# Patient Record
Sex: FEMALE | Race: ASIAN | NOT HISPANIC OR LATINO | Employment: UNEMPLOYED | URBAN - METROPOLITAN AREA
[De-identification: names, ages, dates, MRNs, and addresses within clinical notes are randomized per-mention and may not be internally consistent; named-entity substitution may affect disease eponyms.]

---

## 2022-08-31 ENCOUNTER — OFFICE VISIT (OUTPATIENT)
Dept: FAMILY MEDICINE | Facility: CLINIC | Age: 22
End: 2022-08-31
Payer: COMMERCIAL

## 2022-08-31 VITALS
SYSTOLIC BLOOD PRESSURE: 108 MMHG | RESPIRATION RATE: 16 BRPM | HEART RATE: 80 BPM | BODY MASS INDEX: 20.56 KG/M2 | OXYGEN SATURATION: 100 % | DIASTOLIC BLOOD PRESSURE: 72 MMHG | HEIGHT: 59 IN | TEMPERATURE: 98.8 F | WEIGHT: 102 LBS

## 2022-08-31 DIAGNOSIS — Z32.01 PREGNANCY TEST POSITIVE: Primary | ICD-10-CM

## 2022-08-31 PROCEDURE — 99203 OFFICE O/P NEW LOW 30 MIN: CPT | Mod: GC | Performed by: STUDENT IN AN ORGANIZED HEALTH CARE EDUCATION/TRAINING PROGRAM

## 2022-08-31 RX ORDER — PNV NO.95/FERROUS FUM/FOLIC AC 28MG-0.8MG
1 TABLET ORAL DAILY
Qty: 90 TABLET | Refills: 3 | Status: SHIPPED | OUTPATIENT
Start: 2022-08-31 | End: 2023-09-21

## 2022-08-31 RX ORDER — PYRIDOXINE HCL (VITAMIN B6) 25 MG
25 TABLET ORAL DAILY
Qty: 90 TABLET | Refills: 0 | Status: SHIPPED | OUTPATIENT
Start: 2022-08-31 | End: 2023-02-08

## 2022-08-31 NOTE — PROGRESS NOTES
"  Assessment & Plan     1. Pregnancy test positive  Patient will return tomorrow for 9 am visit with Dr. Grfifin.  Because she misunderstood her to do a urine pregnancy test, I reviewed this with her today.  She will come earlier tomorrow morning to do the urine pregnancy test before her appointment with Dr. Griffin.  - Prenatal Vit-Fe Fumarate-FA (PRENATAL VITAMINS) 28-0.8 MG TABS; Take 1 tablet by mouth daily  Dispense: 90 tablet; Refill: 3  - pyridOXINE (VITAMIN B6) 25 MG tablet; Take 1 tablet (25 mg) by mouth daily  Dispense: 90 tablet; Refill: 0  - US OB <14 WKS SINGLE OR FIRST GESTATION        Staffed with Dr. Gillis.      Sol Jorge MD  PGY3  [facility] VANESSA Resendez is a 21 year old accompanied by her spouse and  (phone) presenting for the following health issues:  Pregnancy Test (Home preg test positive on 7/23/22), New Patient, and Medication Reconciliation (Reviewed)      HPI      Positive pregnancy test at home.  Unfortunately, she went to the bathroom but not in the urine cup. She had nausea, vomiting.  She also feels very tired. LMP 5/24.    Review of Systems   Constitutional, HEENT, cardiovascular, pulmonary, gi and gu systems are negative, except as otherwise noted.      Objective    /72 (BP Location: Left arm, Patient Position: Sitting, Cuff Size: Adult Regular)   Pulse 80   Temp 98.8  F (37.1  C) (Oral)   Resp 16   Ht 1.495 m (4' 10.86\")   Wt 46.3 kg (102 lb)   LMP 05/24/2022 (Within Days)   SpO2 100%   BMI 20.70 kg/m    Body mass index is 20.7 kg/m .  Physical Exam   GENERAL: healthy, alert and no distress  NECK: no adenopathy, no asymmetry, masses, or scars and thyroid normal to palpation  RESP: lungs clear to auscultation - no rales, rhonchi or wheezes  CV: regular rate and rhythm, normal S1 S2, no S3 or S4, no murmur, click or rub, no peripheral edema and peripheral pulses strong  ABDOMEN: soft, nontender, no hepatosplenomegaly, no " masses and bowel sounds normal  MS: no gross musculoskeletal defects noted, no edema    No results found for this or any previous visit (from the past 24 hour(s)).    ----- Service Performed and Documented by Resident or Fellow ------            .  ..

## 2022-08-31 NOTE — PATIENT INSTRUCTIONS
Thank you for coming to [facility] for your care.  It was a pleasure to take care of you!    Here is what we discussed:  Congratulations on your pregnancy!  Start taking prenatal vitamin every day  Take B6 when you feel nauseous  Return to clinic tomorrow at 9 am for your appointment with Dr. Griffin. You will need to do a pregnancy test before that.     Please call our clinic ([phone]) or send a message via [email or web address] with any questions or concerns!    Dr. Sol Jorge

## 2022-08-31 NOTE — NURSING NOTE
Due to patient being non-English speaking/uses sign language, an  was used for this visit. Only for face-to-face interpretation by an external agency, date and length of interpretation can be found on the scanned worksheet.     name: [id number]  Agency: [employer]  Language: Nahed   Telephone number: [phone]  Type of interpretation: Telephone, spoken

## 2022-08-31 NOTE — PROGRESS NOTES
Preceptor Attestation:  I discussed the patient with the resident and evaluated the patient in person. I have verified the content of the note, which accurately reflects my assessment of the patient and the plan of care.  Supervising Physician:  Bekah Gillis MD.

## 2022-08-31 NOTE — Clinical Note
GILES this patient was seen today but misunderstood instructions on doing a urine pregnancy test.  LMP end of May.  I reviewed with her that she will need to come early to do a UPT before seeing Dr. Griffin at 9.  I would love to see her after her first OB visit for ongoing prenatal care. Thank you, AD

## 2022-09-01 ENCOUNTER — OFFICE VISIT (OUTPATIENT)
Dept: FAMILY MEDICINE | Facility: CLINIC | Age: 22
End: 2022-09-01
Payer: COMMERCIAL

## 2022-09-01 DIAGNOSIS — N89.8 VAGINAL DISCHARGE: ICD-10-CM

## 2022-09-01 DIAGNOSIS — N92.6 MISSED MENSES: Primary | ICD-10-CM

## 2022-09-01 LAB
CLUE CELLS: ABNORMAL
HCG UR QL: POSITIVE
TRICHOMONAS, WET PREP: ABNORMAL
WBC'S/HIGH POWER FIELD, WET PREP: ABNORMAL
YEAST, WET PREP: ABNORMAL

## 2022-09-01 PROCEDURE — 99213 OFFICE O/P EST LOW 20 MIN: CPT | Performed by: STUDENT IN AN ORGANIZED HEALTH CARE EDUCATION/TRAINING PROGRAM

## 2022-09-01 PROCEDURE — 76815 OB US LIMITED FETUS(S): CPT | Performed by: STUDENT IN AN ORGANIZED HEALTH CARE EDUCATION/TRAINING PROGRAM

## 2022-09-01 PROCEDURE — 87210 SMEAR WET MOUNT SALINE/INK: CPT | Performed by: STUDENT IN AN ORGANIZED HEALTH CARE EDUCATION/TRAINING PROGRAM

## 2022-09-01 PROCEDURE — 81025 URINE PREGNANCY TEST: CPT | Performed by: STUDENT IN AN ORGANIZED HEALTH CARE EDUCATION/TRAINING PROGRAM

## 2022-09-01 SDOH — ECONOMIC STABILITY: TRANSPORTATION INSECURITY
IN THE PAST 12 MONTHS, HAS LACK OF TRANSPORTATION KEPT YOU FROM MEETINGS, WORK, OR FROM GETTING THINGS NEEDED FOR DAILY LIVING?: NO

## 2022-09-01 SDOH — ECONOMIC STABILITY: TRANSPORTATION INSECURITY
IN THE PAST 12 MONTHS, HAS THE LACK OF TRANSPORTATION KEPT YOU FROM MEDICAL APPOINTMENTS OR FROM GETTING MEDICATIONS?: NO

## 2022-09-01 SDOH — ECONOMIC STABILITY: FOOD INSECURITY: WITHIN THE PAST 12 MONTHS, THE FOOD YOU BOUGHT JUST DIDN'T LAST AND YOU DIDN'T HAVE MONEY TO GET MORE.: NEVER TRUE

## 2022-09-01 SDOH — ECONOMIC STABILITY: FOOD INSECURITY: WITHIN THE PAST 12 MONTHS, YOU WORRIED THAT YOUR FOOD WOULD RUN OUT BEFORE YOU GOT MONEY TO BUY MORE.: NEVER TRUE

## 2022-09-01 ASSESSMENT — SOCIAL DETERMINANTS OF HEALTH (SDOH): HOW HARD IS IT FOR YOU TO PAY FOR THE VERY BASICS LIKE FOOD, HOUSING, MEDICAL CARE, AND HEATING?: NOT HARD AT ALL

## 2022-09-01 NOTE — NURSING NOTE
Due to patient being non-English speaking/uses sign language, an  was used for this visit. Only for face-to-face interpretation by an external agency, date and length of interpretation can be found on the scanned worksheet.      name: Leena Karimi  Agency: [employer]  Language: Victoria  .  Type of interpretation: Face-to-face, spoken

## 2022-09-01 NOTE — PROGRESS NOTES
Pregnancy Ultrasound for Dating and Initial Medical Assessment    21 year old,, at 14w2d by LMP 22, planned pregnancy.  No vaginal bleeding; has malodorous creamy white discharge.  No pain  Minimal nausea/vomiting  Good PO tolerance    OB History    Para Term  AB Living   1 0 0 0 0 0   SAB IAB Ectopic Multiple Live Births   0 0 0 0 0      # Outcome Date GA Lbr Julio C/2nd Weight Sex Delivery Anes PTL Lv   1 Current                Past Medical History:   Diagnosis Date     Known health problems: none        Past Surgical History:   Procedure Laterality Date     NO HISTORY OF SURGERY         No Known Allergies    Current Outpatient Medications   Medication     Prenatal Vit-Fe Fumarate-FA (PRENATAL VITAMINS) 28-0.8 MG TABS     pyridOXINE (VITAMIN B6) 25 MG tablet     No current facility-administered medications for this visit.       LMP 2022 (Within Days)     Labs: not yet done    Ultrasound Findings: See imaging  Transabdominal images obtained    + Intrauterine gestational sac  + Yolk sac   + Fetal pole  + Cardiac motion  + Gross movement    CRL =6.46 cm = 12w6d  CRL =6.25 cm = 12w5d  CRL =5.94 cm = 12w3d    Average CRL = 6.21cm = 12w5d      Impression: Single living IUP, redated at 12w5d by today's early dating ultrasound, with ROQUE 3/11/2023.    C/W LMP? No  Redate? Yes    Recommendations for Prenatal Care: Routine prenatal care    Aspirin prophylaxis starting at 12-16 wk: No  Any one of the following high risk factors:  1. Pregestational DM1 or DM2  2. Chronic HTN  3. Autoimmune dz (Eg SLE, APLS)   4. H/o Preeclampsia in prior pregnancy  5. Multifetal gestation  6.   Renal Disease     Consider for two or more  of the following moderate risk factors:  1. Nulliparity or > 10 years since last birth  2. Obesity (BMI > 30)  3. H/o preeclampsia in mother or sister  4. Age ? 35 years  5. Socio-demographic characteristics (AA race)  6. Personal Hx of factors (prior SGA, prior adverse outcome:  stillborn)      Perinatology Consult: No    Dipti was seen today for prenatal care.    Diagnoses and all orders for this visit:    Missed menses  -     HCG qualitative urine; Future  -     HCG qualitative urine  -     US OB Limited >14 Weeks wo Fetal Measurement    Vaginal discharge  -     Wet preparation            Follow Up:  Future Appointments   Date Time Provider Department Center   9/1/2022  9:00 AM Niharika Griffin MD [facility]       Niharika Griffin MD

## 2022-09-01 NOTE — NURSING NOTE
Average Risk Category  No significant risk factors: Yes    At Risk Category (up to 3)  Teen pregnancy: No  Poor social situation: No  Domestic abuse: No  Financial difficulties: No  Smoker: No  H/O  deliver: No  H/O drug abuse: No  Non-English speaking: Yes  Advanced maternal age: No  GDM risks: No  Previous C/S: No  H/O PIH: No  H/O STIs: No  H/O mental health concerns: No  Onset care > 20 weeks: No  Other: First pregnancy    High Risk Category (4 or more At Risk or)  Diabetes/GDM: No  Multiple gestation: No  Chronic hypertension: No  Significant hx of asthma: No  Fetal demise > 20 weeks: No  Positive tox screen: No  Current mental health treatment: No      Risk: At Risk   Date determined: 22

## 2022-09-07 ENCOUNTER — ALLIED HEALTH/NURSE VISIT (OUTPATIENT)
Dept: FAMILY MEDICINE | Facility: CLINIC | Age: 22
End: 2022-09-07
Payer: COMMERCIAL

## 2022-09-07 ENCOUNTER — OFFICE VISIT (OUTPATIENT)
Dept: FAMILY MEDICINE | Facility: CLINIC | Age: 22
End: 2022-09-07

## 2022-09-07 VITALS
WEIGHT: 102 LBS | SYSTOLIC BLOOD PRESSURE: 110 MMHG | BODY MASS INDEX: 20.56 KG/M2 | OXYGEN SATURATION: 100 % | RESPIRATION RATE: 16 BRPM | HEART RATE: 69 BPM | TEMPERATURE: 99 F | DIASTOLIC BLOOD PRESSURE: 73 MMHG | HEIGHT: 59 IN

## 2022-09-07 DIAGNOSIS — Z34.01 ENCOUNTER FOR SUPERVISION OF NORMAL FIRST PREGNANCY IN FIRST TRIMESTER: Primary | ICD-10-CM

## 2022-09-07 DIAGNOSIS — L70.0 ACNE VULGARIS: ICD-10-CM

## 2022-09-07 DIAGNOSIS — Z01.84 LACK OF IMMUNITY TO HEPATITIS B VIRUS DEMONSTRATED BY SEROLOGIC TEST: ICD-10-CM

## 2022-09-07 DIAGNOSIS — Z34.00 PRIMIGRAVIDA, ANTEPARTUM: ICD-10-CM

## 2022-09-07 DIAGNOSIS — Z60.3 LANGUAGE BARRIER, CULTURAL DIFFERENCES: ICD-10-CM

## 2022-09-07 DIAGNOSIS — B00.1 RECURRENT COLD SORES: ICD-10-CM

## 2022-09-07 DIAGNOSIS — O09.90 SUPERVISION OF HIGH RISK PREGNANCY, ANTEPARTUM: ICD-10-CM

## 2022-09-07 LAB
ABO/RH(D): NORMAL
ANTIBODY SCREEN: NEGATIVE
ERYTHROCYTE [DISTWIDTH] IN BLOOD BY AUTOMATED COUNT: 13.3 % (ref 10–15)
HBA1C MFR BLD: 5.1 % (ref 0–5.6)
HBV SURFACE AB SERPL IA-ACNC: 0 M[IU]/ML
HBV SURFACE AB SERPL IA-ACNC: NONREACTIVE M[IU]/ML
HBV SURFACE AG SERPL QL IA: NONREACTIVE
HCT VFR BLD AUTO: 34.5 % (ref 35–47)
HGB BLD-MCNC: 12 G/DL (ref 11.7–15.7)
HIV 1+2 AB+HIV1 P24 AG SERPL QL IA: NONREACTIVE
MCH RBC QN AUTO: 30.3 PG (ref 26.5–33)
MCHC RBC AUTO-ENTMCNC: 34.8 G/DL (ref 31.5–36.5)
MCV RBC AUTO: 87 FL (ref 78–100)
PLATELET # BLD AUTO: 205 10E3/UL (ref 150–450)
RBC # BLD AUTO: 3.96 10E6/UL (ref 3.8–5.2)
RUBV IGG SERPL QL IA: 8.97 INDEX
RUBV IGG SERPL QL IA: POSITIVE
SPECIMEN EXPIRATION DATE: NORMAL
SPECIMEN EXPIRATION DATE: NORMAL
WBC # BLD AUTO: 10.2 10E3/UL (ref 4–11)

## 2022-09-07 PROCEDURE — 86762 RUBELLA ANTIBODY: CPT | Performed by: STUDENT IN AN ORGANIZED HEALTH CARE EDUCATION/TRAINING PROGRAM

## 2022-09-07 PROCEDURE — 86706 HEP B SURFACE ANTIBODY: CPT | Performed by: STUDENT IN AN ORGANIZED HEALTH CARE EDUCATION/TRAINING PROGRAM

## 2022-09-07 PROCEDURE — 86900 BLOOD TYPING SEROLOGIC ABO: CPT | Performed by: STUDENT IN AN ORGANIZED HEALTH CARE EDUCATION/TRAINING PROGRAM

## 2022-09-07 PROCEDURE — 87340 HEPATITIS B SURFACE AG IA: CPT | Performed by: STUDENT IN AN ORGANIZED HEALTH CARE EDUCATION/TRAINING PROGRAM

## 2022-09-07 PROCEDURE — 83036 HEMOGLOBIN GLYCOSYLATED A1C: CPT | Performed by: STUDENT IN AN ORGANIZED HEALTH CARE EDUCATION/TRAINING PROGRAM

## 2022-09-07 PROCEDURE — 99207 PR FIRST OB VISIT: CPT | Mod: GC | Performed by: STUDENT IN AN ORGANIZED HEALTH CARE EDUCATION/TRAINING PROGRAM

## 2022-09-07 PROCEDURE — 86780 TREPONEMA PALLIDUM: CPT | Performed by: STUDENT IN AN ORGANIZED HEALTH CARE EDUCATION/TRAINING PROGRAM

## 2022-09-07 PROCEDURE — 86901 BLOOD TYPING SEROLOGIC RH(D): CPT | Performed by: STUDENT IN AN ORGANIZED HEALTH CARE EDUCATION/TRAINING PROGRAM

## 2022-09-07 PROCEDURE — 87086 URINE CULTURE/COLONY COUNT: CPT | Performed by: STUDENT IN AN ORGANIZED HEALTH CARE EDUCATION/TRAINING PROGRAM

## 2022-09-07 PROCEDURE — 36415 COLL VENOUS BLD VENIPUNCTURE: CPT | Performed by: STUDENT IN AN ORGANIZED HEALTH CARE EDUCATION/TRAINING PROGRAM

## 2022-09-07 PROCEDURE — 85027 COMPLETE CBC AUTOMATED: CPT | Performed by: STUDENT IN AN ORGANIZED HEALTH CARE EDUCATION/TRAINING PROGRAM

## 2022-09-07 PROCEDURE — 87389 HIV-1 AG W/HIV-1&-2 AB AG IA: CPT | Performed by: STUDENT IN AN ORGANIZED HEALTH CARE EDUCATION/TRAINING PROGRAM

## 2022-09-07 PROCEDURE — 86787 VARICELLA-ZOSTER ANTIBODY: CPT | Performed by: STUDENT IN AN ORGANIZED HEALTH CARE EDUCATION/TRAINING PROGRAM

## 2022-09-07 PROCEDURE — 99207 PR NO BILLABLE SERVICE THIS VISIT: CPT

## 2022-09-07 PROCEDURE — 86850 RBC ANTIBODY SCREEN: CPT | Performed by: STUDENT IN AN ORGANIZED HEALTH CARE EDUCATION/TRAINING PROGRAM

## 2022-09-07 RX ORDER — BENZOYL PEROXIDE 10 G/100G
SUSPENSION TOPICAL AT BEDTIME
Qty: 227 G | Refills: 3 | Status: SHIPPED | OUTPATIENT
Start: 2022-09-07 | End: 2023-02-08

## 2022-09-07 RX ORDER — VALACYCLOVIR HYDROCHLORIDE 1 G/1
2000 TABLET, FILM COATED ORAL 2 TIMES DAILY
Qty: 4 TABLET | Refills: 0 | Status: ON HOLD | OUTPATIENT
Start: 2022-09-07 | End: 2023-03-10

## 2022-09-07 SDOH — SOCIAL STABILITY - SOCIAL INSECURITY: ACCULTURATION DIFFICULTY: Z60.3

## 2022-09-07 NOTE — NURSING NOTE
[phone]  Due to patient being non-English speaking/uses sign language, an  was used for this visit. Only for face-to-face interpretation by an external agency, date and length of interpretation can be found on the scanned worksheet.     name: Yumiko Lamar  Agency: [employer]  Language: Nahed   Telephone number: [phone]  Type of interpretation: Face-to-face, spoken

## 2022-09-07 NOTE — PROGRESS NOTES
Past Medical History     Do you have a history of any of the following medical conditions?    Condition No/Yes/Details   Hypertension No    Heart disease, mitral valve prolapse, rheumatic fever No    Asthma or another chronic lung disease No    An autoimmune disorder No    Kidney disease No    Frequent urinary tract infections No    Epilepsy, seizures, or spells No    Migraine headaches No    Stroke, loss of sensation/function, seizures, or other neuro problem No    Diabetes No    Thyroid problems or have you taken thyroid medication No    Hepatitis, liver disease, jaundice No    Blood clots, phlebitis, pulmonary embolism or varicose veins No    Excessive bleeding after surgery or dental work No    Do you have more bleeding than other women after a cut or a scratch? No    Anemia No    Blood transfusions No         Would you refuse a blood transfusion?       No   If yes, then ask next question. If no, skip next question.   Would you rather die than receive a blood transfusion? No    Breast problems No    Have you ever ? No plans to bottle feed   Abnormalities of the uterus No    Abnormal pap smear No    Have you ever been treated for depression? No    Are you having problems with crying spells or loss of self-esteem? No    Have you ever required psychiatric care? No    Have you been physically, sexually, or emotionally hurt by someone? No    Have you been in a major accident or suffered serious trauma? No    Have you ever had any gynecological surgical procedures such as cervical conization, LEEP, laser treatment, cryosurgery of the cervix, or a dilatation and curettage? No    Have you had any other surgical procedures? No         Have you ever had any complications from anesthesia? No    Have you ever been hospitalized for a nonsurgical reason? No             Substance use and exposure     Does anyone in your home smoke? No    Do you use tobacco products or betel nut? No    Do you drink beer, wine,  or hard liquor? No    Do you use any of the following: marijuana, speed, cocaine, heroin, hallucinogens, or other drugs? No               Symptoms since Last Menstrual Period     Do you currently have any of the following symptoms: No/Yes/Details        Abdominal pain No         Blood in the stools or urine No         Chest pain No         Shortness of breath No         coughing or vomiting up blood No         Your heart racing or skipping beats No         Nausea or vomiting  YES getting better        Pain on urination No         Vaginal discharge or bleeding  YES white vaginal discharge               Genetic Screening          Is the patient 35 years or older? No      Do you have a history of any of the following No/Yes/Details        A metabolic disorder (e.g. Insulin-dependent DM, PKU) No         Recurrent pregnancy loss or still birth No      Do you, the baby's father, or anyone in your families have          Thalassemia AND MCV <80 No         Hemophilia No         Neural tube defect No }        Congenital heart defect No         Sickle cell disease or trait No         Muscular dystrophy No         Cystic fibrosis No         Mental retardation or autism No         Down's syndrome No         Ric-Sach's disease No         Юлия's chorea No         Any other inherited genetic or chromosomal disorder No         A child with birth defects not listed above No                Infection History     Ever treated for tuberculosis or had a positive skin test No    Ever had genital herpes (or has your partner) No    Had a rash or viral illness since LMP No    Ever had a sexually transmitted infection No    Ever had chicken pox or the vaccine No    Have you had a sexual partner who is HIV positive No    Ever had any other serious infectious disease No                Risk Assessment     Average Risk Category  No significant risk factors: Yes    At Risk Category (up to 3)  Teen pregnancy: No  Poor social situation:  No  Domestic abuse: No  Financial difficulties: No  Smoker: No  H/O  deliver: No  H/O drug abuse: No  Non-English speaking: Yes  Advanced maternal age: No  GDM risks: No  Previous C/S: No  H/O PIH: No  H/O STIs: No  H/O mental health concerns: No  Onset care > 20 weeks: No      High Risk Category (4 or more At Risk or)  Diabetes/GDM: No  Multiple gestation: No  Chronic hypertension: No  Significant hx of asthma: No  Fetal demise > 20 weeks: No  Positive tox screen: No  Current mental health treatment: No      Risk: At Risk   Date determined: 22

## 2022-09-07 NOTE — PATIENT INSTRUCTIONS
Thank you for coming to [facility] for your care.  It was a pleasure to take care of you!    Here is what we discussed:  Congratulations on your pregnancy!  Use the face was in the evening  Use sunscreen  Take valcyclovir 1000 mg 2 times daily for 1 day      Please call our clinic ([phone]) or send a message via [employer] with any questions or concerns!    Dr. Sol Jorge

## 2022-09-07 NOTE — PROGRESS NOTES
"Preceptor Attestation:  Vitals:    09/07/22 0810   BP: 110/73   BP Location: Right arm   Patient Position: Sitting   Cuff Size: Adult Regular   Pulse: 69   Resp: 16   Temp: 99  F (37.2  C)   TempSrc: Oral   SpO2: 100%   Weight: 46.3 kg (102 lb)   Height: 1.495 m (4' 10.86\")          I discussed the patient with the resident and evaluated the patient in person. I have verified the content of the note, which accurately reflects my assessment of the patient and the plan of care.   Supervising Physician:  Niharika Griffin MD    "

## 2022-09-08 LAB — T PALLIDUM AB SER QL: NONREACTIVE

## 2022-09-08 NOTE — PROGRESS NOTES
First Obstetric Visit       AFSHIN Kirkpatrick is a 21 year old woman who presents for an initial prenatal visit at 14w2d pregnant with ROQUE of  Mar 11, 2023 by by Patient's last menstrual period was 2022 (within days)..  She has not had bleeding since her LMP. She has had mild nausea. Weight loss has not occurred.  This was a planned pregnancy.     OTHER CONCERNS: Intermittent rectal pain, acne     Labor Risk Assessment   Is the patient's age <18 or >40?    No  Patint's BMI is Body mass index is 20.7 kg/m . Does patient have a BMI < 18.5?    No  If previous pregnancy, was delivery within previous 6 months?    No  Have you ever delivered a baby prior to 37 weeks gestation?    No  Did conception for this pregnancy occur via In Vitro Fertilization?    No  Are you carrying twins?    No    Summary:  Average risk pregnancy  Past Medical History  Past Medical History:   Diagnosis Date     Known health problems: none      Do you have a history of any of the following medical conditions?    Condition Yes/No   Hypertension No   Heart disease, mitral valve prolapse, rheumatic fever No   Asthma or another chronic lung disease No   An autoimmune disorder No   Kidney disease No   Frequent urinary tract infections No   Migraine headaches No   Stroke, loss of sensation/function, seizures, or other neuro problem No   Diabetes No   Thyroid problems or have you taken thyroid medication No   Hepatitis, liver disease, jaundice No   Blood clots, phlebitis, pulmonary embolism or varicose veins No   Excessive bleeding after surgery or dental work No   Heavy menstrual periods requiring treatment No   Anemia No   Blood transfusions No        Would you refuse a blood transfusion? No   Breast problems No   Abnormalities of the uterus No   Abnormal pap smear No   Have you been treated for an emotional disturbance? No   Have you been physically, sexually, or emotionally hurt by someone? No   Have you been in a major accident or  suffered serious trauma? No   Have you had surgical procedures? No        Have you ever had any complications from anesthesia? No   Have you ever been hospitalized for a nonsurgical reason? No     Prenatal Genetic Screening -- See today's RN note.    Do you have a history of any of the following Yes/No        A metabolic disorder (e.g. Insulin-dependent DM, PKU) No        Recurrent pregnancy loss No     Do you, the baby's father, or anyone in your families have Yes/No        Thalassemia AND MCV <80 No        Hemophilia No        Neural tube defect No        Congenital heart defect No        Sickle cell disease or trait No        Muscular dystrophy No        Cystic fibrosis No        Mental retardation or autism No        Down's syndrome No        Ric-Sach's disease No        Юлия's chorea No        Any other inherited genetic or chromosomal disorder No        A child with birth defects not listed above No     Infection History    At high risk for coming in contact with HIV No   Ever treated for tuberculosis No   Ever received the BCG vaccine for tuberculosis No   Ever had genital herpes (or has your partner) No   Had a rash or viral illness since LMP No   Ever had a sexually transmitted infection No   Ever had chicken pox or the vaccine Yes   Ever had any other serious infectious disease No   Up to date with immunizations Yes       Habits    No lumps and no nipple discharge    Current medications are:  Current Outpatient Medications   Medication Sig Dispense Refill     benzoyl peroxide (PANOXYL) 10 % external liquid Apply topically At Bedtime 227 g 3     Prenatal Vit-Fe Fumarate-FA (PRENATAL VITAMINS) 28-0.8 MG TABS Take 1 tablet by mouth daily 90 tablet 3     valACYclovir (VALTREX) 1000 mg tablet Take 2 tablets (2,000 mg) by mouth 2 times daily for 1 day 4 tablet 0     pyridOXINE (VITAMIN B6) 25 MG tablet Take 1 tablet (25 mg) by mouth daily (Patient not taking: Reported on 9/7/2022) 90 tablet 0       REVIEW  "OF SYSTEMS  ENT: NEGATIVE for ear, mouth and throat problems  CV: NEGATIVE for chest pain, palpitations or peripheral edema  GI: NEGATIVE for nausea, abdominal pain, heartburn, or change in bowel habits  : NEGATIVE for unusual urinary or vaginal symptoms. Periods are regular.  C: NEGATIVE for fever, chills, change in weight  I: NEGATIVE for worrisome rashes, moles or lesions  E: NEGATIVE for vision changes or irritation  R: NEGATIVE for significant cough or SOB  B: NEGATIVE for masses, tenderness or discharge  M: NEGATIVE for significant arthralgias or myalgia  N: NEGATIVE for weakness, dizziness or paresthesias  P: NEGATIVE for changes in mood or affect  ====================================================    PHYSICAL EXAM:  /73 (BP Location: Right arm, Patient Position: Sitting, Cuff Size: Adult Regular)   Pulse 69   Temp 99  F (37.2  C) (Oral)   Resp 16   Ht 1.495 m (4' 10.86\")   Wt 46.3 kg (102 lb)   LMP 05/24/2022 (Within Days)   SpO2 100%   BMI 20.70 kg/m         GENERAL:  Pleasant pregnant female, alert, well groomed.  SKIN:  Warm and dry, without lesions or rashes  HEAD: Symmetrical features.  EYES:  PERRL, EOMI.  MOUTH:  Buccal mucosa pink, moist without lesions.    NECK:  Thyroid without enlargement and nodules.  Lymph nodes not palpable.  LUNGS:  Clear to auscultation.  BREAST:  Deferred - Next visit   HEART:  RRR without murmur.  ABDOMEN: Soft without masses, tenderness or organomegaly.  No CVA tenderness..   MUSCULOSKELETAL:  Full range of motion  SKIN:  Comedones on face and cheeks  EXTREMITIES:  No edema. No significant varicosities.   :  Deferred - next visit    =========================================    ASSESSMENT/PLAN  1. Encounter for supervision of normal first pregnancy in first trimester  - Varicella Zoster Virus Antibody IgG; Future  - Hepatitis B Surface Antibody; Future  - Hemoglobin A1c; Future  - ABO/Rh Type-HML; Future  - Antibody Screen; Future  - CBC with Plt; " Future  - Culture Urine; Future  - Hepatitis B Surface Ag; Future  - HIV Ag/Ab Screen Cascade; Future  - Rubella Antibody IgG; Future  - Syphilis Screen Cascade; Future  - Varicella Zoster Virus Antibody IgG  - Hepatitis B Surface Antibody  - Hemoglobin A1c  - ABO/Rh Type-HML  - Antibody Screen  - CBC with Plt  - Culture Urine  - Hepatitis B Surface Ag  - HIV Ag/Ab Screen Cascade  - Rubella Antibody IgG  - Syphilis Screen Cascade    2. Recurrent cold sores  - valACYclovir (VALTREX) 1000 mg tablet; Take 2 tablets (2,000 mg) by mouth 2 times daily for 1 day  Dispense: 4 tablet; Refill: 0    3. Acne vulgaris  - benzoyl peroxide (PANOXYL) 10 % external liquid; Apply topically At Bedtime  Dispense: 227 g; Refill: 3    Average risk pregnancy  Recommended weight gain: 25-35 lbs.  Instructed on best evidence for: weight gain for her BMI for pregnancy; healthy diet and foods to avoid; exercise and activity during pregnancy;  avoiding exposure to toxoplasmosis; and maintenance of a generally healthy lifestyle.   Discussed the harms, benefits, side effects and alternative therapies for current prescribed and OTC medications.    Options for treatment and follow-up care were reviewed with the patient and/or guardian. Dipti Kirkpatrick and/or guardian engaged in the decision making process and verbalized understanding of the options discussed and agreed with the final plan.    Staffed with Dr. Griffin.    Sol Jorge MD

## 2022-09-09 PROBLEM — Z60.3 LANGUAGE BARRIER, CULTURAL DIFFERENCES: Status: ACTIVE | Noted: 2022-09-07

## 2022-09-09 PROBLEM — Z01.84 LACK OF IMMUNITY TO HEPATITIS B VIRUS DEMONSTRATED BY SEROLOGIC TEST: Status: ACTIVE | Noted: 2022-09-07

## 2022-09-09 PROBLEM — Z34.00 FIRST PREGNANCY: Status: ACTIVE | Noted: 2022-09-07

## 2022-09-09 PROBLEM — O09.90 SUPERVISION OF HIGH RISK PREGNANCY, ANTEPARTUM: Status: ACTIVE | Noted: 2022-09-09

## 2022-09-09 LAB
BACTERIA UR CULT: NORMAL
VZV IGG SER QL IA: 680.8 INDEX
VZV IGG SER QL IA: POSITIVE

## 2022-09-09 NOTE — NURSING NOTE
Family Medicine OB Education    I provided the following OB education to Dipti Kirkpatrick.    Discussed that Dr Jorge should be the doctor that she sees for her prenatal visits and that provider will try hard to be the one to deliver her baby.  Discussed that if primary provider was unavailable that one of our other physicians would deliver the baby and that this could be a male or female provider.  Briefly discussed residency program and that multiple doctors would be present at time of delivery.  Sheet given and discussed fetal growth and development.  Sheet given and discussed warning signs with reasons to call clinic or L&D with questions or concerns (phone numbers given).  Sheet given and discussed Tdap to be given after 27 weeks gestation and the importance of family members get immunized before delivery.  Proof of pregnancy completed and given to pt.  Gave pt preregistration form for hospital to complete.  See questionnaire and pregnancy risk assessment for further information in provider encounter.     Legal Screener completed and there were no concerns for government benefits, housing, or immigration.      Name of provider who requested the OB education: Dr Jorge  Name of provider on site (faculty or community preceptor) at the time of performing the OB education: Dr Elias Singh, RN, BSN

## 2022-09-21 ENCOUNTER — OFFICE VISIT (OUTPATIENT)
Dept: FAMILY MEDICINE | Facility: CLINIC | Age: 22
End: 2022-09-21
Payer: COMMERCIAL

## 2022-09-21 VITALS
OXYGEN SATURATION: 100 % | RESPIRATION RATE: 16 BRPM | DIASTOLIC BLOOD PRESSURE: 78 MMHG | SYSTOLIC BLOOD PRESSURE: 110 MMHG | HEART RATE: 83 BPM | TEMPERATURE: 98.7 F

## 2022-09-21 DIAGNOSIS — L50.9 URTICARIA: Primary | ICD-10-CM

## 2022-09-21 PROCEDURE — 99213 OFFICE O/P EST LOW 20 MIN: CPT | Mod: GC

## 2022-09-21 RX ORDER — TETRAHYDROZOLINE HCL 0.05 %
1 DROPS OPHTHALMIC (EYE) 3 TIMES DAILY
Qty: 30 ML | Refills: 0 | Status: SHIPPED | OUTPATIENT
Start: 2022-09-21 | End: 2023-02-08

## 2022-09-21 RX ORDER — DIAPER,BRIEF,INFANT-TODD,DISP
EACH MISCELLANEOUS 2 TIMES DAILY
Qty: 30 G | Refills: 1 | Status: SHIPPED | OUTPATIENT
Start: 2022-09-21 | End: 2023-02-08

## 2022-09-21 NOTE — PROGRESS NOTES
"  Assessment & Plan     Urticaria  Patient had an episode of rash and itching of the face following eating a particular brand of noodles she previously had no reaction to.  Unclear the exact irritant in the noodles, but advised patient to avoid this particular brand in the meanwhile.  May be exacerbated by pregnancy.  Patient's main symptoms consist of itching, rash, and burning sensation of the eyes.  Patient has a history of \"chronic cold sores\" that may be impacting the rash around the mouth.  -Treat current urticaria, then follow-up about chronic cold sores  - tetrahydrozoline (VISINE) 0.05 % ophthalmic solution; Place 1 drop into both eyes 3 times daily  - hydrocortisone (CORTAID) 1 % external ointment; Apply topically 2 times daily Apply to face twice a day. Use moisturizer to avoid skin drying. Do not let get in eyes.  -She may obtain these over-the-counter if not covered by insurance  -Follow-up for discussion if border of the mouth rash does not continue to improve  Diagnosis or treatment significantly limited by social determinants of health - non-English speaking  Prescription drug management  35 minutes spent on the date of the encounter doing chart review, history and exam, documentation and further activities per the note         Return if symptoms worsen or fail to improve, for or for follow up of cold sores.    Niharika Arroyo MD PGY-2  [facility] VANESSA Resendez is a 21 year old accompanied by her , presenting for the following health issues:  Allergic Reaction (Pt thinks she had an allergic reaction to some noodles she ate.  She denies any internal reactions.  NO anaphylaxis.  She states the reaction started 3 days ago.  Pt states this is the second time she has had this particular brand of noodles but was unaware that they caused this reaction the first time.  She states the first time was in the same week. She had the noodles the first time on Saturday but " "the reaction started on Sunday.  Then she had them again on Monday and the rash started right away.)      HPI      Patient comes in for evaluation of possible allergic reaction secondary to noodle consumption.  The reaction was delayed from when she ate the noodles.  Noodles were consumed on Saturday, rash and pruritus of the face began on Sunday.  The rash began today down, and she had the same brand again on Monday, but the rash started right away this time.  He has had this brand of needles before on multiple occasions without incident.  She reports her main symptoms are the rash which is covering her whole face, and severe itching around the eyes and mouth.  She has been treating this at home with Benadryl, which has helped the itch a little bit, but does eventually wear off.  She reports feeling hot but had no temperature device to record herself at home.    She made an important note that the rash around her mouth is a little bit different than the rest of her body as she has \"chronic cold sores\" that she has been treating with Abreva.  These been bothering her for a long time and she is not sure if this is complicating the rash and its burning sensation around the sides of the mouth.    She denies any symptoms of difficulty breathing, throat swelling, tongue swelling, dysphagia, nausea, vomiting, abdominal cramping, lightheadedness, or dizziness.    He is currently 15 weeks 4 days pregnant.  She has no prior history of food or environmental allergies.        Review of Systems   Constitutional, HEENT, cardiovascular, pulmonary, gi and gu systems are negative, except as otherwise noted.    Reports eye burning sensation, facial rash, pruritis, and perioral burning sensation.     Patient denies dysphagia, difficulty breathing, oral swelling, tongue swelling, nausea, vomiting, abdominal cramping, or dizziness.      Objective    /78 (BP Location: Left arm, Patient Position: Sitting, Cuff Size: Adult Regular)  "  Pulse 83   Temp 98.7  F (37.1  C) (Oral)   Resp 16   LMP 05/24/2022 (Within Days)   SpO2 100%   There is no height or weight on file to calculate BMI.  Physical Exam   GENERAL: healthy, alert and no distress, maculopapular facial rash   EYES: Eyes grossly normal to inspection, PERRL and conjunctivae and sclerae normal  HENT: ear canals and pinna free of rash, nares clear with no internal swelling, oral mucosa moist and pink, no swelling or erythema of oropharynx   NECK: no rash noted on neck, posterior or anterior  RESP: lungs clear to auscultation - no rales, rhonchi or wheezes, good air movement throughout  CV: regular rate and rhythm, normal S1 S2, no S3 or S4, no murmur, click or rub, no peripheral edema and peripheral pulses strong  ABDOMEN: soft, nontender, no hepatosplenomegaly, enlarged uterus consistent with 15w pregnancy,  and bowel sounds normal  MS: no gross musculoskeletal defects noted, no edema  SKIN: Patient has pink maculopapular rash, rash is diffuse but slightly more congregated below the eyes, around the mouth with worsening crusting all along the border of the lips, rash spares the nasolabial folds    None ordered this visit    ----- Service Performed and Documented by Resident or Fellow ------

## 2022-09-21 NOTE — PATIENT INSTRUCTIONS
The two medications prescribed are hydrocortisone ointment and Visine eye drops. Both are available over the counter in weaker strength if insurance does not cover it.

## 2022-10-05 ENCOUNTER — OFFICE VISIT (OUTPATIENT)
Dept: FAMILY MEDICINE | Facility: CLINIC | Age: 22
End: 2022-10-05
Payer: COMMERCIAL

## 2022-10-05 VITALS
BODY MASS INDEX: 21.59 KG/M2 | TEMPERATURE: 98.7 F | WEIGHT: 106.4 LBS | OXYGEN SATURATION: 97 % | DIASTOLIC BLOOD PRESSURE: 67 MMHG | HEART RATE: 80 BPM | SYSTOLIC BLOOD PRESSURE: 97 MMHG | RESPIRATION RATE: 16 BRPM

## 2022-10-05 DIAGNOSIS — Z34.01 ENCOUNTER FOR SUPERVISION OF NORMAL FIRST PREGNANCY IN FIRST TRIMESTER: Primary | ICD-10-CM

## 2022-10-05 DIAGNOSIS — L70.0 ACNE VULGARIS: ICD-10-CM

## 2022-10-05 DIAGNOSIS — O09.90 SUPERVISION OF HIGH RISK PREGNANCY, ANTEPARTUM: Primary | ICD-10-CM

## 2022-10-05 LAB
CLUE CELLS: ABNORMAL
TRICHOMONAS, WET PREP: ABNORMAL
WBC'S/HIGH POWER FIELD, WET PREP: ABNORMAL
YEAST, WET PREP: ABNORMAL

## 2022-10-05 PROCEDURE — 90746 HEPB VACCINE 3 DOSE ADULT IM: CPT | Performed by: STUDENT IN AN ORGANIZED HEALTH CARE EDUCATION/TRAINING PROGRAM

## 2022-10-05 PROCEDURE — 90686 IIV4 VACC NO PRSV 0.5 ML IM: CPT | Performed by: STUDENT IN AN ORGANIZED HEALTH CARE EDUCATION/TRAINING PROGRAM

## 2022-10-05 PROCEDURE — 90472 IMMUNIZATION ADMIN EACH ADD: CPT | Performed by: STUDENT IN AN ORGANIZED HEALTH CARE EDUCATION/TRAINING PROGRAM

## 2022-10-05 PROCEDURE — 90471 IMMUNIZATION ADMIN: CPT | Performed by: STUDENT IN AN ORGANIZED HEALTH CARE EDUCATION/TRAINING PROGRAM

## 2022-10-05 PROCEDURE — 99207 PR PRENATAL VISIT: CPT | Mod: 25 | Performed by: STUDENT IN AN ORGANIZED HEALTH CARE EDUCATION/TRAINING PROGRAM

## 2022-10-05 PROCEDURE — 87210 SMEAR WET MOUNT SALINE/INK: CPT | Performed by: STUDENT IN AN ORGANIZED HEALTH CARE EDUCATION/TRAINING PROGRAM
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
## 2022-10-05 NOTE — NURSING NOTE
Due to patient being non-English speaking/uses sign language, an  was used for this visit. Only for face-to-face interpretation by an external agency, date and length of interpretation can be found on the scanned worksheet.     name:  Natasha Kirkpatrick  Agency: [employer]  Language: Nahed   Telephone number: [phone]  Type of interpretation: Face-to-face, spoken

## 2022-10-05 NOTE — PATIENT INSTRUCTIONS
Thank you for coming to [facility] for your care.  It was a pleasure to take care of you!    Here is what we discussed:  Diphenhydramine HCl 25 mg at bedtime (Benadryl)  Aquaphor, Vanicream, Cerave are good options for skin and body  Apply hydrocortisone after moisturizer  Get an ultrasound after about 20 weeks of pregnancy (3 weeks from now)     Please call our clinic ([phone]) or send a message via [employer] with any questions or concerns!    Dr. Sol Jorge

## 2022-10-10 NOTE — PROGRESS NOTES
Preceptor Attestation:    I discussed the patient with the resident and evaluated the patient in person. I have verified the content of the note, which accurately reflects my assessment of the patient and the plan of care.   Supervising Physician:  Jonn Judge MD.

## 2022-10-24 ENCOUNTER — ANCILLARY PROCEDURE (OUTPATIENT)
Dept: ULTRASOUND IMAGING | Facility: CLINIC | Age: 22
End: 2022-10-24
Attending: FAMILY MEDICINE
Payer: COMMERCIAL

## 2022-10-24 DIAGNOSIS — O09.90 SUPERVISION OF HIGH RISK PREGNANCY, ANTEPARTUM: ICD-10-CM

## 2022-10-24 PROCEDURE — 76805 OB US >/= 14 WKS SNGL FETUS: CPT | Mod: TC | Performed by: RADIOLOGY

## 2022-10-27 ENCOUNTER — OFFICE VISIT (OUTPATIENT)
Dept: FAMILY MEDICINE | Facility: CLINIC | Age: 22
End: 2022-10-27
Payer: COMMERCIAL

## 2022-10-27 VITALS
DIASTOLIC BLOOD PRESSURE: 68 MMHG | SYSTOLIC BLOOD PRESSURE: 103 MMHG | OXYGEN SATURATION: 99 % | WEIGHT: 111.6 LBS | TEMPERATURE: 98.5 F | BODY MASS INDEX: 22.65 KG/M2 | RESPIRATION RATE: 16 BRPM | HEART RATE: 81 BPM

## 2022-10-27 DIAGNOSIS — Z01.84 LACK OF IMMUNITY TO HEPATITIS B VIRUS DEMONSTRATED BY SEROLOGIC TEST: ICD-10-CM

## 2022-10-27 DIAGNOSIS — L01.00 IMPETIGO: Primary | ICD-10-CM

## 2022-10-27 DIAGNOSIS — Z34.01 ENCOUNTER FOR SUPERVISION OF NORMAL FIRST PREGNANCY IN FIRST TRIMESTER: ICD-10-CM

## 2022-10-27 PROCEDURE — 99207 PR PRENATAL VISIT: CPT | Mod: GC | Performed by: STUDENT IN AN ORGANIZED HEALTH CARE EDUCATION/TRAINING PROGRAM

## 2022-10-27 RX ORDER — MUPIROCIN 20 MG/G
OINTMENT TOPICAL 3 TIMES DAILY
Qty: 30 G | Refills: 1 | Status: SHIPPED | OUTPATIENT
Start: 2022-10-27 | End: 2023-02-08

## 2022-10-27 NOTE — NURSING NOTE
Due to patient being non-English speaking/uses sign language, an  was used for this visit. Only for face-to-face interpretation by an external agency, date and length of interpretation can be found on the scanned worksheet.     name: Julianne Kirkpatrick  Agency: [employer]  Language: Nahed   Telephone number: [phone]  Type of interpretation: Face-to-face, spoken

## 2022-10-27 NOTE — PROGRESS NOTES
Preceptor Attestation:   Patient seen, evaluated and discussed with the resident. I have verified the content of the note, which accurately reflects my assessment of the patient and the plan of care.   Supervising Physician:  Gio Reid MD
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
## 2022-10-27 NOTE — PATIENT INSTRUCTIONS
Thank you for coming to [facility] for your care.  It was a pleasure to take care of you!    Here is what we discussed:  For the rectal area pain, apply Witch Hazel to cotton pad at least 2 times per day after a bowel movement and as needed   For your face, it is OK to use the same cream as on your body  Continue taking your prenatal vitamin  For the nipple rash , apply mupirocin ointment 2 times daily to a cotton pad  Schedule the ultrasound on your way out    Please call our clinic ([phone]) or send a message via [employer] with any questions or concerns!    Dr. Sol Jorge

## 2022-10-31 ENCOUNTER — ANCILLARY PROCEDURE (OUTPATIENT)
Dept: ULTRASOUND IMAGING | Facility: CLINIC | Age: 22
End: 2022-10-31
Attending: FAMILY MEDICINE
Payer: COMMERCIAL

## 2022-10-31 DIAGNOSIS — Z34.01 ENCOUNTER FOR SUPERVISION OF NORMAL FIRST PREGNANCY IN FIRST TRIMESTER: ICD-10-CM

## 2022-10-31 PROCEDURE — 76816 OB US FOLLOW-UP PER FETUS: CPT | Mod: TC | Performed by: RADIOLOGY

## 2022-11-30 ENCOUNTER — OFFICE VISIT (OUTPATIENT)
Dept: FAMILY MEDICINE | Facility: CLINIC | Age: 22
End: 2022-11-30
Payer: COMMERCIAL

## 2022-11-30 VITALS
RESPIRATION RATE: 16 BRPM | BODY MASS INDEX: 24.19 KG/M2 | WEIGHT: 119.2 LBS | DIASTOLIC BLOOD PRESSURE: 65 MMHG | HEART RATE: 84 BPM | OXYGEN SATURATION: 100 % | SYSTOLIC BLOOD PRESSURE: 99 MMHG | TEMPERATURE: 98.3 F

## 2022-11-30 DIAGNOSIS — N64.4 NIPPLE PAIN: ICD-10-CM

## 2022-11-30 DIAGNOSIS — Z34.01 ENCOUNTER FOR SUPERVISION OF NORMAL FIRST PREGNANCY IN FIRST TRIMESTER: ICD-10-CM

## 2022-11-30 DIAGNOSIS — Z34.01 ENCOUNTER FOR SUPERVISION OF NORMAL FIRST PREGNANCY IN FIRST TRIMESTER: Primary | ICD-10-CM

## 2022-11-30 DIAGNOSIS — D22.9 BENIGN MOLE: ICD-10-CM

## 2022-11-30 DIAGNOSIS — Z34.02 ENCOUNTER FOR SUPERVISION OF NORMAL FIRST PREGNANCY IN SECOND TRIMESTER: Primary | ICD-10-CM

## 2022-11-30 LAB
BASOPHILS # BLD AUTO: 0 10E3/UL (ref 0–0.2)
BASOPHILS NFR BLD AUTO: 0 %
EOSINOPHIL # BLD AUTO: 0.2 10E3/UL (ref 0–0.7)
EOSINOPHIL NFR BLD AUTO: 2 %
ERYTHROCYTE [DISTWIDTH] IN BLOOD BY AUTOMATED COUNT: 13.6 % (ref 10–15)
GLUCOSE 1H P 50 G GLC PO SERPL-MCNC: 98 MG/DL (ref 70–129)
HCT VFR BLD AUTO: 35 % (ref 35–47)
HCV AB SERPL QL IA: NONREACTIVE
HGB BLD-MCNC: 12 G/DL (ref 11.7–15.7)
IMM GRANULOCYTES # BLD: 0.1 10E3/UL
IMM GRANULOCYTES NFR BLD: 1 %
LYMPHOCYTES # BLD AUTO: 1.8 10E3/UL (ref 0.8–5.3)
LYMPHOCYTES NFR BLD AUTO: 18 %
MCH RBC QN AUTO: 32.8 PG (ref 26.5–33)
MCHC RBC AUTO-ENTMCNC: 34.3 G/DL (ref 31.5–36.5)
MCV RBC AUTO: 96 FL (ref 78–100)
MONOCYTES # BLD AUTO: 0.6 10E3/UL (ref 0–1.3)
MONOCYTES NFR BLD AUTO: 7 %
NEUTROPHILS # BLD AUTO: 7.2 10E3/UL (ref 1.6–8.3)
NEUTROPHILS NFR BLD AUTO: 73 %
PLATELET # BLD AUTO: 221 10E3/UL (ref 150–450)
RBC # BLD AUTO: 3.66 10E6/UL (ref 3.8–5.2)
WBC # BLD AUTO: 9.9 10E3/UL (ref 4–11)

## 2022-11-30 PROCEDURE — 86803 HEPATITIS C AB TEST: CPT | Performed by: STUDENT IN AN ORGANIZED HEALTH CARE EDUCATION/TRAINING PROGRAM

## 2022-11-30 PROCEDURE — 86780 TREPONEMA PALLIDUM: CPT | Performed by: STUDENT IN AN ORGANIZED HEALTH CARE EDUCATION/TRAINING PROGRAM

## 2022-11-30 PROCEDURE — 36415 COLL VENOUS BLD VENIPUNCTURE: CPT | Performed by: STUDENT IN AN ORGANIZED HEALTH CARE EDUCATION/TRAINING PROGRAM

## 2022-11-30 PROCEDURE — 85025 COMPLETE CBC W/AUTO DIFF WBC: CPT | Performed by: STUDENT IN AN ORGANIZED HEALTH CARE EDUCATION/TRAINING PROGRAM

## 2022-11-30 PROCEDURE — 82950 GLUCOSE TEST: CPT | Performed by: STUDENT IN AN ORGANIZED HEALTH CARE EDUCATION/TRAINING PROGRAM

## 2022-11-30 PROCEDURE — 99207 PR PRENATAL VISIT: CPT | Mod: GC | Performed by: STUDENT IN AN ORGANIZED HEALTH CARE EDUCATION/TRAINING PROGRAM

## 2022-11-30 NOTE — PROGRESS NOTES
RETURN OB VISIT 24-29W  25w4d     Assessment     22 year old B POS  at 25w4d with ROQUE Mar 11, 2023 based on Patient's last menstrual period was 2022 (within days). c/w 12w3d U/S.   Pregravid BMI: 20.7.  Weight gain has been good.      Risk Assessment   At Risk Category (up to 3)  Teen pregnancy: No  Poor social situation: No  Domestic abuse: No  Financial difficulties: No  Smoker: No  H/O  deliver: No  H/O drug abuse: No  Non-English speaking: Yes  Advanced maternal age: No  GDM risks: No  Previous C/S: No  H/O PIH: No  H/O STIs: No  H/O mental health concerns: No  Onset care > 20 weeks: No    Risk: At Risk   Date determined: 21           Nic Resendez was seen today for prenatal care.    Diagnoses and all orders for this visit:    Encounter for supervision of normal first pregnancy in second trimester  - CBC done  -Past glucose screen  - Mandatory second risk screening completed  - Discussed fetal movement (call if none in 24 hours) and warning signs (bleeding, cramping, edema, HA, visual disturbances, fluid leakage)    - Discussed birth control.  No birth control desired at this time.  Discussed no intercourse x 6 weeks postpartum.  -Still needs a second hepatitis B, left before administration today due to lab closing.  -     Glucose tolerance gest screen 1 hour  -     Syphilis Screen Cascade (RPR/VDRL)  -     Hepatitis C Screen Reflex to HCV RNA Quant and Genotype  -     CBC with Platelets & Differential  Fundal height is lower than expected.  Recommend repeat ultrasound to assess fetal growth    Nipple pain  Discussed lanolin, pads, measures to decrease nipple stimulation.    Benign mole  See photo in media tab.  No concerning features today.  Follow up discussed.  Return if change in size, border, color.    Other orders  -     Cancel: HEPATITIS B VACCINE,ADULT,IM          Return to clinic in 2 and 4 weeks     Staffed with Dr. Gillis.      Sol Jorge MD               Subjective  "  Dipti Kirkpatrick speaks Hmong so a phone  was used today.    11/30/22: 25w4d.  Weight gain adequate.  Has some pain and fullness shortly after eating.  Feels like she's getting full earlier.  Feels tight and makes it hard to breathe, but no burning.   Also has been having leaking of breast milk from right side x 1 month.  Cycle of leaking milk, sensitive nipple, healing, then milk leaking again.  She is mostly concerned that it is unilateral and this early in pregnancy.     Fundal height 23 cm.   bpm.  no edema.    Vital Signs 8/31/2022 9/7/2022 9/21/2022 10/5/2022 10/27/2022   Weight (LB) 102 lb 102 lb  106 lb 6.4 oz 111 lb 9.6 oz     Vital Signs 11/30/2022   Weight (LB) 119 lb 3.2 oz       10/27/22: 20w5d.  Weight gain adequate  Nipple dryness, dry skin on face, rectal pain suggestive of hemorrhoids.  Discussed supportive care for hemorrhoids, witch hazel tucks, fiber.  Mupirocin for crusty rash.  Reviewed treating dry skin on her face.  Reordered ultrasound d/t poor visualization of outflow tracts and face.  Otherwise normal ultrasound.  Fundal height 20 cm.   bpm.  no edema.     10/05/22: 17w4d.  Weight gain  Not found. Concern today is skin itching of face.  Discussed gentle cleanser, gentle moisturizer, as-needed hydrocortisone.  Benadryl at bedtime.  This has helped in the past but makes her sleepy.  Fundal height below the umbilicus cm.   bpm.  no edema.  09/07/2022: First OB visit.   09/01/2022: Met with OB provider.  1st trimester US unremarkable.    ROS:  No - Headache  No - Changes in vision  No - Chest Pain  No - Shortness of Breath  No - Nausea   YES - Vomiting \"when eating too much\" 1-2 times per week  YES - Abdominal pain; as above  No - Contractions  No - UTI symptoms  No - Vaginal Discharge    No - Vaginal Bleeding   No - Loss of Fluid   No - Extremity Swelling   Present - Fetal movement     Patient Active Problem List   Diagnosis     Language barrier, cultural " differences     First pregnancy     Supervision of high risk pregnancy, antepartum     Lack of immunity to hepatitis B virus demonstrated by serologic test     Guidance:    What to Expect: Backache, heartburn, shortness of breath, varicose veins, hemorrhoids, constipation, leg cramps, round ligament pain, vaginal discharge.  Warning Signs: Bleeding, cramping, edema, headaches, visual disturbances, fluid leakage           Objective     BP 99/65 (BP Location: Left arm, Patient Position: Sitting, Cuff Size: Adult Regular)   Pulse 84   Temp 98.3  F (36.8  C) (Oral)   Resp 16   Wt 54.1 kg (119 lb 3.2 oz)   LMP 05/24/2022 (Within Days)   SpO2 100%   BMI 24.19 kg/m      No distress.  Gravid abdomen.   Breast: Purple striae on lateral aspect of both breasts.  No asymmetry.  No palpable masses.  No rashes or discharge from right nipple.  Skin: <1cm mole with irregular borders on abdomen      Results for orders placed or performed in visit on 11/30/22 (from the past 24 hour(s))   Glucose tolerance gest screen 1 hour   Result Value Ref Range    Glu Gest Screen 1hr 50g 98 70 - 129 mg/dL    Narrative    This is a screening test for Gestational Diabetes Mellitus.   If results are 130 mg/dL or greater, a Standard 100 gram Gestational  3 hour Glucose Tolerance should be performed.   Hepatitis C Screen Reflex to HCV RNA Quant and Genotype   Result Value Ref Range    Hepatitis C Antibody Nonreactive Nonreactive    Narrative    Assay performance characteristics have not been established for newborns, infants, and children.   CBC with Platelets & Differential    Narrative    The following orders were created for panel order CBC with Platelets & Differential.  Procedure                               Abnormality         Status                     ---------                               -----------         ------                     CBC with platelets and d...[[id number]]  Abnormal            Final result                 Please view  results for these tests on the individual orders.   CBC with platelets and differential   Result Value Ref Range    WBC Count 9.9 4.0 - 11.0 10e3/uL    RBC Count 3.66 (L) 3.80 - 5.20 10e6/uL    Hemoglobin 12.0 11.7 - 15.7 g/dL    Hematocrit 35.0 35.0 - 47.0 %    MCV 96 78 - 100 fL    MCH 32.8 26.5 - 33.0 pg    MCHC 34.3 31.5 - 36.5 g/dL    RDW 13.6 10.0 - 15.0 %    Platelet Count 221 150 - 450 10e3/uL    % Neutrophils 73 %    % Lymphocytes 18 %    % Monocytes 7 %    % Eosinophils 2 %    % Basophils 0 %    % Immature Granulocytes 1 %    Absolute Neutrophils 7.2 1.6 - 8.3 10e3/uL    Absolute Lymphocytes 1.8 0.8 - 5.3 10e3/uL    Absolute Monocytes 0.6 0.0 - 1.3 10e3/uL    Absolute Eosinophils 0.2 0.0 - 0.7 10e3/uL    Absolute Basophils 0.0 0.0 - 0.2 10e3/uL    Absolute Immature Granulocytes 0.1 <=0.4 10e3/uL

## 2022-11-30 NOTE — PATIENT INSTRUCTIONS
Thank you for coming to [facility] for your care.  It was a pleasure to take care of you!    Here is what we discussed:      Please call our clinic ([phone]) or send a message via [employer] with any questions or concerns!    Dr. Sol Jorge

## 2022-11-30 NOTE — NURSING NOTE
Due to patient being non-English speaking/uses sign language, an  was used for this visit. Only for face-to-face interpretation by an external agency, date and length of interpretation can be found on the scanned worksheet.     name: [id number]  Agency:  [facility]    Language: Nahed   Telephone number: [phone]  Type of interpretation: Telephone, spoken

## 2022-12-01 LAB — T PALLIDUM AB SER QL: NONREACTIVE

## 2023-01-11 ENCOUNTER — OFFICE VISIT (OUTPATIENT)
Dept: FAMILY MEDICINE | Facility: CLINIC | Age: 23
End: 2023-01-11
Payer: COMMERCIAL

## 2023-01-11 VITALS
RESPIRATION RATE: 20 BRPM | BODY MASS INDEX: 25.45 KG/M2 | HEART RATE: 69 BPM | OXYGEN SATURATION: 97 % | TEMPERATURE: 99.1 F | DIASTOLIC BLOOD PRESSURE: 62 MMHG | SYSTOLIC BLOOD PRESSURE: 97 MMHG | WEIGHT: 125.4 LBS

## 2023-01-11 DIAGNOSIS — O26.843 FUNDAL HEIGHT LOW FOR DATES IN THIRD TRIMESTER: ICD-10-CM

## 2023-01-11 DIAGNOSIS — O09.90 SUPERVISION OF HIGH RISK PREGNANCY, ANTEPARTUM: Primary | ICD-10-CM

## 2023-01-11 PROCEDURE — 90472 IMMUNIZATION ADMIN EACH ADD: CPT | Performed by: STUDENT IN AN ORGANIZED HEALTH CARE EDUCATION/TRAINING PROGRAM

## 2023-01-11 PROCEDURE — 90715 TDAP VACCINE 7 YRS/> IM: CPT | Performed by: STUDENT IN AN ORGANIZED HEALTH CARE EDUCATION/TRAINING PROGRAM

## 2023-01-11 PROCEDURE — 90746 HEPB VACCINE 3 DOSE ADULT IM: CPT | Performed by: STUDENT IN AN ORGANIZED HEALTH CARE EDUCATION/TRAINING PROGRAM

## 2023-01-11 PROCEDURE — 90471 IMMUNIZATION ADMIN: CPT | Performed by: STUDENT IN AN ORGANIZED HEALTH CARE EDUCATION/TRAINING PROGRAM

## 2023-01-11 PROCEDURE — 99207 PR PRENATAL VISIT: CPT | Mod: 25 | Performed by: STUDENT IN AN ORGANIZED HEALTH CARE EDUCATION/TRAINING PROGRAM

## 2023-01-11 NOTE — Clinical Note
Good morning, Corazon! This mom did not get her ultrasound done after our last visit at which she was measuring small for dates and her fundal height is still quite low for her GA.  They usually come right at the end of the day due to her 's work schedule so that's been challenging.  I discussed with them that she will need an NST this week and an ultrasound ASAP.  Could you please call her  at [phone] (ideally around 12 pm but he said anytime should be OK) to help with scheduling the NST, ultrasound, and follow-up visit with me?   staff was already gone by the time our visit finished, which has happened a few times already unfortunately.  Thank you so much!! I don't know what we would do without you!!

## 2023-01-11 NOTE — PROGRESS NOTES
RETURN OB VISIT  31w4d     Assessment     22 year old B POS  at 31w4d with ROQUE Mar 11, 2023 based on Patient's last menstrual period was 2022 (within days). c/w 12w3d U/S.  Pregnancy has been complicated by fundal height being low for dates starting the third trimester, language barrier.  Pregravid BMI: 20.7.  Weight gain has been adequate             Plan     Dipti was seen today for prenatal care.    Diagnoses and all orders for this visit:    Supervision of high risk pregnancy, antepartum  -     US OB >14 Weeks Complete, Single (Fetal Survey) (AIC797); Future    Fundal height low for dates in third trimester  -     US OB Biophysical Profile w/o Non Stress (Single); Future    Other orders  -     TDAP VACCINE (Adacel, Boostrix)  [[id number]]  -     HEPATITIS B VACCINE ADULT 2 DOSE IM (ENGRIX-B/RECOMBIVAX HB)        HIGH RISK concerns  - In addition to routine prenatal care, patient will need close monitoring of fetal growth, NST this week, ultrasound ASAP  - Mandatory second risk screening completed last visit  - Discussed fetal movement (call if not in 24 hours).        Return to clinic in 2 weeks.  -GC/chlamydia      Staffed with Dr. Elias Jorge MD  PGY-3  [facility]               Subjective   Dipti Kirkpatrick speaks Hmong so a phone  was used today.    22: 31w4d.  Has been feeling baby move but only below her belly button.  Ultrasound was recommended but they have not scheduled it yet.  Plan to schedule soon. Getting hep b #2, Tdap today.  Has muscle cramps in her calves.  Discussed that this is normal.  Hgb wnl .  Fundal height still measuring low, 26.5 cm at 31w4d. Discussed plan to get NST tomorrow or Friday and repeat growth US w/ assessment of placenta ASAP.      22: 25w4d.  Weight gain adequate.  Has some pain and fullness shortly after eating.  Feels like she's getting full earlier.  Feels tight and makes it hard to breathe, but no burning.    Also has been having leaking of breast milk from right side x 1 month.  Cycle of leaking milk, sensitive nipple, healing, then milk leaking again.  She is mostly concerned that it is unilateral and this early in pregnancy.  Fundal height 23 cm.   bpm.  no edema.     10/27/22: 20w5d.  Weight gain adequate  Nipple dryness, dry skin on face, rectal pain suggestive of hemorrhoids.  Discussed supportive care for hemorrhoids, witch hazel tucks, fiber.  Mupirocin for crusty rash.  Reviewed treating dry skin on her face.  Reordered ultrasound d/t poor visualization of outflow tracts and face.  Otherwise normal ultrasound.  Fundal height 20 cm.   bpm.  no edema.     10/05/22: 17w4d.  Weight gain  Not found. Concern today is skin itching of face.  Discussed gentle cleanser, gentle moisturizer, as-needed hydrocortisone.  Benadryl at bedtime.  This has helped in the past but makes her sleepy.  Fundal height below the umbilicus cm.   bpm.  no edema.  09/07/2022: First OB visit.   09/01/2022: Met with OB provider.  1st trimester US unremarkable.      ROS:  No - Headache  No - Changes in vision  No - Chest Pain  No - Shortness of Breath  No - Nausea   No - Vomiting  No - Abdominal Pain   No - Contractions  No - UTI symptoms  No - Vaginal Discharge    No - Vaginal Bleeding   No - Loss of Fluid   No - Extremity Swelling   Present - Fetal Movement       Patient Active Problem List   Diagnosis     Language barrier, cultural differences     First pregnancy     Supervision of high risk pregnancy, antepartum     Lack of immunity to hepatitis B virus demonstrated by serologic test       Current Outpatient Medications   Medication     benzoyl peroxide (PANOXYL) 10 % external liquid     hydrocortisone (CORTAID) 1 % external ointment     mupirocin (BACTROBAN) 2 % external ointment     Prenatal Vit-Fe Fumarate-FA (PRENATAL VITAMINS) 28-0.8 MG TABS     pyridOXINE (VITAMIN B6) 25 MG tablet     tetrahydrozoline (VISINE) 0.05  % ophthalmic solution     valACYclovir (VALTREX) 1000 mg tablet     No current facility-administered medications for this visit.       Guidance:      What to Expect: Backache, heartburn, shortness of breath, varicose veins, hemorrhoids, constipation, leg cramps, round ligament pain, vaginal discharge.    Warning Signs: Bleeding, cramping, edema, headaches, visual disturbances, fluid leakage           Objective     BP 97/62   Pulse 69   Temp 99.1  F (37.3  C) (Oral)   Resp 20   Wt 56.9 kg (125 lb 6.4 oz)   LMP 05/24/2022 (Within Days)   SpO2 97%   BMI 25.45 kg/m      Pregravid BMI: Could not be calculated.   No distress.  Gravid abdomen.    Fundal height 26.5 cm.   .     no edema.      Results    I personally reviewed all pertinent lab results.     Office Visit on 11/30/2022   Component Date Value Ref Range Status     Glu Gest Screen 1hr 50g 11/30/2022 98  70 - 129 mg/dL Final     Treponema Antibody Total 11/30/2022 Nonreactive  Nonreactive Final     Hepatitis C Antibody 11/30/2022 Nonreactive  Nonreactive Final     WBC Count 11/30/2022 9.9  4.0 - 11.0 10e3/uL Final     RBC Count 11/30/2022 3.66 (L)  3.80 - 5.20 10e6/uL Final     Hemoglobin 11/30/2022 12.0  11.7 - 15.7 g/dL Final     Hematocrit 11/30/2022 35.0  35.0 - 47.0 % Final     MCV 11/30/2022 96  78 - 100 fL Final     MCH 11/30/2022 32.8  26.5 - 33.0 pg Final     MCHC 11/30/2022 34.3  31.5 - 36.5 g/dL Final     RDW 11/30/2022 13.6  10.0 - 15.0 % Final     Platelet Count 11/30/2022 221  150 - 450 10e3/uL Final     % Neutrophils 11/30/2022 73  % Final     % Lymphocytes 11/30/2022 18  % Final     % Monocytes 11/30/2022 7  % Final     % Eosinophils 11/30/2022 2  % Final     % Basophils 11/30/2022 0  % Final     % Immature Granulocytes 11/30/2022 1  % Final     Absolute Neutrophils 11/30/2022 7.2  1.6 - 8.3 10e3/uL Final     Absolute Lymphocytes 11/30/2022 1.8  0.8 - 5.3 10e3/uL Final     Absolute Monocytes 11/30/2022 0.6  0.0 - 1.3 10e3/uL Final      Absolute Eosinophils 11/30/2022 0.2  0.0 - 0.7 10e3/uL Final     Absolute Basophils 11/30/2022 0.0  0.0 - 0.2 10e3/uL Final     Absolute Immature Granulocytes 11/30/2022 0.1  <=0.4 10e3/uL Final     No results found.

## 2023-01-11 NOTE — PROGRESS NOTES
Preceptor Attestation:  Vitals:    01/11/23 1621   BP: 97/62   Pulse: 69   Resp: 20   Temp: 99.1  F (37.3  C)   TempSrc: Oral   SpO2: 97%   Weight: 56.9 kg (125 lb 6.4 oz)          I discussed the patient with the resident and evaluated the patient in person. I have verified the content of the note, which accurately reflects my assessment of the patient and the plan of care.   Supervising Physician:  Niharika Griffin MD

## 2023-01-11 NOTE — PATIENT INSTRUCTIONS
Thank you for coming to [facility] for your care.  It was a pleasure to take care of you!    Here is what we discussed:  Corazon will call tomorrow to help you make an appointment to check baby's well-being, the ultrasound, and your next appointment for check-up  Drink lots of water and keep taking the prenatal vitamin    Please call our clinic ([phone]) or send a message via [employer] with any questions or concerns!    Dr. Sol Jorge

## 2023-01-12 ENCOUNTER — TELEPHONE (OUTPATIENT)
Dept: FAMILY MEDICINE | Facility: CLINIC | Age: 23
End: 2023-01-12

## 2023-01-12 PROBLEM — O26.843 FUNDAL HEIGHT LOW FOR DATES IN THIRD TRIMESTER: Status: ACTIVE | Noted: 2023-01-12

## 2023-01-12 NOTE — TELEPHONE ENCOUNTER
MEASURING SMALL FOR DATES: 1/11/23 Fundal height 26.5 cm at 31w4d gestation.      Spoke with pt's  and scheduled an APPT: Friday, 1/13/23 at 5:00 PM at [facility] for BPP with EFW and NST. Also scheduled RICCARDO APPT: Tuesday, 1/17/23 at 3:30 PM with Dr Jorge.  Stressed the importance of these appts since she is measuring so small.  He verbalized understanding and denied any questions./NG

## 2023-01-13 ENCOUNTER — HOSPITAL ENCOUNTER (OUTPATIENT)
Dept: ULTRASOUND IMAGING | Facility: CLINIC | Age: 23
Discharge: HOME | End: 2023-01-13
Attending: STUDENT IN AN ORGANIZED HEALTH CARE EDUCATION/TRAINING PROGRAM | Admitting: STUDENT IN AN ORGANIZED HEALTH CARE EDUCATION/TRAINING PROGRAM
Payer: COMMERCIAL

## 2023-01-13 ENCOUNTER — HOSPITAL ENCOUNTER (OUTPATIENT)
Facility: CLINIC | Age: 23
Discharge: HOME | End: 2023-01-13
Attending: FAMILY MEDICINE | Admitting: FAMILY MEDICINE
Payer: COMMERCIAL

## 2023-01-13 VITALS
RESPIRATION RATE: 14 BRPM | HEART RATE: 85 BPM | TEMPERATURE: 98.5 F | DIASTOLIC BLOOD PRESSURE: 67 MMHG | SYSTOLIC BLOOD PRESSURE: 108 MMHG

## 2023-01-13 DIAGNOSIS — O26.843 FUNDAL HEIGHT LOW FOR DATES IN THIRD TRIMESTER: ICD-10-CM

## 2023-01-13 PROCEDURE — 59025 FETAL NON-STRESS TEST: CPT

## 2023-01-13 PROCEDURE — 76819 FETAL BIOPHYS PROFIL W/O NST: CPT

## 2023-01-13 RX ORDER — LIDOCAINE 40 MG/G
CREAM TOPICAL
Status: DISCONTINUED | OUTPATIENT
Start: 2023-01-13 | End: 2023-01-13 | Stop reason: HOSPADM

## 2023-01-13 NOTE — PROGRESS NOTES
Pt here for NST following BPP for fundal height small for dates.  Pt denies any new concerns and states that baby has been moving normally. Pamella Anton RN

## 2023-01-14 NOTE — DISCHARGE INSTRUCTIONS
Undelivered Patients Discharge Instructions: Hmtony    Tus kws avery mob saib koj vim: Fetal Assessment  Peb hais kom koj: Dr Phoenix Fabian tau (Kuaj los yog Tshuaj):NST    Josh noj mov:   Drink 8 to 12 glasses of liquids (milk, juice, water) every day.  You may eat meals and snacks.     Ua zog:  Call your doctor or nurse midwife if your baby is moving less than usual.     Hu koj tus kws avery mob yog tias koj pom:  Koj lub ntsej muag o o los yog koj txhais cassi los sis txhais ceg o o ntxiv tuaj.  Josh mob diomedes farzaneh uas Tylenol (acetaminophen) tsis pab.  Koj txoj josh pom josh pauv (qhov muag plooj plooj: ua sacha koj pom love teev los yog love qhov ci ci.)  Xeev siab (mob plab) thiab ntuav.   Nce li 5 phaus los sis ntau tshaj ntawd hauv ib burnett piam.  Kub hauv siab uas tsis ploj mus.  Love yam uas qhia tias muaj mob zais zis: mob thaum koj  zis, yuav tsum alphonso zis ntau zaus thiab xav dim zis heev.  Lub zais dej tawg, los sis koj xau dej sacha koj lub ris hauv qab.  Ntshav liab ploog uas los koj lub ris hauv qab.  Mob ntawm koj plab mog los sis lub plab.  Hais txog tus me nyuam thib ib: Mob plab yuav yug me nyuam ntau tshaj txhua 5 feeb tau ib teev rov richard.  Hais txog tus me nyuam thib ob (rov richard): Mob plab yuav yug me nyuam ntau tshaj 10 feeb thiab mob zuj zus ntxiv.  Qhov uas tawm kua hauv qhov chaw mos hloov los yog loj tuaj (saib yog xim dab tsi thiab npaum vicky norma).    Ua ntej dhau 37 lub burnett tiam, lve li hu tuaj yog tias koj pom love yam nram qab no:  Muaj mob plab txhua kaum feeb los yog ntau caty   Tawm kua hauv qhov chaw mos  Hnov ceev ntawm cov txha ntsag  Mob nraub qaum me ntsis  Josh mob plab uas zoo li koj coj khaub ncaws  Mob plab thaum raws plab los yog thaum tsis raws plab    Rov qab xyuas:     Lwm yam: As scheduled in the clinic

## 2023-01-14 NOTE — PROGRESS NOTES
Discharge instructions reviewed with pt using  line.  Pt to discharge to home and follow up in clinic at scheduled appt next week.  Pamella Anton RN

## 2023-01-17 ENCOUNTER — OFFICE VISIT (OUTPATIENT)
Dept: FAMILY MEDICINE | Facility: CLINIC | Age: 23
End: 2023-01-17
Payer: COMMERCIAL

## 2023-01-17 VITALS
TEMPERATURE: 98 F | SYSTOLIC BLOOD PRESSURE: 104 MMHG | HEART RATE: 80 BPM | WEIGHT: 126.8 LBS | DIASTOLIC BLOOD PRESSURE: 68 MMHG | RESPIRATION RATE: 16 BRPM | OXYGEN SATURATION: 98 % | BODY MASS INDEX: 25.73 KG/M2

## 2023-01-17 DIAGNOSIS — O09.90 SUPERVISION OF HIGH RISK PREGNANCY, ANTEPARTUM: Primary | ICD-10-CM

## 2023-01-17 DIAGNOSIS — Z11.3 SCREENING FOR STDS (SEXUALLY TRANSMITTED DISEASES): ICD-10-CM

## 2023-01-17 DIAGNOSIS — Z12.4 CERVICAL CANCER SCREENING: ICD-10-CM

## 2023-01-17 DIAGNOSIS — O36.5990 POOR FETAL GROWTH AFFECTING MANAGEMENT OF MOTHER, ANTEPARTUM, SINGLE OR UNSPECIFIED FETUS: ICD-10-CM

## 2023-01-17 DIAGNOSIS — O36.5990 PREGNANCY AFFECTED BY FETAL GROWTH RESTRICTION: ICD-10-CM

## 2023-01-17 PROCEDURE — 99207 PR PRENATAL VISIT: CPT | Mod: GC | Performed by: STUDENT IN AN ORGANIZED HEALTH CARE EDUCATION/TRAINING PROGRAM

## 2023-01-17 PROCEDURE — 87591 N.GONORRHOEAE DNA AMP PROB: CPT | Performed by: STUDENT IN AN ORGANIZED HEALTH CARE EDUCATION/TRAINING PROGRAM

## 2023-01-17 PROCEDURE — H1001 ANTEPARTUM MANAGEMENT: HCPCS | Performed by: STUDENT IN AN ORGANIZED HEALTH CARE EDUCATION/TRAINING PROGRAM

## 2023-01-17 PROCEDURE — 87491 CHLMYD TRACH DNA AMP PROBE: CPT | Performed by: STUDENT IN AN ORGANIZED HEALTH CARE EDUCATION/TRAINING PROGRAM

## 2023-01-17 NOTE — PROGRESS NOTES
RETURN OB VISIT  32w3d     Assessment     22 year old B POS  at 32w3d with ROQUE Mar 11, 2023 based on Patient's last menstrual period was 2022 (within days). c/w 12w3d U/S.  Pregnancy has been complicated by fundal height being low for dates starting the third trimester, language barrier, fetal weight loss <10 %ile  in the third trimester.  Pregravid BMI: 20.7.  Weight gain has been adequate          Plan     Dipti was seen today for prenatal care.    Diagnoses and all orders for this visit:    Supervision of high risk pregnancy, antepartum  -     [facility] Referral - Pregnancy; Future  -     Breast Pump Order for DME - ONLY FOR DME    Screening for STDs (sexually transmitted diseases)  -     NEISSERIA GONORRHOEA PCR; Future  -     CHLAMYDIA TRACHOMATIS PCR; Future    Cervical cancer screening    Poor fetal growth affecting management of mother, antepartum, single or unspecified fetus    Fundal height low for dates in third trimester        HIGH RISK concerns  - In addition to routine prenatal care, patient will need weekly BPP's and follow-up with MFM    Return to clinic in 2 weeks.        Staffed with Dr. Alfie Jorge MD  PGY-3  [facility]               Subjective   Dipti Kirkpatrick speaks Hmong so a phone  was used today.    22: 32w3d.  11.2 kg (24 lb 12.8 oz) twg.  Feels well.  Feels baby move.  BPP on Friday was 8 out of 8.  Discussed results and neck steps with MFM and weekly BPP's.  Questions answered.    22: 31w4d.  Has been feeling baby move but only below her belly button.  Ultrasound was recommended but they have not scheduled it yet.  Plan to schedule soon. Getting hep b #2, Tdap today.  Has muscle cramps in her calves.  Discussed that this is normal.  Hgb wnl .  Fundal height still measuring low, 26.5 cm at 31w4d. Discussed plan to get NST tomorrow or Friday and repeat growth US w/ assessment of placenta ASAP.       22: 25w4d.   Weight gain adequate.  Has some pain and fullness shortly after eating.  Feels like she's getting full earlier.  Feels tight and makes it hard to breathe, but no burning.   Also has been having leaking of breast milk from right side x 1 month.  Cycle of leaking milk, sensitive nipple, healing, then milk leaking again.  She is mostly concerned that it is unilateral and this early in pregnancy.  Fundal height 23 cm.   bpm.  no edema.     10/27/22: 20w5d.  Weight gain adequate  Nipple dryness, dry skin on face, rectal pain suggestive of hemorrhoids.  Discussed supportive care for hemorrhoids, witch hazel tucks, fiber.  Mupirocin for crusty rash.  Reviewed treating dry skin on her face.  Reordered ultrasound d/t poor visualization of outflow tracts and face.  Otherwise normal ultrasound.  Fundal height 20 cm.   bpm.  no edema.     10/05/22: 17w4d.  Weight gain  Not found. Concern today is skin itching of face.  Discussed gentle cleanser, gentle moisturizer, as-needed hydrocortisone.  Benadryl at bedtime.  This has helped in the past but makes her sleepy.  Fundal height below the umbilicus cm.   bpm.  no edema.  09/07/2022: First OB visit.   09/01/2022: Met with OB provider.  1st trimester US unremarkable.        ROS:  No - Headache  No - Changes in vision  No - Chest Pain  No - Shortness of Breath  No - Nausea   No - Vomiting  No - Abdominal Pain   No - Contractions  No - UTI symptoms  No - Vaginal Discharge    No - Vaginal Bleeding   No - Loss of Fluid   No - Extremity Swelling   Present - Fetal Movement       Patient Active Problem List   Diagnosis     Language barrier, cultural differences     First pregnancy     Supervision of high risk pregnancy, antepartum     Lack of immunity to hepatitis B virus demonstrated by serologic test     Fundal height low for dates in third trimester       Current Outpatient Medications   Medication     Prenatal Vit-Fe Fumarate-FA (PRENATAL VITAMINS) 28-0.8 MG TABS      benzoyl peroxide (PANOXYL) 10 % external liquid     hydrocortisone (CORTAID) 1 % external ointment     mupirocin (BACTROBAN) 2 % external ointment     pyridOXINE (VITAMIN B6) 25 MG tablet     tetrahydrozoline (VISINE) 0.05 % ophthalmic solution     valACYclovir (VALTREX) 1000 mg tablet     No current facility-administered medications for this visit.         Going to [facility]? Yes     Guidance:      What to Expect: Backache, heartburn, shortness of breath, varicose veins, hemorrhoids, constipation, leg cramps, round ligament pain, vaginal discharge.    Warning Signs: Bleeding, cramping, edema, headaches, visual disturbances, fluid leakage           Objective     /68 (BP Location: Left arm, Patient Position: Sitting, Cuff Size: Adult Regular)   Pulse 80   Temp 98  F (36.7  C) (Oral)   Resp 16   Wt 57.5 kg (126 lb 12.8 oz)   LMP 05/24/2022 (Within Days)   SpO2 98%   BMI 25.73 kg/m      Pregravid BMI: 20.70.   No distress.  Gravid abdomen.    Fundal height 29 cm.   .     no edema.      Results    I personally reviewed all pertinent lab results.     Office Visit on 11/30/2022   Component Date Value Ref Range Status     Glu Gest Screen 1hr 50g 11/30/2022 98  70 - 129 mg/dL Final     Treponema Antibody Total 11/30/2022 Nonreactive  Nonreactive Final     Hepatitis C Antibody 11/30/2022 Nonreactive  Nonreactive Final     WBC Count 11/30/2022 9.9  4.0 - 11.0 10e3/uL Final     RBC Count 11/30/2022 3.66 (L)  3.80 - 5.20 10e6/uL Final     Hemoglobin 11/30/2022 12.0  11.7 - 15.7 g/dL Final     Hematocrit 11/30/2022 35.0  35.0 - 47.0 % Final     MCV 11/30/2022 96  78 - 100 fL Final     MCH 11/30/2022 32.8  26.5 - 33.0 pg Final     MCHC 11/30/2022 34.3  31.5 - 36.5 g/dL Final     RDW 11/30/2022 13.6  10.0 - 15.0 % Final     Platelet Count 11/30/2022 221  150 - 450 10e3/uL Final     % Neutrophils 11/30/2022 73  % Final     % Lymphocytes 11/30/2022 18  % Final     % Monocytes 11/30/2022 7  % Final     % Eosinophils  11/30/2022 2  % Final     % Basophils 11/30/2022 0  % Final     % Immature Granulocytes 11/30/2022 1  % Final     Absolute Neutrophils 11/30/2022 7.2  1.6 - 8.3 10e3/uL Final     Absolute Lymphocytes 11/30/2022 1.8  0.8 - 5.3 10e3/uL Final     Absolute Monocytes 11/30/2022 0.6  0.0 - 1.3 10e3/uL Final     Absolute Eosinophils 11/30/2022 0.2  0.0 - 0.7 10e3/uL Final     Absolute Basophils 11/30/2022 0.0  0.0 - 0.2 10e3/uL Final     Absolute Immature Granulocytes 11/30/2022 0.1  <=0.4 10e3/uL Final     No results found for any visits on 01/17/23.  No results found for this or any previous visit (from the past 24 hour(s)).    US OB BIOPHYS SINGLE GESTATION W MEASURE 1/13/2023 5:20 PM  INDICATION:  Fundal height low for dates in third trimester  COMPARISON: 10/31/2022, 10/24/2022   FINDINGS:  Single living fetus, cephalic presentation.  HEART RATE: 129 bpm.  SDP 3.4 cm.  PLACENTA: Posterior. No previa.  CERVIX: Not measured.  2/2 fetal breathing  2/2 fetal movements  2/2 fetal tone  2/2 amniotic fluid   Total biophysical profile 8/8     BIOMETRY:  Biparietal Diameter: 7.33 cm, 29 weeks, 3 days  Head Circumference: 27.05 cm, 29 weeks, 4 days  Abdominal Circumference: 26.96 cm, 31 weeks, 1 day  Femur Length: 5.8 cm, 30 weeks, 3 days     Estimated Fetal Weight: 1592 g  EFW Percentile: 9 percent     EDC by prior reported dating:  EDC by This US exam:     Composite Age prior reported dating:  Composite Age by This US:                                                                      IMPRESSION:  1.  Normal 8/8 biophysical profile.  2.  Single living intrauterine gestation.  3.  Based on prior dating, composite age of 31 weeks, 6 days with EDC 03/11/2023.  4.  Estimated fetal weight is in the 9th percentile.

## 2023-01-17 NOTE — PATIENT INSTRUCTIONS
Thank you for coming to [facility] for your care.  It was a pleasure to take care of you!    Here is what we discussed:  You will get a phone call from maternal-fetal medicine to schedule your BPPs, which will measure your baby's growth   Continue taking your prenatal vitamin  Please make an appointment in 2 weeks for follow-up with me    Please call our clinic ([phone]) or send a message via [employer] with any questions or concerns!    Dr. Sol Jorge

## 2023-01-18 ENCOUNTER — TRANSCRIBE ORDERS (OUTPATIENT)
Dept: MATERNAL FETAL MEDICINE | Facility: HOSPITAL | Age: 23
End: 2023-01-18
Payer: COMMERCIAL

## 2023-01-18 ENCOUNTER — APPOINTMENT (OUTPATIENT)
Dept: INTERPRETER SERVICES | Facility: CLINIC | Age: 23
End: 2023-01-18
Payer: COMMERCIAL

## 2023-01-18 DIAGNOSIS — O26.90 PREGNANCY RELATED CONDITION, ANTEPARTUM: Primary | ICD-10-CM

## 2023-01-18 LAB
C TRACH DNA SPEC QL NAA+PROBE: NEGATIVE
N GONORRHOEA DNA SPEC QL NAA+PROBE: NEGATIVE

## 2023-01-20 ENCOUNTER — TELEPHONE (OUTPATIENT)
Dept: FAMILY MEDICINE | Facility: CLINIC | Age: 23
End: 2023-01-20
Payer: COMMERCIAL

## 2023-01-20 NOTE — TELEPHONE ENCOUNTER
[facility] pt needs an ultrasound on Monday, 1/23/23 if would like at [place] otherwise will need to go to [facility] for BPP with cord doppler and NST at the beginning of the week and then should come back the end of the week to do NST due to Fetal Growth Restriction.  Pt is scheduled to see MFM on Wed, 2/1/23 for OB u/s and consult./Corazon Singh RN BSN

## 2023-01-21 NOTE — PROGRESS NOTES
1/21/23 Requested car seat for pt online through [employer].  They will contact pt once they are ready to deliver to arrange time for delivery and instruct pt on how to use car seat (usually takes 3 weeks from the time referral received to get call for delivery).  Emailed request for breast pump to [facility].  Breast pump will be delivered to pt's home./Corazon Singh RN BSN

## 2023-01-23 ENCOUNTER — APPOINTMENT (OUTPATIENT)
Dept: INTERPRETER SERVICES | Facility: CLINIC | Age: 23
End: 2023-01-23
Payer: COMMERCIAL

## 2023-01-24 ENCOUNTER — HOSPITAL ENCOUNTER (OUTPATIENT)
Dept: ULTRASOUND IMAGING | Facility: CLINIC | Age: 23
Discharge: HOME | End: 2023-01-24
Attending: FAMILY MEDICINE
Payer: COMMERCIAL

## 2023-01-24 ENCOUNTER — HOSPITAL ENCOUNTER (OUTPATIENT)
Facility: CLINIC | Age: 23
Discharge: HOME | End: 2023-01-24
Attending: FAMILY MEDICINE | Admitting: FAMILY MEDICINE
Payer: COMMERCIAL

## 2023-01-24 VITALS
SYSTOLIC BLOOD PRESSURE: 107 MMHG | TEMPERATURE: 98.3 F | HEART RATE: 76 BPM | DIASTOLIC BLOOD PRESSURE: 61 MMHG | RESPIRATION RATE: 16 BRPM

## 2023-01-24 DIAGNOSIS — O09.90 SUPERVISION OF HIGH RISK PREGNANCY, ANTEPARTUM: ICD-10-CM

## 2023-01-24 DIAGNOSIS — O36.5990 PREGNANCY AFFECTED BY FETAL GROWTH RESTRICTION: ICD-10-CM

## 2023-01-24 PROCEDURE — 76819 FETAL BIOPHYS PROFIL W/O NST: CPT

## 2023-01-24 PROCEDURE — G0463 HOSPITAL OUTPT CLINIC VISIT: HCPCS

## 2023-01-24 RX ORDER — LIDOCAINE 40 MG/G
CREAM TOPICAL
Status: DISCONTINUED | OUTPATIENT
Start: 2023-01-24 | End: 2023-01-24 | Stop reason: HOSPADM

## 2023-01-24 ASSESSMENT — ACTIVITIES OF DAILY LIVING (ADL): ADLS_ACUITY_SCORE: 31

## 2023-01-24 NOTE — PROGRESS NOTES
OB Triage Note        Assessment and Plan:     Dipti Kirkpatrick is a 22 year old  at 33w3d not in labor. Membranes are intact. She presents to have NST completed for due to IUGR. BPP scoring of 8/8 and reactive NST.  Patient Active Problem List   Diagnosis     Language barrier, cultural differences     First pregnancy     Supervision of high risk pregnancy, antepartum     Lack of immunity to hepatitis B virus demonstrated by serologic test     Pregnancy affected by fetal growth restriction     Discharge home  Follow-up with Dr. Jorge on 2023  Follow-up with [facility] on 2023      Patient discussed with attending physician, Dr. Andrei Wright, who agrees with the plan.      Ruthie Gamble MD PGY2 2023  [facility]       Subjective:     Dipti Kirkpatrick is a  22 year old female at 33w3d with a current prenatal history significant for IUGR, SGA, and language barrier who presents to OB triage for completion of NST for IUGR. BPP completed today with a score of 8/8.    Dipti Kirkpatrick is a patient of Sol Valdez from [facility].     She reports denies any contractions. She denies fluid leakage. She denies bleeding per vagina. Fetal movement is .normal.  Estimated Date of Delivery: Mar 11, 2023 Patient's last menstrual period was 2022 (within days).     Her prenatal course has been complicated by IUGR, SGA, and language barrier.    Prenatal labs:   Lab Results   Component Value Date    AS Negative 2022    HEPBANG Nonreactive 2022    CHPCRT Negative 2023    GCPCRT Negative 2023    HGB 12.0 2022          Review of Systems:   CONSTITUTIONAL: no fatigue, no unexpected change in weight  SKIN: no worrisome rashes or lesions  EYES: no acute vision problems or changes  RESP: no significant cough         Physical Exam:   Vitals:   /61 (BP Location: Right arm, Patient Position: Semi-Martinez's,  "Cuff Size: Adult Regular)   Pulse 76   Temp 98.3  F (36.8  C) (Oral)   Resp 16   LMP 05/24/2022 (Within Days)   0 lbs 0 oz  Estimated body mass index is 25.73 kg/m  as calculated from the following:    Height as of 9/7/22: 1.495 m (4' 10.86\").    Weight as of 1/17/23: 57.5 kg (126 lb 12.8 oz).    GEN: Awake, alert in no apparent distress   HEENT: grossly normal  RESPIRATORY: speaking in full sentences, no use of accessory   ABDOMEN: gravid    NST interpretation:  Baseline rate 122 normal  Accelerations present  Decelerations not present  Interpretation: reactive    Labs today:  No results found for any visits on 01/24/23.    "

## 2023-01-24 NOTE — PROGRESS NOTES
Pt [place] [facility] [place] after BPP. . 33.3wks. Pt brought to room , placed on EFM. FHT's reactive, category I. Uterus soft, non tender. Occasional ctx's noted with irritability. Pt denies feeling ctx's. VSS. BPP .  Provider aware, order to discharge pt home. To follow up in clinic as scheduled. Pt states understanding.  used for  this visit.

## 2023-01-25 ENCOUNTER — TELEPHONE (OUTPATIENT)
Dept: FAMILY MEDICINE | Facility: CLINIC | Age: 23
End: 2023-01-25
Payer: COMMERCIAL

## 2023-01-25 DIAGNOSIS — O36.5990 PREGNANCY AFFECTED BY FETAL GROWTH RESTRICTION: Primary | ICD-10-CM

## 2023-01-25 NOTE — TELEPHONE ENCOUNTER
Called pt's  and discussed since the baby is at 2% for weight it is concerning that baby is not growing well.  Discussed that his wife needs to be having twice weekly monitoring until she delivers.  He states that there is no way that he can bring her in this week and he does not want her to come by insurance taxi.  He states that they could come on Monday for BPP/NST at 3:40 PM.  Told him that she still needs to go to the appt at [facility] on 2/1/23.  Discussed that if she is not feeling the baby move regularly then she needs to go to [facility]!!  Told him to call if they have any questions or concerns.  Routed note to Dr Jorge./NANCIE

## 2023-01-27 ENCOUNTER — PRE VISIT (OUTPATIENT)
Dept: MATERNAL FETAL MEDICINE | Facility: HOSPITAL | Age: 23
End: 2023-01-27
Payer: COMMERCIAL

## 2023-01-30 ENCOUNTER — ANCILLARY PROCEDURE (OUTPATIENT)
Dept: ULTRASOUND IMAGING | Facility: CLINIC | Age: 23
End: 2023-01-30
Attending: STUDENT IN AN ORGANIZED HEALTH CARE EDUCATION/TRAINING PROGRAM
Payer: COMMERCIAL

## 2023-01-30 ENCOUNTER — ALLIED HEALTH/NURSE VISIT (OUTPATIENT)
Dept: FAMILY MEDICINE | Facility: CLINIC | Age: 23
End: 2023-01-30
Payer: COMMERCIAL

## 2023-01-30 DIAGNOSIS — O36.5990 PREGNANCY AFFECTED BY FETAL GROWTH RESTRICTION: Primary | ICD-10-CM

## 2023-01-30 DIAGNOSIS — O36.5990 PREGNANCY AFFECTED BY FETAL GROWTH RESTRICTION: ICD-10-CM

## 2023-01-30 PROCEDURE — 76819 FETAL BIOPHYS PROFIL W/O NST: CPT | Mod: TC | Performed by: RADIOLOGY

## 2023-01-30 PROCEDURE — 59025 FETAL NON-STRESS TEST: CPT | Mod: 26

## 2023-01-30 PROCEDURE — 99207 PR NO BILLABLE SERVICE THIS VISIT: CPT

## 2023-01-30 NOTE — NURSING NOTE
Due to patient being non-English speaking/uses sign language, an  was used for this visit. Only for face-to-face interpretation by an external agency, date and length of interpretation can be found on the scanned worksheet.     name: Candy Pardo  Agency: [employer]  Language: Nahed   Telephone number: [phone]  Type of interpretation: Telephone, spoken

## 2023-01-31 NOTE — PROGRESS NOTES
Reactive NST- see scanned documentation.  Stressed the importance of fetal kick counts and reviewed when to call/go to [facility].  Reminded appt with MFM on Wednesday, 23 and see Dr Jorge on Friday, 2/3/23.  Discussed will likely need twice weekly  testing and M will let them know the plan.  Discussed that  testing could be done at [facility], [facility], and/or [facility].  Pt and  verbalized understanding.      Due to patient being non-English speaking/uses sign language, an  was used for this visit. Only for face-to-face interpretation by an external agency, date and length of interpretation can be found on the scanned worksheet.      name: Candy Pardo  Agency: [employer]  Language: Nahed   Telephone number: [phone]  Type of interpretation: Telephone, spoken

## 2023-02-01 ENCOUNTER — TELEPHONE (OUTPATIENT)
Dept: FAMILY MEDICINE | Facility: CLINIC | Age: 23
End: 2023-02-01

## 2023-02-01 ENCOUNTER — OFFICE VISIT (OUTPATIENT)
Dept: MATERNAL FETAL MEDICINE | Facility: HOSPITAL | Age: 23
End: 2023-02-01
Attending: FAMILY MEDICINE
Payer: COMMERCIAL

## 2023-02-01 ENCOUNTER — ANCILLARY PROCEDURE (OUTPATIENT)
Dept: ULTRASOUND IMAGING | Facility: HOSPITAL | Age: 23
End: 2023-02-01
Attending: FAMILY MEDICINE
Payer: COMMERCIAL

## 2023-02-01 VITALS — HEART RATE: 65 BPM | SYSTOLIC BLOOD PRESSURE: 100 MMHG | DIASTOLIC BLOOD PRESSURE: 64 MMHG

## 2023-02-01 DIAGNOSIS — O36.5990 FETAL GROWTH RESTRICTION ANTEPARTUM: Primary | ICD-10-CM

## 2023-02-01 DIAGNOSIS — O26.90 PREGNANCY RELATED CONDITION, ANTEPARTUM: ICD-10-CM

## 2023-02-01 PROCEDURE — 76819 FETAL BIOPHYS PROFIL W/O NST: CPT

## 2023-02-01 PROCEDURE — 76811 OB US DETAILED SNGL FETUS: CPT | Mod: 26 | Performed by: OBSTETRICS & GYNECOLOGY

## 2023-02-01 PROCEDURE — 99203 OFFICE O/P NEW LOW 30 MIN: CPT | Mod: 25 | Performed by: OBSTETRICS & GYNECOLOGY

## 2023-02-01 NOTE — TELEPHONE ENCOUNTER
[facility] phone call message- general phone call:    Reason for call: she needs a call back re  they dont have anything next week for the ultrasound the patient is not  wanting to go to another location other then [facility] hey don't have anything she wants to know if they can come here next week to get the stress test done    Action desired: call back.    Return call needed: Yes    OK to leave a message on voice mail? Yes    Advised patient to response may take up to 2 business days: Yes    Primary language: Hmong      needed? Yes    Call taken on February 1, 2023 at 3:04 PM by Darien Lopez

## 2023-02-01 NOTE — PROGRESS NOTES
Thank-you for referring your patient for a comprehensive ultrasound due to suspected fetal growth restriction, which is confirmed today.    I discussed the findings on today's ultrasound with the patient and her partner using a phone . I reviewed the limitations of ultrasound both in detecting aneuploidy and structural abnormalities.  Ultrasound can routinely detect 80-90% of structural abnormalities. We focused on the followin. The findings on today's ultrasound are consistent with fetal growth restriction (FGR). We reviewed that we consider a pregnancy to be affected by FGR when the overall EFW is <10th% or if the abdominal circumference (AC) is < 10th% as they have been found to be equally predictive of SGA. We discussed the potential etiologies of FGR.      Her dating was reviewed and she is dated by a 12 week US. She has opted not to have genetic screening. She is otherwise healthy and has no significant medical problems. There is no family history of congential anomalies or genetic syndromes. She denies experiencing any viral symptoms this pregnancy.  She and her  are both of shorter stature and report a history of smaller neonates for other family members.     Her BP is normal today at 100/64.     We reviewed the possible/recommended work up for FGR. If a patient delivers at <34 weeks for placental insufficiency, including FGR, testing for antiphospholipid antibody syndrome is recommended with beta 2 glycoprotein IgG & IgM, cardiolipin antibody IgG & IgM and lupus anticoagulant.     Lastly, we reviewed that regardless of the etiology of FGR, we recommend additional monitoring due to the increased risk for stillbirth and that the ultimate goal of management rests on balancing the risk of stillbirth with the risks of prematurity.  The recommended surveillance and management of pregnancies complicated by FGR includes serial assessment of fetal growth (every 3 weeks) in addition to  weekly UA Dopplers and  surveillance. Timing of delivery will depend on Doppler findings, amniotic fluid, fetal monitoring, and interval fetal growth; based on her current findings I recommend delivery at 06g3f-19w6o.     We discussed that she will not need BPP or NST done elsewhere as we will see her for weekly US and NST here. She will continue with Dr. Judge for OB care.     I spent 10 minutes prior to the visit preparing to see the patient (reviewing medical records and tests). I spent 15 minutes face to face with the patient during the visit with the majority (> 50% ) of the time spent on counseling and coordination of care with the patient. I spent 5 minutes after the visit with the patient documenting the visit in the electronic health record and/or communicating with other health care professionals and/or care coordination.     Total time spent on today's day of service: 30 minutes.    Return to primary provider for continued prenatal care.    If you have questions regarding today's evaluation or if we can be of further service, please contact the [facility].    Isabel Mendoza MD     Maternal Fetal Medicine  [facility]

## 2023-02-01 NOTE — TELEPHONE ENCOUNTER
Called and spoke with Pa.  Told her that there are no ultrasound times available for Monday, 2/6 to do cord doppler but could do NST with RICCARDO on Friday, 2/10/23 at 3:30 PM.  Pa states that she will call the pt/ to let them know and will have  call back if this will not work./Corazon Singh RN BSN

## 2023-02-03 ENCOUNTER — OFFICE VISIT (OUTPATIENT)
Dept: FAMILY MEDICINE | Facility: CLINIC | Age: 23
End: 2023-02-03
Payer: COMMERCIAL

## 2023-02-03 VITALS
HEIGHT: 59 IN | BODY MASS INDEX: 25.4 KG/M2 | RESPIRATION RATE: 18 BRPM | SYSTOLIC BLOOD PRESSURE: 106 MMHG | DIASTOLIC BLOOD PRESSURE: 68 MMHG | WEIGHT: 126 LBS | OXYGEN SATURATION: 97 % | HEART RATE: 92 BPM | TEMPERATURE: 97.2 F

## 2023-02-03 DIAGNOSIS — O09.90 SUPERVISION OF HIGH RISK PREGNANCY, ANTEPARTUM: ICD-10-CM

## 2023-02-03 DIAGNOSIS — O36.5990 FETAL GROWTH RESTRICTION ANTEPARTUM: Primary | ICD-10-CM

## 2023-02-03 PROCEDURE — 99207 PR COMPLICATED OB VISIT: CPT | Mod: GC | Performed by: STUDENT IN AN ORGANIZED HEALTH CARE EDUCATION/TRAINING PROGRAM

## 2023-02-03 NOTE — PROGRESS NOTES
RETURN OB VISIT  34w6d     Assessment     22 year old B POS  at 34w6d with ROQUE Mar 11, 2023 based on Patient's last menstrual period was 2022 (within days). c/w 12w3d U/S.  Pregnancy has been complicated by fundal height being low for dates starting the third trimester, language barrier, fetal weight loss <10 %ile  in the third trimester.  Pregravid BMI: 20.7.  Weight gain has been adequate     She will not need BPP or NST done elsewhere as MFM will see her for weekly US and NST. She will continue with me for OB care.   **serial assessment of fetal growth (every 3 weeks) in addition to weekly UA Dopplers and  surveillance. Timing of delivery will depend on Doppler findings, amniotic fluid, fetal monitoring, and interval fetal growth; based on her current findings [facility] recommends delivery at 09o2a-24x1o.              Plan     Diagnoses and all orders for this visit:    Fetal growth restriction antepartum    Supervision of high risk pregnancy, antepartum        HIGH RISK concerns  - In addition to routine prenatal care, patient will need weekly BPP's and follow-up with MFM    Return to clinic in 1 week.    Staffed with Dr. Adriana Jorge MD  PGY-3  [facility]     22: 10.9 kg (24 lb).  Feels well.  Went to [facility].  Reviewed. She will not need BPP or NST done elsewhere as MFM will see her for weekly US and NST. She will continue with me for OB care.   **serial assessment of fetal growth (every 3 weeks) in addition to weekly UA Dopplers and  surveillance. Timing of delivery will depend on Doppler findings, amniotic fluid, fetal monitoring, and interval fetal growth; based on her current findings [facility] recommends delivery at [id number].       22: 32w3d.  11.2 kg (24 lb 12.8 oz) twg.  Feels well.  Feels baby move.  BPP on Friday was 8 out of 8.  Discussed results and neck steps with MF and weekly BPP's.  Questions  answered.     01/11/23: 31w4d.  Has been feeling baby move but only below her belly button.  Ultrasound was recommended but they have not scheduled it yet.  Plan to schedule soon. Getting hep b #2, Tdap today.  Has muscle cramps in her calves.  Discussed that this is normal.  Hgb wnl 11/30.  Fundal height still measuring low, 26.5 cm at 31w4d. Discussed plan to get NST tomorrow or Friday and repeat growth US w/ assessment of placenta ASAP.       11/30/22: 25w4d.  Weight gain adequate.  Has some pain and fullness shortly after eating.  Feels like she's getting full earlier.  Feels tight and makes it hard to breathe, but no burning.   Also has been having leaking of breast milk from right side x 1 month.  Cycle of leaking milk, sensitive nipple, healing, then milk leaking again.  She is mostly concerned that it is unilateral and this early in pregnancy.  Fundal height 23 cm.   bpm.  no edema.     10/27/22: 20w5d.  Weight gain adequate  Nipple dryness, dry skin on face, rectal pain suggestive of hemorrhoids.  Discussed supportive care for hemorrhoids, witch hazel tucks, fiber.  Mupirocin for crusty rash.  Reviewed treating dry skin on her face.  Reordered ultrasound d/t poor visualization of outflow tracts and face.  Otherwise normal ultrasound.  Fundal height 20 cm.   bpm.  no edema.     10/05/22: 17w4d.  Weight gain  Not found. Concern today is skin itching of face.  Discussed gentle cleanser, gentle moisturizer, as-needed hydrocortisone.  Benadryl at bedtime.  This has helped in the past but makes her sleepy.  Fundal height below the umbilicus cm.   bpm.  no edema.  09/07/2022: First OB visit.   09/01/2022: Met with OB provider.  1st trimester US unremarkable.       Dipti Kirkpatrick speaks Hmong so an  was used today.    ROS:  No - Headache  No - Changes in vision  No - Chest Pain  No - Shortness of Breath  No - Nausea   No - Vomiting  No - Abdominal Pain   No - Contractions  No - UTI  "symptoms  No - Vaginal Discharge    No - Vaginal Bleeding   No - Loss of Fluid   No - Extremity Swelling   Present - Fetal Movement       Patient Active Problem List   Diagnosis     Language barrier, cultural differences     First pregnancy     Supervision of high risk pregnancy, antepartum     Lack of immunity to hepatitis B virus demonstrated by serologic test     Pregnancy affected by fetal growth restriction       Current Outpatient Medications   Medication     benzoyl peroxide (PANOXYL) 10 % external liquid     hydrocortisone (CORTAID) 1 % external ointment     mupirocin (BACTROBAN) 2 % external ointment     Prenatal Vit-Fe Fumarate-FA (PRENATAL VITAMINS) 28-0.8 MG TABS     pyridOXINE (VITAMIN B6) 25 MG tablet     tetrahydrozoline (VISINE) 0.05 % ophthalmic solution     valACYclovir (VALTREX) 1000 mg tablet     No current facility-administered medications for this visit.         Going to [facility]? Yes     Guidance:      What to Expect: Backache, heartburn, shortness of breath, varicose veins, hemorrhoids, constipation, leg cramps, round ligament pain, vaginal discharge.    Warning Signs: Bleeding, cramping, edema, headaches, visual disturbances, fluid leakage           Objective     /68 (BP Location: Right arm, Patient Position: Sitting, Cuff Size: Adult Regular)   Pulse 92   Temp 97.2  F (36.2  C) (Tympanic)   Resp 18   Ht 1.486 m (4' 10.5\")   Wt 57.2 kg (126 lb)   LMP 05/24/2022 (Within Days)   SpO2 97%   BMI 25.89 kg/m      Pregravid BMI: 20.95.   No distress.  Gravid abdomen.    See prenatal flowsheet     Results    I personally reviewed all pertinent lab results.     No results found for any visits on 02/03/23.  [facility] Comprehensive Single    Result Date: 2/1/2023          Comprehensive --------------------------------------------------------------------------------------------------------- Pat. Name: SHABANA PARKS       Study Date:  02/01/2023 12:02pm Pat. NO:  [id number]        Referring  " MD: Jonn Judge Site:  [place]       Sonographer: Jeff Montgomery RDMS :  [id number]        Age:   22 --------------------------------------------------------------------------------------------------------- INDICATION --------------------------------------------------------------------------------------------------------- Fetal growth restriction (FGR) METHOD --------------------------------------------------------------------------------------------------------- Transabdominal ultrasound examination. View: Suboptimal view: limited by late gestational age PREGNANCY --------------------------------------------------------------------------------------------------------- Enriquez pregnancy. Number of fetuses: 1 DATING ---------------------------------------------------------------------------------------------------------                                          Date                                Details                                                                                      Gest. age                      ROQUE LMP                                  2022                                                                                                                         36 w + 1 d                     2023 Prior assessment               2022                          GA: 12 w + 5 d                                                                           34 w + 4 d                     3/11/2023 U/S                                   2023                          based upon AC, BPD, Femur, HC                                                 32 w + 3 d                     3/26/2023 Assigned dating                  Dating performed on 2023, based on the prior assessment (on 2022)                      34 w + 4 d                     3/11/2023 GENERAL EVALUATION --------------------------------------------------------------------------------------------------------- Cardiac activity  present.  bpm. Fetal movements present. Presentation cephalic. Placenta Posterior, No Previa, > 2 cm from internal os. Umbilical cord 3 vessel cord. Amniotic fluid Amount of AF: normal. MVP 4.7 cm. FETAL BIOMETRY --------------------------------------------------------------------------------------------------------- Main Fetal Biometry: BPD                                        83.1                    mm                         33w 3d                Hadlock OFD                                        97.9                    mm                         29w 0d                Nicolaides HC                                          290.3                  mm                          32w 0d                Hadlock Cerebellum tr                            42.7                   mm                          36w 5d                Nicolaides AC                                          291.2                  mm                          33w 1d        16%        Hadlock Femur                                      59.7                   mm                          31w 1d                Hadlock Humerus                                  53.2                    mm                         31w 0d                [facility] Fetal Weight Calculation: EFW                                       1,974                  g                                     6%         Hadlock EFW (lb,oz)                             4 lb 6                  oz EFW by                                        Hadlock (BPD-HC-AC-FL) Head / Face / Neck Biometry:                                            4.6                     mm CM                                          6.3                     mm Nasal bone                               10.6                   mm Extremities / Bony Struc Biometry: Radius                                     43.9                    mm                                 19%        Jaime Ulna                                        51.9                     mm                                 6%         Jaime Lt Humerus                              53.5                    mm                         31w 1d                Dat Lt Radius                                 43.9                    mm                                 19%        Jaime Lt Ulna                                     51.9                   mm                                  6%        Jaime Tibia                                        53.3                    mm                                 8%        Jaime Fibula                                      50.6                    mm                                 11%        Jaime Rt Femur                                 61.0                    mm                         31w 5d                 Hadlock Rt Tibia                                    53.8                    mm                                 11%        Jaime Rt Fibula                                  50.9                    mm                                 12%        Jaime Rt Foot                                    71.8                    mm Lt Femur                                  58.9                    mm                         30w 5d                Hadlock Lt Tibia                                    52.8                    mm                                 6%         Jaime Lt Fibula                                  50.2                    mm                                 9%         Jaime Lt Foot                                     63.9                   mm FETAL ANATOMY --------------------------------------------------------------------------------------------------------- Heart / Thorax                      Cardiac Rhythm: FHR (Atrial) 149 bpm The following structures appear normal: Head / Neck                         Cranium. Head size. Head shape. Lateral ventricles. Choroid plexus. Midline falx. Cavum septi pellucidi. Cerebellum. Cisterna magna.                                             Parenchyma. Thalami. Vermis.                                            Neck. Face                                   Lips. Nose. Maxilla. Mandible. Orbits. Lens. Heart / Thorax                      4-chamber view. RVOT view. LVOT view. Situs. Aortic arch view. Bicaval view. Ductal arch view. Superior vena cava. Inferior vena cava. 3-vessel                                            view. 3-vessel-trachea view. Cardiac position. Cardiac size. Cardiac rhythm.                                            Right lung. Left lung. Diaphragm. Abdomen                             Abdominal wall. Cord insertion. Stomach. Kidneys. Bladder. Liver. Bowel. Genitals. Spine                                  Thoracic spine. Lumbar spine. Sacral spine. Extremities / Skeleton          Right arm. Left arm. Right leg. Right foot. Left leg. Left foot. The following structures could not be adequately visualized: Face                                   Profile. Spine                                  Cervical spine. Extremities / Skeleton          Right hand. Left hand. Gender: female. FETAL DOPPLER --------------------------------------------------------------------------------------------------------- Umbilical Artery: normal. Sampling site: midcord PI                                            1.04                                                          82%        Agnieszka HR                                          141                     bpm MATERNAL STRUCTURES --------------------------------------------------------------------------------------------------------- Cervix                                  Not visualized Right Ovary                          Visualized Left Ovary                            Not visualized BIOPHYSICAL PROFILE --------------------------------------------------------------------------------------------------------- 2: Fetal breathing movements 2: Gross body movements 2: Fetal tone  2: Amniotic fluid volume  Biophysical profile score Interpretation: normal RECOMMENDATION --------------------------------------------------------------------------------------------------------- Thank-you for referring your patient for a comprehensive ultrasound due to suspected fetal growth restriction, which is confirmed today. I discussed the findings on today's ultrasound with the patient and her partner using a phone . I reviewed the limitations of ultrasound both in detecting aneuploidy and structural abnormalities. Ultrasound can routinely detect 80-90% of structural abnormalities. We focused on the followin. The findings on today's ultrasound are consistent with fetal growth restriction (FGR). We reviewed that we consider a pregnancy to be affected by FGR when the overall EFW is <10th% or if the abdominal circumference (AC) is < 10th% as they have been found to be equally predictive of SGA. We discussed the potential etiologies of FGR. Her dating was reviewed and she is dated by a 12 week US. She has opted not to have genetic screening. She is otherwise healthy and has no significant medical problems. There is no family history of congential anomalies or genetic syndromes. She denies experiencing any viral symptoms this pregnancy. She and her  are both of shorter stature and report a history of smaller neonates for other family members. Her BP is normal today at 100/64. We reviewed the possible/recommended work up for FGR. If a patient delivers at <34 weeks for placental insufficiency, including FGR, testing for antiphospholipid antibody syndrome is recommended with beta 2 glycoprotein IgG & IgM, cardiolipin antibody IgG & IgM and lupus anticoagulant. Lastly, we reviewed that regardless of the etiology of FGR, we recommend additional monitoring due to the increased risk for stillbirth and that the ultimate goal of management rests on balancing the risk of stillbirth with the risks  of prematurity. The recommended surveillance and management of pregnancies complicated by FGR includes serial assessment of fetal growth (every 3 weeks) in addition to weekly UA Dopplers and  surveillance. Timing of delivery will depend on Doppler findings, amniotic fluid, fetal monitoring, and interval fetal growth; based on her current findings I recommend delivery at 67f4f-62w7g. We discussed that she will not need BPP or NST done elsewhere as we will see her for weekly US and NST here. She will continue with Dr. Judge for OB care. I spent 10 minutes prior to the visit preparing to see the patient (reviewing medical records and tests). I spent 15 minutes face to face with the patient during the visit with the majority (> 50% ) of the time spent on counseling and coordination of care with the patient. I spent 5 minutes after the visit with the patient documenting the visit in the electronic health record and/or communicating with other health care professionals and/or care coordination. Total time spent on today's day of service: 30 minutes. Return to primary provider for continued prenatal care. If you have questions regarding today's evaluation or if we can be of further service, please contact the [facility].     IMPRESSION --------------------------------------------------------------------------------------------------------- 1. Enriquez intrauterine pregnancy at 34w4d gestational age by 12w5d US (not consistent with LMP dating) here for evaluation of fetal anatomy. 2. No fetal anomalies commonly detected by ultrasound or soft markers of aneuploidy were identified in the detailed fetal anatomic survey within the limits of prenatal ultrasound. 3. Growth parameters and estimated fetal weight were consistent with fetal growth restriction. She also has lagging proximal long bones, but no other findings concerning for skeletal dysplasia at this time on ultrasound. 4. The amniotic fluid  volume appeared normal. 5. UA Doppler flow is within normal limits. 6. NST was done in her primary OB office on Monday and was reassuring (reviewed today), so NST was not done today but a BPP was performed and was reassuring at 8/8.

## 2023-02-10 ENCOUNTER — OFFICE VISIT (OUTPATIENT)
Dept: FAMILY MEDICINE | Facility: CLINIC | Age: 23
End: 2023-02-10
Payer: COMMERCIAL

## 2023-02-10 VITALS
RESPIRATION RATE: 16 BRPM | OXYGEN SATURATION: 98 % | HEART RATE: 79 BPM | TEMPERATURE: 98.2 F | DIASTOLIC BLOOD PRESSURE: 64 MMHG | SYSTOLIC BLOOD PRESSURE: 97 MMHG

## 2023-02-10 DIAGNOSIS — O36.5990 PREGNANCY AFFECTED BY FETAL GROWTH RESTRICTION: Primary | ICD-10-CM

## 2023-02-10 DIAGNOSIS — O09.90 SUPERVISION OF HIGH RISK PREGNANCY, ANTEPARTUM: ICD-10-CM

## 2023-02-10 PROCEDURE — 99207 PR PRENATAL VISIT: CPT | Mod: GC

## 2023-02-10 PROCEDURE — 59025 FETAL NON-STRESS TEST: CPT | Mod: GC

## 2023-02-10 NOTE — PROGRESS NOTES
Preceptor Attestation:    I discussed the patient with the resident and evaluated the patient in person. I personally viewed the NST and agree with the interpretation documented by the resident. I have verified the content of the note, which accurately reflects my assessment of the patient and the plan of care.   Supervising Physician:  Andrei Wright MD.

## 2023-02-10 NOTE — PROGRESS NOTES
Assessment & Plan  Dipti Kirkpatrick is a 22 year old , at 35w6d weeks of pregnancy with ROQUE of Mar 11, 2023 by LMP consistent with 12 week ultrasound. Patient's history is high risk for the following reasons: Language barrier, FGR.     Dipti was seen today for prenatal care.    Diagnoses and all orders for this visit:    Pregnancy affected by fetal growth restriction    Supervision of high risk pregnancy, antepartum    Doing well today.  NST looked good today.  We will continue to follow with maternal-fetal medicine.  Follow-up with Dr. Andrews next week.    Weight gain adequate: 10.9 kg (24 lb)     There are no Patient Instructions on file for this visit.    Return to clinic in 2 weeks.    Luis Kay DO  I precepted today with Andrei Wright MD.    Subjective  Concerns: None. Discussed referral to M.     ROS:  No - Headache  No - Changes in vision  No - Chest Pain  No - Shortness of Breath  No - Nausea   No - Vomiting  No - Abdominal pain   No - Contractions  No - Dysuria   No - Vaginal Discharge    No - Vaginal bleeding   No - Loss of Fluid   No - Extremity swelling   Present - Fetal movement       Going to WIC? Yes    Patient Active Problem List   Diagnosis     Language barrier, cultural differences     First pregnancy     Supervision of high risk pregnancy, antepartum     Lack of immunity to hepatitis B virus demonstrated by serologic test     Pregnancy affected by fetal growth restriction       Dipti Kirkpatrick speaks Hmong so an  was used today.    Guidance:  travel    Objective  BP 97/64 (BP Location: Left arm, Patient Position: Prone, Cuff Size: Adult Regular)   Pulse 79   Temp 98.2  F (36.8  C) (Oral)   Resp 16   LMP 2022 (Within Days)   SpO2 98%   No distress.  Gravid abdomen.  no edema.  NST reactive.    Results  Blood type: B POS  No results found for any visits on 02/10/23.

## 2023-02-10 NOTE — Clinical Note
I saw your patient last week. Overall was doing well, did get NST done before I saw her even though I saw your note saying they would be done at [facility]. Reassuring though. CB

## 2023-02-15 ENCOUNTER — OFFICE VISIT (OUTPATIENT)
Dept: MATERNAL FETAL MEDICINE | Facility: HOSPITAL | Age: 23
End: 2023-02-15
Attending: OBSTETRICS & GYNECOLOGY
Payer: COMMERCIAL

## 2023-02-15 ENCOUNTER — ANCILLARY PROCEDURE (OUTPATIENT)
Dept: ULTRASOUND IMAGING | Facility: HOSPITAL | Age: 23
End: 2023-02-15
Attending: OBSTETRICS & GYNECOLOGY
Payer: COMMERCIAL

## 2023-02-15 DIAGNOSIS — O36.5990 FETAL GROWTH RESTRICTION ANTEPARTUM: ICD-10-CM

## 2023-02-15 PROCEDURE — 99207 PR NO CHARGE LOS: CPT | Performed by: STUDENT IN AN ORGANIZED HEALTH CARE EDUCATION/TRAINING PROGRAM

## 2023-02-15 PROCEDURE — 76820 UMBILICAL ARTERY ECHO: CPT | Mod: 26 | Performed by: STUDENT IN AN ORGANIZED HEALTH CARE EDUCATION/TRAINING PROGRAM

## 2023-02-15 PROCEDURE — 59025 FETAL NON-STRESS TEST: CPT

## 2023-02-15 PROCEDURE — 76815 OB US LIMITED FETUS(S): CPT

## 2023-02-15 PROCEDURE — 76815 OB US LIMITED FETUS(S): CPT | Mod: 26 | Performed by: STUDENT IN AN ORGANIZED HEALTH CARE EDUCATION/TRAINING PROGRAM

## 2023-02-15 PROCEDURE — 59025 FETAL NON-STRESS TEST: CPT | Mod: 26 | Performed by: STUDENT IN AN ORGANIZED HEALTH CARE EDUCATION/TRAINING PROGRAM

## 2023-02-15 NOTE — NURSING NOTE
Ipad  ID [id number] used for visit.    NST Performed due to FGR.  Dr. Caputo reviewed efm tracing. See NST/BPP Doc Flowsheet tab.      Dipti Kirkpatrick is a  at 36w4d who presents to [facility] for Limited/UAR US. Pt reports positive fetal movement. Pt denies bldg/lof/change in discharge, contractions, headache, vision changes, chest pain/SOB or edema. SBAR given to Dr. Caputo, see note in Epic.     Victoria Mckeon RN

## 2023-02-15 NOTE — PROGRESS NOTES
Please see the full imaging report from the ViewPoint program under the imaging tab.    Mercy Caputo MD  Maternal Fetal Medicine

## 2023-02-17 ENCOUNTER — OFFICE VISIT (OUTPATIENT)
Dept: FAMILY MEDICINE | Facility: CLINIC | Age: 23
End: 2023-02-17
Payer: COMMERCIAL

## 2023-02-17 VITALS
RESPIRATION RATE: 16 BRPM | DIASTOLIC BLOOD PRESSURE: 61 MMHG | WEIGHT: 131.8 LBS | BODY MASS INDEX: 27.08 KG/M2 | OXYGEN SATURATION: 97 % | HEART RATE: 72 BPM | TEMPERATURE: 98.1 F | SYSTOLIC BLOOD PRESSURE: 93 MMHG

## 2023-02-17 DIAGNOSIS — O36.5990 PREGNANCY AFFECTED BY FETAL GROWTH RESTRICTION: ICD-10-CM

## 2023-02-17 DIAGNOSIS — O09.90 SUPERVISION OF HIGH RISK PREGNANCY, ANTEPARTUM: Primary | ICD-10-CM

## 2023-02-17 PROCEDURE — 99207 PR PRENATAL VISIT: CPT | Mod: GC | Performed by: STUDENT IN AN ORGANIZED HEALTH CARE EDUCATION/TRAINING PROGRAM

## 2023-02-17 PROCEDURE — 87653 STREP B DNA AMP PROBE: CPT | Performed by: STUDENT IN AN ORGANIZED HEALTH CARE EDUCATION/TRAINING PROGRAM

## 2023-02-17 PROCEDURE — 87077 CULTURE AEROBIC IDENTIFY: CPT | Performed by: STUDENT IN AN ORGANIZED HEALTH CARE EDUCATION/TRAINING PROGRAM

## 2023-02-17 PROCEDURE — 87491 CHLMYD TRACH DNA AMP PROBE: CPT | Performed by: STUDENT IN AN ORGANIZED HEALTH CARE EDUCATION/TRAINING PROGRAM

## 2023-02-17 PROCEDURE — 87186 SC STD MICRODIL/AGAR DIL: CPT | Performed by: STUDENT IN AN ORGANIZED HEALTH CARE EDUCATION/TRAINING PROGRAM

## 2023-02-17 PROCEDURE — 87591 N.GONORRHOEAE DNA AMP PROB: CPT | Performed by: STUDENT IN AN ORGANIZED HEALTH CARE EDUCATION/TRAINING PROGRAM

## 2023-02-17 NOTE — PROGRESS NOTES
Preceptor Attestation:    I discussed the patient with the resident and evaluated the patient in person. I have verified the content of the note, which accurately reflects my assessment of the patient and the plan of care.   Supervising Physician:  Andrei Wright MD.

## 2023-02-17 NOTE — Clinical Note
Hello Dr. Behm, I didn't have any availability next week but this pt needs to be seen weekly d/t IUGR.  They have MFM ultrasound on 2/22.  The check up with you would be their last one because she will need to be induced unless MFM follow-up shows something more reassuring.  Her pregnancy has otherwise been uncomplicated.    Just a heads-up.  Thank you so much for seeing her!  Sol

## 2023-02-17 NOTE — PROGRESS NOTES
RETURN OB VISIT  36w6d     Assessment     22 year old B POS  at 36w6d with ROQUE Mar 11, 2023 based on Patient's last menstrual period was 2022 (within days). c/w 12w3d U/S.  Pregnancy has been complicated by fundal height being low for dates starting the third trimester, language barrier, fetal weight loss <10 %ile  in the third trimester.  Pregravid BMI: 20.7.  Weight gain has been adequate      She will not need BPP or NST done elsewhere as [facility] will see her for weekly US and NST. She will continue with me for OB care.   **serial assessment of fetal growth (every 3 weeks) in addition to weekly UA Dopplers and  surveillance. Timing of delivery will depend on Doppler findings, amniotic fluid, fetal monitoring, and interval fetal growth; based on her current findings [facility] recommends delivery at [id number]-07a5z.                Nic Resendez was seen today for prenatal care.    Diagnoses and all orders for this visit:    Supervision of high risk pregnancy, antepartum  -     Group B strep PCR  -     NEISSERIA GONORRHOEA PCR  -     CHLAMYDIA TRACHOMATIS PCR      HIGH RISK concerns   - In addition to routine prenatal care, patient will need delivery at 75r0d-11w4b.    - reviewed onset of labor   - repeat GC/Chlamydia 36w   - GBS 36w   - Discussed breastfeeding  - Discussed birth control options  - Needs help getting a car seat? No  - Needs help getting a breast pump? No      DELIVERY and POSTPARTUM PLANNING  - FOB will be present at the delivery.  - FOB to cut umbilical cord.  - Pain control: Natural  Epidural  - Plans to breastfeed: Yes  - OK with vitamin K, Hepatitis B, erythromycin  - Desires circumcision if infant is male: N/A  - Name of baby's clinic: [facility]  - Name of baby's doctor:  Sol Jorge MD      Return to clinic in 1 week.    Staffed with Dr. Adriana Jorge MD  PGY-3  [facility]               Subjective     Weight gain 13.5 kg (29 lb 12.8 oz):  adequate.      23: Doing well.  No complaints.  Good weight gain. Discussed ultrasound coming up with [facility].  Discussed epidural and delivery. Discussed  follow-up.      23: 10.9 kg (24 lb).  Feels well.  Went to [facility].  Reviewed. She will not need BPP or NST done elsewhere as [facility] will see her for weekly US and NST. She will continue with me for OB care.   **serial assessment of fetal growth (every 3 weeks) in addition to weekly UA Dopplers and  surveillance. Timing of delivery will depend on Doppler findings, amniotic fluid, fetal monitoring, and interval fetal growth; based on her current findings [facility] recommends delivery at 99i6j-53c3c.         23: 32w3d.  11.2 kg (24 lb 12.8 oz) twg.  Feels well.  Feels baby move.  BPP on Friday was 8 out of 8.  Discussed results and neck steps with [facility] and weekly BPP's.  Questions answered.     23: 31w4d.  Has been feeling baby move but only below her belly button.  Ultrasound was recommended but they have not scheduled it yet.  Plan to schedule soon. Getting hep b #2, Tdap today.  Has muscle cramps in her calves.  Discussed that this is normal.  Hgb wnl .  Fundal height still measuring low, 26.5 cm at 31w4d. Discussed plan to get NST tomorrow or Friday and repeat growth US w/ assessment of placenta ASAP.       22: 25w4d.  Weight gain adequate.  Has some pain and fullness shortly after eating.  Feels like she's getting full earlier.  Feels tight and makes it hard to breathe, but no burning.   Also has been having leaking of breast milk from right side x 1 month.  Cycle of leaking milk, sensitive nipple, healing, then milk leaking again.  She is mostly concerned that it is unilateral and this early in pregnancy.  Fundal height 23 cm.   bpm.  no edema.     10/27/22: 20w5d.  Weight gain adequate  Nipple dryness, dry skin on face, rectal pain suggestive of hemorrhoids.  Discussed supportive care for hemorrhoids, witch hazel  tucks, fiber.  Mupirocin for crusty rash.  Reviewed treating dry skin on her face.  Reordered ultrasound d/t poor visualization of outflow tracts and face.  Otherwise normal ultrasound.  Fundal height 20 cm.   bpm.  no edema.     10/05/22: 17w4d.  Weight gain  Not found. Concern today is skin itching of face.  Discussed gentle cleanser, gentle moisturizer, as-needed hydrocortisone.  Benadryl at bedtime.  This has helped in the past but makes her sleepy.  Fundal height below the umbilicus cm.   bpm.  no edema.  09/07/2022: First OB visit.   09/01/2022: Met with OB provider.  1st trimester US unremarkable.    Dipti Kirkpatrick speaks Hmong so an  was used today.    ROS:  No - Headache  No - Changes in vision  No - Chest Pain  No - Shortness of Breath  No - Nausea   No - Vomiting  No - Abdominal Pain   No - Contractions  No - UTI symptoms  No - Vaginal Discharge    No - Vaginal Bleeding   No - Loss of Fluid   No - Extremity Swelling   Present - Fetal Movement       Patient Active Problem List   Diagnosis     Language barrier, cultural differences     First pregnancy     Supervision of high risk pregnancy, antepartum     Lack of immunity to hepatitis B virus demonstrated by serologic test     Pregnancy affected by fetal growth restriction       Current Outpatient Medications   Medication     Prenatal Vit-Fe Fumarate-FA (PRENATAL VITAMINS) 28-0.8 MG TABS     valACYclovir (VALTREX) 1000 mg tablet     No current facility-administered medications for this visit.         Going to WIC? Yes     Guidance:      What to Expect: Backache, heartburn, shortness of breath, varicose veins, hemorrhoids, constipation, leg cramps, round ligament pain, vaginal discharge.    Warning Signs: Bleeding, cramping, edema, headaches, visual disturbances, fluid leakage           Objective     BP 93/61 (BP Location: Left arm, Patient Position: Sitting, Cuff Size: Adult Regular)   Pulse 72   Temp 98.1  F (36.7  C) (Oral)   Resp  16   Wt 59.8 kg (131 lb 12.8 oz)   LMP 2022 (Within Days)   SpO2 97%   BMI 27.08 kg/m      Pregravid BMI: 20.95.   No distress.  Gravid abdomen.          Results    I personally reviewed all pertinent lab results.     NON STRESS TEST 2/15/22  ---------------------------------------------------------------------------------------------------------  NST interpretation: reactive. Test duration 33 min. Baseline  bpm. Baseline variability: moderate. Accelerations: present. Decelerations: absent. Uterine activity:  present, isolated; asymptomatic        RECOMMENDATION  ---------------------------------------------------------------------------------------------------------  Thank-you for referring your patient for  surveillance in the setting of fetal growth restriction. I discussed the findings on today's ultrasound with the patient.     Continue weekly  surveillance/UA Dopplers. Growth assessment will also be completed next week. Based on her current findings I recommend delivery at  23r1z-31c7p.     Return to primary provider for continued prenatal care.     If you have questions regarding today's evaluation or if we can be of further service, please contact the [facility].     **Fetal anomalies may be present but not detected**                                                                         IMPRESSION  ---------------------------------------------------------------------------------------------------------  1. Enriquez intrauterine pregnancy at 36w4d with fetal growth restriction (EFW 6% on 23) here for  surveillance.  2. The amniotic fluid volume appeared normal.  3. The umbilical artery dopplers were within normal limits.  4. The NST was reactive and reassuring.    No results found for this or any previous visit (from the past 24 hour(s)).

## 2023-02-18 LAB
C TRACH DNA SPEC QL NAA+PROBE: NEGATIVE
N GONORRHOEA DNA SPEC QL NAA+PROBE: NEGATIVE

## 2023-02-19 LAB — GP B STREP DNA SPEC QL NAA+PROBE: POSITIVE

## 2023-02-21 PROBLEM — O99.820 GBS (GROUP B STREPTOCOCCUS CARRIER), +RV CULTURE, CURRENTLY PREGNANT: Status: ACTIVE | Noted: 2023-02-17

## 2023-02-22 ENCOUNTER — OFFICE VISIT (OUTPATIENT)
Dept: MATERNAL FETAL MEDICINE | Facility: HOSPITAL | Age: 23
End: 2023-02-22
Attending: OBSTETRICS & GYNECOLOGY
Payer: COMMERCIAL

## 2023-02-22 ENCOUNTER — ANCILLARY PROCEDURE (OUTPATIENT)
Dept: ULTRASOUND IMAGING | Facility: HOSPITAL | Age: 23
End: 2023-02-22
Attending: OBSTETRICS & GYNECOLOGY
Payer: COMMERCIAL

## 2023-02-22 DIAGNOSIS — Z03.74 FETAL GROWTH PROBLEM SUSPECTED BUT NOT FOUND: ICD-10-CM

## 2023-02-22 DIAGNOSIS — O36.5990 FETAL GROWTH RESTRICTION ANTEPARTUM: ICD-10-CM

## 2023-02-22 PROCEDURE — 76816 OB US FOLLOW-UP PER FETUS: CPT

## 2023-02-22 PROCEDURE — 59025 FETAL NON-STRESS TEST: CPT | Mod: 26 | Performed by: OBSTETRICS & GYNECOLOGY

## 2023-02-22 PROCEDURE — 76816 OB US FOLLOW-UP PER FETUS: CPT | Mod: 26 | Performed by: OBSTETRICS & GYNECOLOGY

## 2023-02-22 PROCEDURE — 99207 PR NO CHARGE LOS: CPT | Performed by: OBSTETRICS & GYNECOLOGY

## 2023-02-22 PROCEDURE — 59025 FETAL NON-STRESS TEST: CPT

## 2023-02-22 NOTE — PROGRESS NOTES
Dipti Kirkpatrick is a  at 37w4d who presents to [facility] for NST and growth ultrasound. Pt reports positive fetal movement. Pt denies bldg/lof/change in discharge, contractions, headache, vision changes, chest pain/SOB or edema. SBAR given to Dr. An, see note in Epic.     NST Performed due to Fetal growth restriction.   reviewed efm tracing. See NST/BPP Doc Flowsheet tab.

## 2023-02-22 NOTE — PROGRESS NOTES
Please see the imaging tab for details of the ultrasound performed today.    Maida An MD  Specialist in Maternal-Fetal Medicine

## 2023-02-23 PROBLEM — O36.5990 PREGNANCY AFFECTED BY FETAL GROWTH RESTRICTION: Status: RESOLVED | Noted: 2023-01-13 | Resolved: 2023-02-23

## 2023-02-24 ENCOUNTER — OFFICE VISIT (OUTPATIENT)
Dept: FAMILY MEDICINE | Facility: CLINIC | Age: 23
End: 2023-02-24
Payer: COMMERCIAL

## 2023-02-24 VITALS
TEMPERATURE: 98.2 F | WEIGHT: 132.8 LBS | DIASTOLIC BLOOD PRESSURE: 64 MMHG | BODY MASS INDEX: 26.77 KG/M2 | HEIGHT: 59 IN | RESPIRATION RATE: 12 BRPM | HEART RATE: 86 BPM | OXYGEN SATURATION: 96 % | SYSTOLIC BLOOD PRESSURE: 99 MMHG

## 2023-02-24 DIAGNOSIS — O99.820 GROUP B STREPTOCOCCAL CARRIAGE COMPLICATING PREGNANCY: ICD-10-CM

## 2023-02-24 DIAGNOSIS — O36.5990 PREGNANCY AFFECTED BY FETAL GROWTH RESTRICTION: ICD-10-CM

## 2023-02-24 DIAGNOSIS — O09.90 SUPERVISION OF HIGH RISK PREGNANCY, ANTEPARTUM: Primary | ICD-10-CM

## 2023-02-24 LAB — BACTERIA SPEC CULT: ABNORMAL

## 2023-02-24 PROCEDURE — 99207 PR PRENATAL VISIT: CPT | Mod: GC | Performed by: STUDENT IN AN ORGANIZED HEALTH CARE EDUCATION/TRAINING PROGRAM

## 2023-02-24 NOTE — PROGRESS NOTES
Assessment & Plan  Dipti Kirkpatrick is a 22 year old , at 37w6d weeks of pregnancy with ROQUE of Mar 11, 2023 by 12w5d week ultrasound. Patient's history is high risk for the following reasons:  fundal height being low for dates starting the third trimester, language barrier, IUGR in the third trimester, now resolved.     1. Supervision of high risk pregnancy, antepartum  Doing well today, no concerns.   - cephalic presentation on US 23  - got Tdap 23  - declined covid-19 vaccination  - discussed when to present to [facility] for labor (5-1-1 rule), vaginal bleeding, LOF, or decreased fetal movement    2. Pregnancy affected by fetal growth restriction  Last US showed 33% and greater than expected interval growth, no longer FGR  - no longer required serial US  - no longer needs early induction    3. Group B streptococcal carriage complicating pregnancy  Reviewed results with patient. No abx allergy. Discussed will get penicillin every 4 hours when she gets to the hospital.       Weight gain adequate: 14 kg (30 lb 12.8 oz) to date, out of recommended total of 25-35 lbs (pregravid BMI 18.5-24.9)    There are no Patient Instructions on file for this visit.    Return to clinic in 1 week.    Benita Kay Behm, MD  I precepted today with Suzi Robison MD.    For today's visit, I reviewed patient's PMH, PSH, FH, Medications, Allergies, Immunizations, and notes from most recent clinic encounter(s).  Patient's diagnosis, treatment, and care coordination is limited due to social determinants of health - Limited income, low health literacy, language barrier  I reviewed most recent labs.   I reviewed most recent imaging studies.  I reviewed notes from M specialist.   30 minutes spent on the date of the encounter doing chart review, history and exam, documentation, care coordination, and further activities per the note    Subjective  Concerns: None    ROS:  YES, occasional - Headache  No - Changes in  "vision  No - Chest Pain  No - Shortness of Breath  YES, in the evening - Nausea   No - Vomiting  No - Abdominal pain   YES, gets tightness on occasions - Contractions  No - Dysuria   No - Vaginal Discharge    No - Vaginal bleeding   No - Loss of Fluid   No - Extremity swelling   Present - Fetal movement       Going to WIC? Yes      Patient Active Problem List   Diagnosis     Language barrier, cultural differences     First pregnancy     Supervision of high risk pregnancy, antepartum     Lack of immunity to hepatitis B virus demonstrated by serologic test     GBS (group B Streptococcus carrier), +RV culture, currently pregnant       Dipti Kirkpatrick speaks Hmong so an  was used today.    Guidance:    post-partum care  GBS  signs of labor  pain control during labor    Do you need help getting a car seat? No  Do you need help getting a breast pump? No    Objective  BP 99/64 (BP Location: Right arm, Patient Position: Sitting, Cuff Size: Adult Regular)   Pulse 86   Temp 98.2  F (36.8  C) (Oral)   Resp 12   Ht 1.486 m (4' 10.5\")   Wt 60.2 kg (132 lb 12.8 oz)   LMP 05/24/2022 (Within Days)   SpO2 96%   BMI 27.28 kg/m    No distress.  Gravid abdomen.  .  Fundal height 37 cm.  no edema.  Cervix: not examined    Results  Blood type: B POS  No results found for any visits on 02/24/23.    "

## 2023-02-24 NOTE — PROGRESS NOTES
"Preceptor attestation:  Vital signs reviewed: BP 99/64 (BP Location: Right arm, Patient Position: Sitting, Cuff Size: Adult Regular)   Pulse 86   Temp 98.2  F (36.8  C) (Oral)   Resp 12   Ht 1.486 m (4' 10.5\")   Wt 60.2 kg (132 lb 12.8 oz)   LMP 05/24/2022 (Within Days)   SpO2 96%   BMI 27.28 kg/m      Patient seen, evaluated, and discussed with the resident.  I have verified the content of the note, which accurately reflects my assessment of the patient and the plan of care.    Supervising physician: Suzi Robison MD  [facility]  "

## 2023-03-03 ENCOUNTER — OFFICE VISIT (OUTPATIENT)
Dept: FAMILY MEDICINE | Facility: CLINIC | Age: 23
End: 2023-03-03
Payer: COMMERCIAL

## 2023-03-03 VITALS
WEIGHT: 135.2 LBS | SYSTOLIC BLOOD PRESSURE: 97 MMHG | OXYGEN SATURATION: 98 % | BODY MASS INDEX: 27.78 KG/M2 | TEMPERATURE: 98.2 F | RESPIRATION RATE: 18 BRPM | HEART RATE: 75 BPM | DIASTOLIC BLOOD PRESSURE: 61 MMHG

## 2023-03-03 DIAGNOSIS — O09.90 SUPERVISION OF HIGH RISK PREGNANCY, ANTEPARTUM: Primary | ICD-10-CM

## 2023-03-03 PROCEDURE — 99207 PR PRENATAL VISIT: CPT

## 2023-03-03 NOTE — NURSING NOTE
Due to patient being non-English speaking/uses sign language, an  was used for this visit. Only for face-to-face interpretation by an external agency, date and length of interpretation can be found on the scanned worksheet.     name: Yary  Agency: [employer]  Language: Gladisong   Telephone number: [phone]  Type of interpretation: Telephone, spoken

## 2023-03-03 NOTE — PROGRESS NOTES
Assessment & Plan  Dipti Kirkpatrick is a 22 year old , at 38w6d weeks of pregnancy with ROQUE of Mar 11, 2023 by 12w5d week ultrasound. Patient's history is high risk for the following reasons:  fundal height being low for dates and IUGR in the third trimester (now resolved), language barrier.      1. Supervision of high risk pregnancy, antepartum  Doing well today, no concerns. GBS positive, no allergy to penicillin. Tdap 23. Declined covid-19 vaccination. Cephalic presentation on US 23  - discussed when to present to [facility] for labor (5-1-1 rule), vaginal bleeding, LOF, or decreased fetal movement  - follow up in one week      2. Pregnancy affected by fetal growth restriction, resolved  Last US showed 33% and greater than expected interval growth, no longer FGR  - no longer required serial US  - no longer needs early induction    Weight gain adequate.    Return to clinic in 1 week.    Linda Azevedo MD PGY2  [facility]  22    I precepted today with Dr. Bower      Subjective  Concerns: Doing well today , no concerns. Has some pain around eyebrow area, self-limiting. Leg cramping when laying down at night.     ROS:  No - Headache  No - Changes in vision  No - Chest Pain  No - Shortness of Breath  YES - Nausea; sometimes when have she has a full stomach and when brushing teeth  No - Vomiting  No - Abdominal pain   No - Contractions  No - Dysuria   No - Vaginal Discharge    No - Vaginal bleeding   No - Loss of Fluid   No - Extremity swelling  Present - Fetal movement       Going to WIC? Yes    Patient Active Problem List   Diagnosis     Language barrier, cultural differences     First pregnancy     Supervision of high risk pregnancy, antepartum     Lack of immunity to hepatitis B virus demonstrated by serologic test     GBS (group B Streptococcus carrier), +RV culture, currently pregnant       Dipti Kirkpatrick speaks Hmong so an  was used today.    Guidance:   GBS  signs of labor    Do you need help getting a car seat? No - she has one  Do you need help getting a breast pump? No - she has one    Objective  LMP 05/24/2022 (Within Days)   No distress.  Gravid abdomen.  .  Fundal height 37 cm.  no edema.  Cervix: not examined, declined by patient     Results  Blood type: B POS  No results found for any visits on 03/03/23.

## 2023-03-07 NOTE — PROGRESS NOTES
Preceptor Attestation:    I discussed the patient with the resident and evaluated the patient in person. I have verified the content of the note, which accurately reflects my assessment of the patient and the plan of care.   Supervising Physician:  Fran Bower MD.

## 2023-03-10 ENCOUNTER — HOSPITAL ENCOUNTER (OUTPATIENT)
Facility: CLINIC | Age: 23
Discharge: HOME | End: 2023-03-10
Attending: FAMILY MEDICINE | Admitting: FAMILY MEDICINE
Payer: COMMERCIAL

## 2023-03-10 ENCOUNTER — OFFICE VISIT (OUTPATIENT)
Dept: FAMILY MEDICINE | Facility: CLINIC | Age: 23
End: 2023-03-10
Payer: COMMERCIAL

## 2023-03-10 VITALS
BODY MASS INDEX: 27.94 KG/M2 | HEART RATE: 77 BPM | RESPIRATION RATE: 16 BRPM | TEMPERATURE: 98.9 F | WEIGHT: 136 LBS | OXYGEN SATURATION: 99 % | SYSTOLIC BLOOD PRESSURE: 102 MMHG | DIASTOLIC BLOOD PRESSURE: 64 MMHG

## 2023-03-10 VITALS
HEIGHT: 57 IN | BODY MASS INDEX: 29.12 KG/M2 | TEMPERATURE: 98.8 F | SYSTOLIC BLOOD PRESSURE: 107 MMHG | WEIGHT: 135 LBS | RESPIRATION RATE: 16 BRPM | DIASTOLIC BLOOD PRESSURE: 69 MMHG

## 2023-03-10 DIAGNOSIS — O99.820 GROUP B STREPTOCOCCAL CARRIAGE COMPLICATING PREGNANCY: ICD-10-CM

## 2023-03-10 DIAGNOSIS — O36.5990 PREGNANCY AFFECTED BY FETAL GROWTH RESTRICTION: ICD-10-CM

## 2023-03-10 DIAGNOSIS — O09.90 SUPERVISION OF HIGH RISK PREGNANCY, ANTEPARTUM: Primary | ICD-10-CM

## 2023-03-10 LAB
ALBUMIN UR-MCNC: NEGATIVE MG/DL
APPEARANCE UR: CLEAR
BILIRUB UR QL STRIP: NEGATIVE
CLUE CELLS: ABNORMAL
COLOR UR AUTO: ABNORMAL
GLUCOSE UR STRIP-MCNC: NEGATIVE MG/DL
HGB UR QL STRIP: NEGATIVE
KETONES UR STRIP-MCNC: NEGATIVE MG/DL
LEUKOCYTE ESTERASE UR QL STRIP: NEGATIVE
MUCOUS THREADS #/AREA URNS LPF: PRESENT /LPF
NITRATE UR QL: NEGATIVE
PH UR STRIP: 7 [PH] (ref 5–7)
RBC URINE: 0 /HPF
RUPTURE OF FETAL MEMBRANES BY ROM PLUS: NEGATIVE
SP GR UR STRIP: 1.01 (ref 1–1.03)
SQUAMOUS EPITHELIAL: 6 /HPF
TRICHOMONAS, WET PREP: ABNORMAL
UROBILINOGEN UR STRIP-MCNC: <2 MG/DL
WBC URINE: 5 /HPF
WBC'S/HIGH POWER FIELD, WET PREP: ABNORMAL
YEAST, WET PREP: ABNORMAL

## 2023-03-10 PROCEDURE — 84112 EVAL AMNIOTIC FLUID PROTEIN: CPT | Performed by: STUDENT IN AN ORGANIZED HEALTH CARE EDUCATION/TRAINING PROGRAM

## 2023-03-10 PROCEDURE — 87210 SMEAR WET MOUNT SALINE/INK: CPT | Performed by: STUDENT IN AN ORGANIZED HEALTH CARE EDUCATION/TRAINING PROGRAM

## 2023-03-10 PROCEDURE — 81001 URINALYSIS AUTO W/SCOPE: CPT | Performed by: STUDENT IN AN ORGANIZED HEALTH CARE EDUCATION/TRAINING PROGRAM

## 2023-03-10 PROCEDURE — 99207 PR PRENATAL VISIT: CPT | Mod: GC | Performed by: STUDENT IN AN ORGANIZED HEALTH CARE EDUCATION/TRAINING PROGRAM

## 2023-03-10 RX ORDER — LIDOCAINE 40 MG/G
CREAM TOPICAL
Status: DISCONTINUED | OUTPATIENT
Start: 2023-03-10 | End: 2023-03-10 | Stop reason: HOSPADM

## 2023-03-10 ASSESSMENT — ACTIVITIES OF DAILY LIVING (ADL): ADLS_ACUITY_SCORE: 33

## 2023-03-10 NOTE — PROGRESS NOTES
RETURN OB VISIT  39w6d     Assessment     22 year old B POS  at 39w6d with ROQUE Mar 11, 2023 based on Patient's last menstrual period was 2022 (within days). c//w 12w3d U/S.  Pregnancy has been complicated by fundal height being low for dates starting the third trimester, language barrier, fetal weight loss <10 %ile  in the third trimester.  Pregravid BMI: 20.7.  Weight gain has been adequate      She will not need BPP or NST done elsewhere as [facility] will see her for weekly US and NST. She will continue with me for OB care.   **serial assessment of fetal growth (every 3 weeks) in addition to weekly UA Dopplers and  surveillance. Timing of delivery will depend on Doppler findings, amniotic fluid, fetal monitoring, and interval fetal growth; based on her current findings [facility] recommends delivery at [id number]-[id number].          Nic Resendez was seen today for prenatal care.  She may have had rupture of membranes.    Due to her being GBS positive, we did not check cervix today.  I recommended that she present to [facility] now for an assessment including ROM plus.  I also advised them of my recommendation to induce labor while she is there due to history of IUGR.  She would like to avoid an induction.  Discussed with OB triage nurse.   Patient and her  understand this plan and are in agreement.  The assistance of an in-person [employer]  was utilized for today's visit.   Diagnoses and all orders for this visit:    Supervision of high risk pregnancy, antepartum    Group B streptococcal carriage complicating pregnancy    Pregnancy affected by fetal growth restriction        HIGH RISK concerns:  - NonEnglish speaking  - FGR complicating pregnancy        DELIVERY and POSTPARTUM PLANNING  - FOB will be present at the delivery.  - FOB to cut umbilical cord.  - Pain control: IV pain medications, Epidural Discussed  - Plans to breastfeed: Yes  - OK with vitamin K, Hepatitis B, erythromycin  - Name of  baby's clinic: [facility]  - Name of baby's doctor:  Sol Jorge MD  - Needs Pap postpartum        Staffed with Dr. Adriana Jorge MD  PGY-3  [facility]               Subjective     Weight gain 15.1 kg (33 lb 3.2 oz): adequate.      03/10/23: She reports having had some fluid come out several times this morning that she thought was urine.  She is GBS positive.  Reviewed that cervical checks will be minimized because of this.      Dipti Kirkpatrick speaks Hmong so an  was used today.    ROS:  No - Headache  No - Changes in vision  No - Chest Pain  No - Shortness of Breath  No - Nausea   No - Vomiting  No - Abdominal Pain   No - Contractions  No - UTI symptoms  No - Vaginal Discharge    No - Vaginal Bleeding   No - Loss of Fluid   No - Extremity Swelling   Present - Fetal Movement       Patient Active Problem List   Diagnosis     Language barrier, cultural differences     First pregnancy     Supervision of high risk pregnancy, antepartum     Lack of immunity to hepatitis B virus demonstrated by serologic test     GBS (group B Streptococcus carrier), +RV culture, currently pregnant       Current Outpatient Medications   Medication     Prenatal Vit-Fe Fumarate-FA (PRENATAL VITAMINS) 28-0.8 MG TABS     valACYclovir (VALTREX) 1000 mg tablet     No current facility-administered medications for this visit.         Going to WIC? Yes     Guidance:      What to Expect: Backache, heartburn, shortness of breath, varicose veins, hemorrhoids, constipation, leg cramps, round ligament pain, vaginal discharge.    Warning Signs: Bleeding, cramping, edema, headaches, visual disturbances, fluid leakage           Objective     LMP 05/24/2022 (Within Days)     Pregravid BMI: 20.95.   No distress.  Gravid abdomen.        Results    I personally reviewed all pertinent lab results.     Results for orders placed or performed during the hospital encounter of 03/10/23   UA with Microscopic reflex  to Culture     Status: Abnormal    Specimen: Urine, Clean Catch   Result Value Ref Range    Color Urine Light Yellow Colorless, Straw, Light Yellow, Yellow    Appearance Urine Clear Clear    Glucose Urine Negative Negative mg/dL    Bilirubin Urine Negative Negative    Ketones Urine Negative Negative mg/dL    Specific Gravity Urine 1.010 1.001 - 1.030    Blood Urine Negative Negative    pH Urine 7.0 5.0 - 7.0    Protein Albumin Urine Negative Negative mg/dL    Urobilinogen Urine <2.0 <2.0 mg/dL    Nitrite Urine Negative Negative    Leukocyte Esterase Urine Negative Negative    Mucus Urine Present (A) None Seen /LPF    RBC Urine 0 <=2 /HPF    WBC Urine 5 <=5 /HPF    Squamous Epithelials Urine 6 (H) <=1 /HPF    Narrative    Urine Culture not indicated   Rupture of Fetal Membranes by ROM Plus     Status: Normal   Result Value Ref Range    Rupture of Fetal Membranes by ROM Plus Negative Negative, Invalid, Suggest Repeat    Narrative    It is recommended that the tests to detect rupture of the amniotic membranes should not be used without other clinical assessments to make clinical patient management decision.   Wet preparation     Status: Abnormal    Specimen: Vagina; Swab   Result Value Ref Range    Trichomonas Absent Absent    Yeast Absent Absent    Clue Cells Absent Absent    WBCs/high power field 2+ (A) None

## 2023-03-10 NOTE — NURSING NOTE
Due to patient being non-English speaking/uses sign language, an  was used for this visit. Only for face-to-face interpretation by an external agency, date and length of interpretation can be found on the scanned worksheet.       name:  Griselda Kirkpatrick  Agency:  [employer]  Language: Nahed  Telephone number: [phone]  Type of interpretation:  Face-to-face, spoken

## 2023-03-11 NOTE — DISCHARGE INSTRUCTIONS
Undelivered Patients Discharge Instructions: Nahed    Tus kws avery mob saib koj vim: Membrane Assessment  Peb hais kom koj: Dr Alfie Fabian tau (Kuaj los yog Tshuaj):Assessment of baby's heart beat, monitoring of contractions, and lab tests for breaking of lemons, or infection     Josh noj mov:   Drink 8 to 12 glasses of liquids (milk, juice, water) every day.  You may eat meals and snacks.     Ua zog:  Call your doctor or nurse midwife if your baby is moving less than usual.     Hu koj tus kws avery mob yog tias koj pom:  Koj lub ntsej muag o o los yog koj txhais cassi los sis txhais ceg o o ntxiv tuaj.  Josh mob diomedes farzaneh uas Tylenol (acetaminophen) tsis pab.  Koj txoj josh pom josh pauv (qhov muag plooj plooj: ua sacha koj pom love teev los yog love qhov ci ci.)  Xeev siab (mob plab) thiab ntuav.   Nce li 5 phaus los sis ntau tshaj ntawd hauv ib burnett piam.  Kub hauv siab uas tsis ploj mus.  Love yam uas qhia tias muaj mob zais zis: mob thaum koj alphonso zis, yuav tsum  zis ntau zaus thiab xav dim zis heev.  Lub zais dej tawg, los sis koj xau dej sacha koj lub ris hauv qab.  Ntshav liab ploog uas los koj lub ris hauv qab.  Mob ntawm koj plab mog los sis lub plab.  Hais txog tus me nyuam thib ib: Mob plab yuav yug me nyuam ntau tshaj txhua 5 feeb tau ib teev rov richard.  Hais txog tus me nyuam thib ob (rov richard): Mob plab yuav yug me nyuam ntau tshaj 10 feeb thiab mob zuj zus ntxiv.  Qhov uas tawm kua hauv qhov chaw mos hloov los yog loj tuaj (saib yog xim dab tsi thiab npaum li norma).    Ua ntej dhau 37 lub burnett tiam, lev li hu tuaj yog tias koj pom love yam nram qab no:  Muaj mob plab txhua kaum feeb los yog ntau caty   Tawm kua hauv qhov chaw mos  Hnov ceev ntawm cov txha ntsag  Mob nraub qaum me ntsis  Josh mob plab uas zoo li koj coj khaub ncaws  Mob plab thaum raws plab los yog thaum tsis raws plab      Lwm yam: As scheduled in the clinic

## 2023-03-11 NOTE — PROGRESS NOTES
OB Triage Note      Assessment and Plan:     Dipti Kirkpatrick is a 22 year old  at 39w6d GBS positive, history of FGR that has since resolved.  She presents to triage for rule out of rupture of membranes after reporting loss of fluid at around 09:00-10:00 today.   Patient Active Problem List   Diagnosis     Language barrier, cultural differences     First pregnancy     Supervision of high risk pregnancy, antepartum     Lack of immunity to hepatitis B virus demonstrated by serologic test     GBS (group B Streptococcus carrier), +RV culture, currently pregnant       UA, wet prep, ROM plus were negative    Patient discharged to home    Discussed with patient's that she should return if she notices any gush of fluid, vaginal bleeding, frequent contractions.    Otherwise she will follow-up with [facility] Valdez for Weekly US and NST    Patient discussed with attending physician, Dr. Jonn Judge , who agrees with the plan.      Aleisha Mcgregor MD PGY1 3/10/2023  [facility] Family Medicine Residency Program       Subjective:     Dipti Kirkpatrick is a  22 year old female at 39w6d with a current prenatal history significant for GBS positive, fetal growth restriction that has since resolved.  She presents to OB triage reporting loss of fluid at around 9 to 10 AM this morning    Dipti Kirkpatrick is a patient of Sol Valdez from [facility] clinic.     She reports irregular contractions starting when she presented to triage at around 18:40,  and occurring every 3-4 minutes. She reports fluid leakage. She denies bleeding per vagina. Fetal movement is .normal.  Estimated Date of Delivery: Mar 11, 2023 Patient's last menstrual period was 2022 (within days).   Presented to triage at    Her prenatal course has been complicated by fetal growth restriction and was followed by MIHAI.  This has since resolved per ultrasound on 2023.    Prenatal labs:   Lab Results   Component Value Date    AS Negative  "09/07/2022    HEPBANG Nonreactive 09/07/2022    CHPCRT Negative 02/17/2023    GCPCRT Negative 02/17/2023    HGB 12.0 11/30/2022          Review of Systems:   CONSTITUTIONAL: no fatigue, no unexpected change in weight  SKIN: no worrisome rashes or lesions  EYES: no acute vision problems or changes  ENT: no ear problems, no mouth problems, no throat problems  RESP: no significant cough, no shortness of breath  CV: no chest pain, no palpitations, no new or worsening peripheral edema  GI: no nausea, no vomiting, no constipation, no diarrhea  : no frequency, no dysuria, no hematuria, positive discharge, loss of fluid  NEURO: no weakness, no dizziness, positive headaches  ENDOCRINE: no temperature intolerance, no skin/hair changes  PSYCHIATRIC: NEGATIVE for changes in mood or trouble with sleep         Physical Exam:   Vitals:   /69 (BP Location: Left arm, Patient Position: Semi-Martinez's, Cuff Size: Adult Regular)   Temp 98.8  F (37.1  C) (Oral)   Resp 16   Ht 1.448 m (4' 9\")   Wt 61.2 kg (135 lb)   LMP 05/24/2022 (Within Days)   BMI 29.21 kg/m    135 lbs 0 oz  Estimated body mass index is 29.21 kg/m  as calculated from the following:    Height as of this encounter: 1.448 m (4' 9\").    Weight as of this encounter: 61.2 kg (135 lb).    GEN: Awake, alert in no apparent distress   HEENT: grossly normal  RESPIRATORY: clear to auscultation bilaterally, no increased work of breathing  BACK:  no costovertebral angle tenderness   CARDIOVASCULAR: RRR, no murmur  ABDOMEN: gravid  PELVIC:  no fluid noted, no blood noted.   EXT:  no edema or calf tenderness    NST interpretation:  Baseline rate normal 140  Accelerations present  Decelerations not present  Interpretation: reactive    Labs today:  No results found for any visits on 03/10/23.  "

## 2023-03-12 ENCOUNTER — HOSPITAL ENCOUNTER (INPATIENT)
Facility: CLINIC | Age: 23
LOS: 2 days | Discharge: HOME | End: 2023-03-14
Attending: STUDENT IN AN ORGANIZED HEALTH CARE EDUCATION/TRAINING PROGRAM | Admitting: STUDENT IN AN ORGANIZED HEALTH CARE EDUCATION/TRAINING PROGRAM
Payer: COMMERCIAL

## 2023-03-12 ENCOUNTER — HOSPITAL ENCOUNTER (OUTPATIENT)
Facility: CLINIC | Age: 23
Discharge: HOME | End: 2023-03-12
Attending: FAMILY MEDICINE | Admitting: FAMILY MEDICINE
Payer: COMMERCIAL

## 2023-03-12 PROBLEM — O47.9 UTERINE CONTRACTIONS: Status: ACTIVE | Noted: 2023-03-12

## 2023-03-12 LAB
ABO/RH(D): NORMAL
ANTIBODY SCREEN: NEGATIVE
BASOPHILS # BLD AUTO: 0 10E3/UL (ref 0–0.2)
BASOPHILS NFR BLD AUTO: 0 %
EOSINOPHIL # BLD AUTO: 0.1 10E3/UL (ref 0–0.7)
EOSINOPHIL NFR BLD AUTO: 1 %
ERYTHROCYTE [DISTWIDTH] IN BLOOD BY AUTOMATED COUNT: 13.9 % (ref 10–15)
HCT VFR BLD AUTO: 28.6 % (ref 35–47)
HGB BLD-MCNC: 10 G/DL (ref 11.7–15.7)
IMM GRANULOCYTES # BLD: 0.1 10E3/UL
IMM GRANULOCYTES NFR BLD: 1 %
LYMPHOCYTES # BLD AUTO: 2.1 10E3/UL (ref 0.8–5.3)
LYMPHOCYTES NFR BLD AUTO: 16 %
MCH RBC QN AUTO: 33 PG (ref 26.5–33)
MCHC RBC AUTO-ENTMCNC: 35 G/DL (ref 31.5–36.5)
MCV RBC AUTO: 94 FL (ref 78–100)
MONOCYTES # BLD AUTO: 1 10E3/UL (ref 0–1.3)
MONOCYTES NFR BLD AUTO: 8 %
NEUTROPHILS # BLD AUTO: 9.6 10E3/UL (ref 1.6–8.3)
NEUTROPHILS NFR BLD AUTO: 74 %
NRBC # BLD AUTO: 0 10E3/UL
NRBC BLD AUTO-RTO: 0 /100
PLATELET # BLD AUTO: 252 10E3/UL (ref 150–450)
RBC # BLD AUTO: 3.03 10E6/UL (ref 3.8–5.2)
SPECIMEN EXPIRATION DATE: NORMAL
WBC # BLD AUTO: 12.9 10E3/UL (ref 4–11)

## 2023-03-12 PROCEDURE — G0463 HOSPITAL OUTPT CLINIC VISIT: HCPCS

## 2023-03-12 PROCEDURE — 86780 TREPONEMA PALLIDUM: CPT

## 2023-03-12 PROCEDURE — 120N000001 HC R&B MED SURG/OB

## 2023-03-12 PROCEDURE — 86850 RBC ANTIBODY SCREEN: CPT

## 2023-03-12 PROCEDURE — 250N000011 HC RX IP 250 OP 636

## 2023-03-12 PROCEDURE — 99207 PR NO CHARGE LOS: CPT

## 2023-03-12 PROCEDURE — 85025 COMPLETE CBC W/AUTO DIFF WBC: CPT | Performed by: STUDENT IN AN ORGANIZED HEALTH CARE EDUCATION/TRAINING PROGRAM

## 2023-03-12 RX ORDER — ONDANSETRON 2 MG/ML
4 INJECTION INTRAMUSCULAR; INTRAVENOUS EVERY 6 HOURS PRN
Status: DISCONTINUED | OUTPATIENT
Start: 2023-03-12 | End: 2023-03-13 | Stop reason: HOSPADM

## 2023-03-12 RX ORDER — MISOPROSTOL 200 UG/1
800 TABLET ORAL
Status: DISCONTINUED | OUTPATIENT
Start: 2023-03-12 | End: 2023-03-13 | Stop reason: HOSPADM

## 2023-03-12 RX ORDER — PENICILLIN G 3000000 [IU]/50ML
3 INJECTION, SOLUTION INTRAVENOUS EVERY 4 HOURS
Status: DISCONTINUED | OUTPATIENT
Start: 2023-03-13 | End: 2023-03-13 | Stop reason: HOSPADM

## 2023-03-12 RX ORDER — OXYTOCIN/0.9 % SODIUM CHLORIDE 30/500 ML
340 PLASTIC BAG, INJECTION (ML) INTRAVENOUS CONTINUOUS PRN
Status: DISCONTINUED | OUTPATIENT
Start: 2023-03-12 | End: 2023-03-13 | Stop reason: HOSPADM

## 2023-03-12 RX ORDER — IBUPROFEN 800 MG/1
800 TABLET, FILM COATED ORAL
Status: DISCONTINUED | OUTPATIENT
Start: 2023-03-12 | End: 2023-03-13

## 2023-03-12 RX ORDER — NALOXONE HYDROCHLORIDE 0.4 MG/ML
0.2 INJECTION, SOLUTION INTRAMUSCULAR; INTRAVENOUS; SUBCUTANEOUS
Status: DISCONTINUED | OUTPATIENT
Start: 2023-03-12 | End: 2023-03-13 | Stop reason: HOSPADM

## 2023-03-12 RX ORDER — METHYLERGONOVINE MALEATE 0.2 MG/ML
200 INJECTION INTRAVENOUS
Status: DISCONTINUED | OUTPATIENT
Start: 2023-03-12 | End: 2023-03-13 | Stop reason: HOSPADM

## 2023-03-12 RX ORDER — NALOXONE HYDROCHLORIDE 0.4 MG/ML
0.4 INJECTION, SOLUTION INTRAMUSCULAR; INTRAVENOUS; SUBCUTANEOUS
Status: DISCONTINUED | OUTPATIENT
Start: 2023-03-12 | End: 2023-03-13 | Stop reason: HOSPADM

## 2023-03-12 RX ORDER — OXYTOCIN 10 [USP'U]/ML
10 INJECTION, SOLUTION INTRAMUSCULAR; INTRAVENOUS
Status: DISCONTINUED | OUTPATIENT
Start: 2023-03-12 | End: 2023-03-13 | Stop reason: HOSPADM

## 2023-03-12 RX ORDER — CITRIC ACID/SODIUM CITRATE 334-500MG
30 SOLUTION, ORAL ORAL
Status: DISCONTINUED | OUTPATIENT
Start: 2023-03-12 | End: 2023-03-13 | Stop reason: HOSPADM

## 2023-03-12 RX ORDER — KETOROLAC TROMETHAMINE 30 MG/ML
30 INJECTION, SOLUTION INTRAMUSCULAR; INTRAVENOUS
Status: DISCONTINUED | OUTPATIENT
Start: 2023-03-12 | End: 2023-03-13

## 2023-03-12 RX ORDER — MISOPROSTOL 200 UG/1
400 TABLET ORAL
Status: DISCONTINUED | OUTPATIENT
Start: 2023-03-12 | End: 2023-03-13 | Stop reason: HOSPADM

## 2023-03-12 RX ORDER — PROCHLORPERAZINE 25 MG
25 SUPPOSITORY, RECTAL RECTAL EVERY 12 HOURS PRN
Status: DISCONTINUED | OUTPATIENT
Start: 2023-03-12 | End: 2023-03-13 | Stop reason: HOSPADM

## 2023-03-12 RX ORDER — LIDOCAINE 40 MG/G
CREAM TOPICAL
Status: DISCONTINUED | OUTPATIENT
Start: 2023-03-12 | End: 2023-03-12 | Stop reason: HOSPADM

## 2023-03-12 RX ORDER — FENTANYL CITRATE 50 UG/ML
50 INJECTION, SOLUTION INTRAMUSCULAR; INTRAVENOUS EVERY 30 MIN PRN
Status: DISCONTINUED | OUTPATIENT
Start: 2023-03-12 | End: 2023-03-13 | Stop reason: HOSPADM

## 2023-03-12 RX ORDER — OXYTOCIN/0.9 % SODIUM CHLORIDE 30/500 ML
100-340 PLASTIC BAG, INJECTION (ML) INTRAVENOUS CONTINUOUS PRN
Status: DISCONTINUED | OUTPATIENT
Start: 2023-03-12 | End: 2023-03-14 | Stop reason: HOSPADM

## 2023-03-12 RX ORDER — PENICILLIN G POTASSIUM 5000000 [IU]/1
5 INJECTION, POWDER, FOR SOLUTION INTRAMUSCULAR; INTRAVENOUS ONCE
Status: COMPLETED | OUTPATIENT
Start: 2023-03-12 | End: 2023-03-12

## 2023-03-12 RX ORDER — CARBOPROST TROMETHAMINE 250 UG/ML
250 INJECTION, SOLUTION INTRAMUSCULAR
Status: DISCONTINUED | OUTPATIENT
Start: 2023-03-12 | End: 2023-03-13 | Stop reason: HOSPADM

## 2023-03-12 RX ORDER — METOCLOPRAMIDE 10 MG/1
10 TABLET ORAL EVERY 6 HOURS PRN
Status: DISCONTINUED | OUTPATIENT
Start: 2023-03-12 | End: 2023-03-13 | Stop reason: HOSPADM

## 2023-03-12 RX ORDER — OXYTOCIN 10 [USP'U]/ML
10 INJECTION, SOLUTION INTRAMUSCULAR; INTRAVENOUS
Status: DISCONTINUED | OUTPATIENT
Start: 2023-03-12 | End: 2023-03-14 | Stop reason: HOSPADM

## 2023-03-12 RX ORDER — METOCLOPRAMIDE HYDROCHLORIDE 5 MG/ML
10 INJECTION INTRAMUSCULAR; INTRAVENOUS EVERY 6 HOURS PRN
Status: DISCONTINUED | OUTPATIENT
Start: 2023-03-12 | End: 2023-03-13 | Stop reason: HOSPADM

## 2023-03-12 RX ORDER — ACETAMINOPHEN 325 MG/1
650 TABLET ORAL EVERY 4 HOURS PRN
Status: DISCONTINUED | OUTPATIENT
Start: 2023-03-12 | End: 2023-03-13 | Stop reason: HOSPADM

## 2023-03-12 RX ORDER — PROCHLORPERAZINE MALEATE 10 MG
10 TABLET ORAL EVERY 6 HOURS PRN
Status: DISCONTINUED | OUTPATIENT
Start: 2023-03-12 | End: 2023-03-13 | Stop reason: HOSPADM

## 2023-03-12 RX ORDER — ONDANSETRON 4 MG/1
4 TABLET, ORALLY DISINTEGRATING ORAL EVERY 6 HOURS PRN
Status: DISCONTINUED | OUTPATIENT
Start: 2023-03-12 | End: 2023-03-13 | Stop reason: HOSPADM

## 2023-03-12 RX ORDER — LIDOCAINE 40 MG/G
CREAM TOPICAL
Status: DISCONTINUED | OUTPATIENT
Start: 2023-03-12 | End: 2023-03-13 | Stop reason: HOSPADM

## 2023-03-12 RX ADMIN — PENICILLIN G POTASSIUM 5 MILLION UNITS: 5000000 POWDER, FOR SOLUTION INTRAMUSCULAR; INTRAPLEURAL; INTRATHECAL; INTRAVENOUS at 20:45

## 2023-03-12 ASSESSMENT — ACTIVITIES OF DAILY LIVING (ADL)
WEAR_GLASSES_OR_BLIND: NO
DIFFICULTY_EATING/SWALLOWING: NO
ADLS_ACUITY_SCORE: 35
DOING_ERRANDS_INDEPENDENTLY_DIFFICULTY: NO
FALL_HISTORY_WITHIN_LAST_SIX_MONTHS: NO
ADLS_ACUITY_SCORE: 20
CHANGE_IN_FUNCTIONAL_STATUS_SINCE_ONSET_OF_CURRENT_ILLNESS/INJURY: NO
DRESSING/BATHING_DIFFICULTY: NO
TOILETING_ISSUES: NO
CONCENTRATING,_REMEMBERING_OR_MAKING_DECISIONS_DIFFICULTY: NO
ADLS_ACUITY_SCORE: 20
WALKING_OR_CLIMBING_STAIRS_DIFFICULTY: NO

## 2023-03-12 NOTE — DISCHARGE INSTRUCTIONS
Discharge Instruction for Undelivered Patients      You were seen for: labor and spotting  We Consulted: BFM  You had (Test or Medicine):cervix check    Diet:   regular     Activity:  As tolerated    Call your provider if you notice:  Swelling in your face or increased swelling in your hands or legs.  Headaches that are not relieved by Tylenol (acetaminophen).  Changes in your vision (blurring: seeing spots or stars.)  Nausea (sick to your stomach) and vomiting (throwing up).   Weight gain of 5 pounds or more per week.  Heartburn that doesn't go away.  Signs of bladder infection: pain when you urinate (use the toilet), need to go more often and more urgently.  The bag of lemons (rupture of membranes) breaks, or you notice leaking in your underwear.  Bright red blood in your underwear.  Abdominal (lower belly) or stomach pain.  For first baby: Contractions (tightening) less than 5 minutes apart for one hour or more.      Follow-up:  Keep appointment in clinic          Please, return to the L&D in [facility] if you started having severe and regular contraction, gush of fluid per vagina or vaginal bleeding.  Increase your fluid intake

## 2023-03-12 NOTE — PROGRESS NOTES
"Pt to [facility] with complaints of some spotting earlier today. She states she had a small amount of spotting but did not feel like she needed to wear a pad for it. Pt states she feels some \"tightening\" but it is not painful.  "

## 2023-03-12 NOTE — PROGRESS NOTES
OB Triage Note        Assessment and Plan:     Dipti Kirkpatrick is a 22 year old  at 40w1d not in labor.   Patient Active Problem List   Diagnosis     Language barrier, cultural differences     First pregnancy     Supervision of high risk pregnancy, antepartum     Lack of immunity to hepatitis B virus demonstrated by serologic test     GBS (group B Streptococcus carrier), +RV culture, currently pregnant      Observation  Not on labor   Discharge home  Return if contraction became severe or having gush of fluid or more spotting    Patient discussed with attending physician, Dr. Jonn Judge , who agrees with the plan.      Brunilda Ambrose MD PGY3 3/12/2023  [facility] Family Medicine Residency Program       Subjective:     Dipti Kirkpatrick is a  22 year old female at 40w1d with a current prenatal history significant for GBS positive who presents to OB triage with one-time spotting and contractions that started today around 1:00 PM.  The contraction is not severe or making the patient uncomfortable.  Recent negative UA and wet prep  Dipti Kirkpatrick is a patient of Sol Valdez from [facility].     She reports regular contractions starting at 1 pm, and occurring every 5-6 minutes. She denies fluid leakage. She reports 1 time small spotting per vagina. Fetal movement is .normal.  Estimated Date of Delivery: Mar 11, 2023 Patient's last menstrual period was 2022 (within days).       Her prenatal course has been uncomplicated  .    Prenatal labs:   Lab Results   Component Value Date    AS Negative 2022    HEPBANG Nonreactive 2022    CHPCRT Negative 2023    GCPCRT Negative 2023    HGB 12.0 2022          Review of Systems:   CONSTITUTIONAL: no fatigue, no unexpected change in weight  SKIN: no worrisome rashes or lesions  EYES: no acute vision problems or changes  ENT: no ear problems, no mouth problems, no throat problems  RESP: no significant cough, no  "shortness of breath  CV: no chest pain, no palpitations, no new or worsening peripheral edema  GI: no nausea, no vomiting, no constipation, no diarrhea  : no frequency, no dysuria, no hematuria  NEURO: no weakness, no dizziness, no headaches  ENDOCRINE: no temperature intolerance, no skin/hair changes  PSYCHIATRIC: NEGATIVE for changes in mood or trouble with sleep         Physical Exam:   Vitals:   LMP 05/24/2022 (Within Days)   0 lbs 0 oz  Estimated body mass index is 29.21 kg/m  as calculated from the following:    Height as of 3/10/23: 1.448 m (4' 9\").    Weight as of 3/10/23: 61.2 kg (135 lb).    GEN: Awake, alert in no apparent distress   HEENT: grossly normal  NECK: no lymphadenopathy or thryoidomegaly  RESPIRATORY: clear to auscultation bilaterally, no increased work of breathing  BACK:  no costovertebral angle tenderness   CARDIOVASCULAR: RRR, no murmur  ABDOMEN: gravid.  vertex by Leopold's  PELVIC:  no fluid noted, no blood noted.  Presenting part: cephalic.  Cervix: 3/80/-1  EXT:  no edema or calf tenderness    NST interpretation:  Baseline rate 125 normal  Accelerations present  Decelerations not present  Interpretation: reactive    Labs today:  No results found for any visits on 03/12/23.    "

## 2023-03-13 LAB — T PALLIDUM AB SER QL: NONREACTIVE

## 2023-03-13 PROCEDURE — 250N000013 HC RX MED GY IP 250 OP 250 PS 637

## 2023-03-13 PROCEDURE — 0KQM0ZZ REPAIR PERINEUM MUSCLE, OPEN APPROACH: ICD-10-PCS | Performed by: STUDENT IN AN ORGANIZED HEALTH CARE EDUCATION/TRAINING PROGRAM

## 2023-03-13 PROCEDURE — 10907ZC DRAINAGE OF AMNIOTIC FLUID, THERAPEUTIC FROM PRODUCTS OF CONCEPTION, VIA NATURAL OR ARTIFICIAL OPENING: ICD-10-PCS | Performed by: STUDENT IN AN ORGANIZED HEALTH CARE EDUCATION/TRAINING PROGRAM

## 2023-03-13 PROCEDURE — 59400 OBSTETRICAL CARE: CPT | Mod: GC

## 2023-03-13 PROCEDURE — 120N000001 HC R&B MED SURG/OB

## 2023-03-13 PROCEDURE — 250N000011 HC RX IP 250 OP 636

## 2023-03-13 PROCEDURE — 722N000001 HC LABOR CARE VAGINAL DELIVERY SINGLE

## 2023-03-13 RX ORDER — OXYTOCIN 10 [USP'U]/ML
10 INJECTION, SOLUTION INTRAMUSCULAR; INTRAVENOUS
Status: DISCONTINUED | OUTPATIENT
Start: 2023-03-13 | End: 2023-03-14 | Stop reason: HOSPADM

## 2023-03-13 RX ORDER — BISACODYL 10 MG
10 SUPPOSITORY, RECTAL RECTAL DAILY PRN
Status: DISCONTINUED | OUTPATIENT
Start: 2023-03-13 | End: 2023-03-14 | Stop reason: HOSPADM

## 2023-03-13 RX ORDER — MODIFIED LANOLIN
OINTMENT (GRAM) TOPICAL
Status: DISCONTINUED | OUTPATIENT
Start: 2023-03-13 | End: 2023-03-14 | Stop reason: HOSPADM

## 2023-03-13 RX ORDER — ACETAMINOPHEN 325 MG/1
650 TABLET ORAL EVERY 4 HOURS PRN
Status: DISCONTINUED | OUTPATIENT
Start: 2023-03-13 | End: 2023-03-14 | Stop reason: HOSPADM

## 2023-03-13 RX ORDER — MISOPROSTOL 200 UG/1
800 TABLET ORAL
Status: DISCONTINUED | OUTPATIENT
Start: 2023-03-13 | End: 2023-03-14 | Stop reason: HOSPADM

## 2023-03-13 RX ORDER — IBUPROFEN 800 MG/1
800 TABLET, FILM COATED ORAL EVERY 6 HOURS PRN
Status: DISCONTINUED | OUTPATIENT
Start: 2023-03-13 | End: 2023-03-14 | Stop reason: HOSPADM

## 2023-03-13 RX ORDER — CARBOPROST TROMETHAMINE 250 UG/ML
250 INJECTION, SOLUTION INTRAMUSCULAR
Status: DISCONTINUED | OUTPATIENT
Start: 2023-03-13 | End: 2023-03-14 | Stop reason: HOSPADM

## 2023-03-13 RX ORDER — DOCUSATE SODIUM 100 MG/1
100 CAPSULE, LIQUID FILLED ORAL DAILY
Status: DISCONTINUED | OUTPATIENT
Start: 2023-03-13 | End: 2023-03-14 | Stop reason: HOSPADM

## 2023-03-13 RX ORDER — HYDROCORTISONE 25 MG/G
CREAM TOPICAL 3 TIMES DAILY PRN
Status: DISCONTINUED | OUTPATIENT
Start: 2023-03-13 | End: 2023-03-14 | Stop reason: HOSPADM

## 2023-03-13 RX ORDER — OXYTOCIN/0.9 % SODIUM CHLORIDE 30/500 ML
340 PLASTIC BAG, INJECTION (ML) INTRAVENOUS CONTINUOUS PRN
Status: DISCONTINUED | OUTPATIENT
Start: 2023-03-13 | End: 2023-03-14 | Stop reason: HOSPADM

## 2023-03-13 RX ORDER — MISOPROSTOL 200 UG/1
400 TABLET ORAL
Status: DISCONTINUED | OUTPATIENT
Start: 2023-03-13 | End: 2023-03-14 | Stop reason: HOSPADM

## 2023-03-13 RX ORDER — METHYLERGONOVINE MALEATE 0.2 MG/ML
200 INJECTION INTRAVENOUS
Status: DISCONTINUED | OUTPATIENT
Start: 2023-03-13 | End: 2023-03-14 | Stop reason: HOSPADM

## 2023-03-13 RX ADMIN — KETOROLAC TROMETHAMINE 30 MG: 30 INJECTION, SOLUTION INTRAMUSCULAR; INTRAVENOUS at 03:22

## 2023-03-13 RX ADMIN — IBUPROFEN 800 MG: 800 TABLET ORAL at 09:49

## 2023-03-13 RX ADMIN — PENICILLIN G 3 MILLION UNITS: 3000000 INJECTION, SOLUTION INTRAVENOUS at 00:46

## 2023-03-13 RX ADMIN — DOCUSATE SODIUM 100 MG: 100 CAPSULE, LIQUID FILLED ORAL at 09:49

## 2023-03-13 ASSESSMENT — ACTIVITIES OF DAILY LIVING (ADL)
ADLS_ACUITY_SCORE: 20

## 2023-03-13 NOTE — PROGRESS NOTES
POST-PARTUM PROGRESS NOTE    Assessment and Plan: Patient is a 22 year old year old  admitted on 3/12/2023. She delivered a healthy infant via . Patient is doing well overall and has no concerns.   1. Postpartum day 0  2. Normal postpartum course.  3. Continue to encourage and assist her with breastfeeding and routine  cares  4. Continue ibuprofen for pain control  5. Encourage ambulation  6. Anticipate discharge 3/14/23      Patient discussed with attending physician, Dr. Bekah Gillis , who agrees with the plan.    Brunilda Ambrose MD PGY3 3/13/2023  [facility] Family Medicine Residency Program    Subjective:  The patient feels well. She is bonding with the infant. Voiding without difficulty, lochia normal, tolerating normal diet, and passing flatus.  Pain is well controlled with current medications.  The patient has no emotional concerns to report at this time. The baby is well and being fed by breast.    Objective:  /74 (BP Location: Left arm, Patient Position: Semi-Martinez's, Cuff Size: Adult Regular)   Pulse 88   Temp 98.7  F (37.1  C) (Oral)   Resp 16   LMP 2022 (Within Days)   Breastfeeding Unknown   GEN: NAD  CV: RRR, no murmurs  PULM: CTAB  ABD: Fundus firm and below umbilicus, NTTP  EXT: Warm, dry and well perfused. No edema. Calves NTTP and equal.     Prenatal Labs:   Lab Results   Component Value Date    AS Negative 2023    HEPBANG Nonreactive 2022    CHPCRT Negative 2023    GCPCRT Negative 2023    HGB 10.0 (L) 2023     Hemoglobin   Date Value Ref Range Status   2023 10.0 (L) 11.7 - 15.7 g/dL Final   2022 12.0 11.7 - 15.7 g/dL Final            Immunization History   Administered Date(s) Administered     COVID-19 Vaccine (Riaz) 2021     COVID-19 Vaccine 12+ (Pfizer ) 2022     HepB-Adult 10/05/2022, 2023     Influenza Vaccine >6 months (Alfuria,Fluzone) 10/05/2022     Tdap  (Adacel,Boostrix) 01/11/2023

## 2023-03-13 NOTE — PROGRESS NOTES
[facility] Labor and Delivery Progress Note    Dipti Kirkpatrick MRN# [id number]   Age: 22 year old YOB: 2000           Subjective:   Painful contractions             Objective:   Patient Vitals for the past 24 hrs:   BP Temp Temp src Pulse Resp Oximeter Heart Rate   23 0030 115/72 -- -- -- -- 81 bpm   23 120/78 99  F (37.2  C) Oral 81 16 81 bpm         Cervical Exam: 9-10 / 100% / -1      Position: Mid    Membranes: Bulging     Fetal Heart Rate:    Monitor: external US    Variability: moderate (amplitude range 6 to 25 bpm)    Baseline Rate: normal range    Fetal Heart Rate Tracing: cat 1          Assessment:   Dipti Kirkpatrick is a 22 year old  who is 40w2d here in active labor.    S/p AROM at 0130, minimal fluid, minimal mec  Completed 4+ hrs penicillin for GBS+          Plan:   Continue expectant management   Cont prophylactic antibiotic for + GBS status  Anticipate       Staffed with Dr. Elias Jorge MD

## 2023-03-13 NOTE — L&D DELIVERY NOTE
[facility] Delivery Summary  [facility]  Date of Service: 3/13/2023    Name      Dipti Kirkpatrick         2000  MRN       [id number]  PCP        Sol Jorge     DELIVERY NARRATIVE    On 3/13/2023 Dipti Kirkpatrick delivered a viable female infant at 40w2d with apgars of 8 and 9 via Vaginal, Spontaneous Delivery.    Dipti presented to [facility]aternity Care on 3/12/2023 with regular contractions. Her group B Strep (GBS) carrier status was positive. She received 2 intrapartum doses of IV penicillin. She received an AROM for augmentation.    Delivery was via vaginal, spontaneous  to a sterile field under no anesthesia. Infant delivered in vertex  right  occiput  anterior  position. Shoulders delivered without difficulty. The baby was placed on the patient's abdomen.  Cord complications: none . Delayed cord clamping was performed.  The cord was doubly clamped and cut 3 vessels were noted.     Placenta delivered at 3/13/2023  2:29 AM . Placental disposition was Hospital disposal . Fundal massage performed and fundus found to be  firm. The following uterotonics were given: Pitocin (IV). Perineum, vagina, cervix were inspected, and the following lacerations were noted: 2nd degree perineal. Lacerations were repaired in the usual fashion using 3-0 Vicryl. QBL: 200 mL.    Excellent hemostasis was noted. Needle and sponge count correct. Infant and patient in delivery room in good and stable condition.   _________________    GA: 40w2d  GP:   Labor Complications: None   Additional Complications:    QBL: 200 mL  Delivery Type: Vaginal, Spontaneous   Duration of Ruptured Membranes: 0h 56m  GBS Status:   Group B Strep PCR   Date Value Ref Range Status   2023 Positive (A) Negative Final     Comment:     ALERT: Streptococcus agalactiae (Group B Streptococcus) has a high rate of resistance to clindamycin. Therefore, clindamycin is not recommended for treatment unless  susceptibility testing has been performed.       Weight: 3.12 kg (6 lb 14.1 oz)   Apgar scores: 8 , 9         Christian Kirkpatrick [[id number]]    Labor Event Times    Labor onset date: 3/12/23 Onset time: 12:00 PM   Dilation complete date: 3/13/23 Complete time:  1:55 AM   Start pushing date/time: 3/13/2023 0205      Labor Events     labor?: No   steroids: None  Labor Type: Spontaneous  Predominate monitoring during 1st stage: intermittent electronic fetal monitoring     Antibiotics received during labor?: Yes  Reason for Antibiotics: GBS  Antibiotics received for GBS: Penicillin  Antibiotics Given (GBS): Greater than 4 hours prior to delivery     Rupture identifier: Sac 1  Rupture date/time: 3/13/23 0126   Rupture type: Artificial Rupture of Membranes  Fluid color: Clear  Fluid odor: Normal     Augmentation: AROM  1:1 continuous labor support provided by?: RN       Delivery/Placenta Date and Time    Delivery Date: 3/13/23 Delivery Time:  2:22 AM   Placenta Date/Time: 3/13/2023  2:29 AM  Oxytocin given at the time of delivery: after delivery of baby  Delivering clinician: Niharika Griffin MD   Other personnel present at delivery:  Provider Role   Danielle Godwin MD Dmytrenko, Anya, MD Baker, Pamela, RN Volovsek, Ariana M RN          Vaginal Counts     Initial count performed by 2 team members:  Two Team Members   lashell Tidwell Dr       Mather Suture Needles Sponges (RETIRED) Instruments   Initial counts 0 0 0    Added to count 2 1 5    Relief counts       Final counts             Placed during labor Accounted for at the end of labor   FSE No NA   IUPC No NA   Cervidil No NA              Final count performed by 2 team members:  Two Team Members   Dr Elias Tidwell RN      Final count correct?: Yes     Apgars    Living status: Living   1 Minute 5 Minute 10 Minute 15 Minute 20 Minute   Skin color: 1  1       Heart rate: 2  2       Reflex irritability: 2  2       Muscle  "tone: 2  2       Respiratory effort: 1  2       Total: 8  9       Apgars assigned by: ISABELA ROOT     Cord    Vessels: 3 Vessels    Cord Complications: None               Cord Blood Disposition: Discard    Gases Sent?: No    Delayed cord clamping?: Yes    Cord Clamping Delay (seconds):  seconds    Stem cell collection?: No        Resuscitation    Methods: None  Output in Delivery Room: Stool     [place] Measurements    Weight: 6 lb 14.1 oz Length: 1' 7.25\"   Head circumference: 12.5 cm    Output in delivery room: Stool     Skin to Skin and Feeding Plan    Skin to skin initiation date/time: 1/3/1841    Skin to skin with: Mother  Skin to skin end date/time:        Labor Events and Shoulder Dystocia    Fetal Tracing Prior to Delivery: Category 2  Shoulder dystocia present?: Neg     Delivery (Maternal) (Provider to Complete) (186000)    Episiotomy: None  Perineal lacerations: 2nd    Repair suture: 3-0 Vicryl  Number of repair packets: 1  Genital tract inspection done: Pos     Blood Loss  Mother: Dipti Kirkpatrick #[id number]   Start of Mother's Information    Delivery Blood Loss  23 1200 - 23 0344    Delivery QBL (mL) Hospital Encounter 200 mL    Total  200 mL         End of Mother's Information  Mother: Dipti Kirkpatrick #[id number]          Delivery - Provider to Complete ([id number])    Delivering clinician: Niharika Griffin MD  Attempted Delivery Types (Choose all that apply): Spontaneous Vaginal Delivery  Delivery Type (Choose the 1 that will go to the Birth History): Vaginal, Spontaneous                   Other personnel:  Provider Role   Danielle Godwin MD Dmytrenko, Anya, MD Baker, Pamela, RN Volovsek, Ariana M, RN                 Placenta    Date/Time: 3/13/2023  2:29 AM  Removal: Spontaneous  Disposition: Hospital disposal           Anesthesia    Method: None                Presentation and Position    Presentation: Vertex    Position: Right Occiput Anterior               Delivery was " supervised by attending physician Dr. Niharika Godwin MD PGY1 3/13/2023  [facility] Family Medicine Residency Program

## 2023-03-13 NOTE — PLAN OF CARE
Problem: Postpartum (Vaginal Delivery)  Goal: Absence of Infection Signs and Symptoms  Outcome: Progressing     Problem: Postpartum (Vaginal Delivery)  Goal: Optimal Pain Control and Function  Outcome: Progressing

## 2023-03-13 NOTE — PROGRESS NOTES
2015 - Assumed care of 23 yo, lashawn Resendez  @ 40+1 WGA.  [facility] in active labor with  at side.  Hmong speaking -  needed.  Phone  acquired.  Discussed her history and labor story, oriented to L&D room, labor plan, and cervical check to determine immediate interventions.  Pregnancy remarkable for Pos GBS, HEP B non-immune, and previous dx of IUGR.  EMX2 applied. FHR cat 1.  Assessment complete and WNL.  IV placed without difficulty into left forearm.    2100 - Pt up and off monitors    2130 - Returned to bed, first dose of PCN 5MU complete.  EMX2 reapplied. Pt states no change to her uterine activity at this time.

## 2023-03-13 NOTE — LACTATION NOTE
This note was copied from a baby's chart.  Bedside Rn also IBCLC and supporting/education Pakou and spouse throughout this shift. Father used as  per patient request during breastfeeding sessions.     Oral assessment demonstrates strong suck, normal palate, equal lateralization and cupping. Infant able to achieve good latches in football, laid back and sidelying. Education on positioning nose to nipple and chin led latch, but parents still needing reminders. Infant, Riya, latches easily but without support of nipple elevation and sandwiches, Riya achieves a shallow, painful latch. Dipti able to visual creased nipple after feeding. Frequent swallows heard especially on the L breast. These were pointed out to parents.     Return demonstration of hand expression with large drops of colostrum. Dipti frequently expresses concern infant is not getting enough at the breast. Educated on colostrum and signs of satiety/milk transfer.     Started education on breastfeeding essentials binder and educated on use of feeding.     IBCLC will follow up tomorrow.

## 2023-03-13 NOTE — PLAN OF CARE
Problem: Postpartum (Vaginal Delivery)  Goal: Effective Urinary Elimination  Outcome: Progressing    Voiding without difficulty or pain.     Problem: Postpartum (Vaginal Delivery)  Goal: Hemostasis  Outcome: Progressing     Fundus is firm and midline with light bleeding.

## 2023-03-13 NOTE — H&P
OBSTETRICS ADMISSION HISTORY & PHYSICAL  DATE OF ADMISSION: 3/12/2023  7:51 PM        Assessment and Plan:   Assessment:   Dipti Kirkpatrick is a 22 year old  at 40w1d in active labor. Membranes are intact. GBS positive. Prenatal care complicated by IUGR in third trimester now resolved.    Patient Active Problem List   Diagnosis     Language barrier, cultural differences     First pregnancy     Supervision of high risk pregnancy, antepartum     Lack of immunity to hepatitis B virus demonstrated by serologic test     GBS (group B Streptococcus carrier), +RV culture, currently pregnant     Uterine contractions         PLAN:   1. Admit - see IP orders  2. Anticipate   3. GBS: positive. Antibiotics are indicated   4. Comfort plan: no pain meds desired  5. Will monitor labor progress along with RN and update attending physician  5.   Will notify Dr Sol Jorge and supervising physician Dr. Griffin.    Patient discussed with attending physician, Dr. Niharika Griffin, who agrees with the plan.     Danielle Godwin MD PGY1 3/12/2023  [facility]idency [facility]       Chief Complaint:     Contractions       History of Present Illness:     Dipti Kirkpatrick is a 22 year old year old  at 40w1d confirmed by first trimester ultrasound. Patient received prenatal care with Sol Valdez at [facility].      Presents to the [facility] for active labor management.  They report regular contractions starting at 1pm, and occurring every 5-6 minutes, now increased to frequency to every 4-5 minutes. They deny fluid leakage. They report bloody show per vagina. Fetal movement is normal and pain free.    Patient was seen in triage earlier today for regular contractions occurring every 5-6 minutes in addition to bloody show. Patient at that time was 3/80/-1 on SVE.    Their prenatal course has been complicated by IUGR in the third trimester now resolved with last EFW 33% at 37 weeks.  Patient was seen by MFM with most recent recommendation to return to primary provider with ultrasounds as clinically indicated after 37 week ultrasound as growth parameters and EFW were consistent with GA. She also is hepatitis B non immune s/p 2 doses hepatitis B vaccine in 3 dose series.    Prenatal labs   Lab Results   Component Value Date    AS Negative 2022    HEPBANG Nonreactive 2022    CHPCRT Negative 2023    GCPCRT Negative 2023    HGB 12.0 2022     GBS was collected on 23.  Weight gain during pregnancy: 15.4 kg (34 lb)         Obstetrical History:     OB History    Para Term  AB Living   1 0 0 0 0 0   SAB IAB Ectopic Multiple Live Births   0 0 0 0 0      # Outcome Date GA Lbr Julio C/2nd Weight Sex Delivery Anes PTL Lv   1 Current                     Immunzations:     Most Recent Immunizations   Administered Date(s) Administered     COVID-19 Vaccine (Riaz) 2021     COVID-19 Vaccine 12+ (Pfizer ) 2022     HepB-Adult 2023     Influenza Vaccine >6 months (Alfuria,Fluzone) 10/05/2022     Tdap (Adacel,Boostrix) 2023     Tdap this pregnancy?  YES - Date: 23  Flu shot this pregnancy?YES - Date: 10/5/22  COVID vaccine? YES - Date: 21, 22         Past Medical History:     Past Medical History:   Diagnosis Date     Known health problems: none             Past Surgical History:     Past Surgical History:   Procedure Laterality Date     NO HISTORY OF SURGERY              Family History:   No family history on file.         Social History:   no tobacco use  no alcohol use  no illicit drug use         Medications:   No current facility-administered medications on file prior to encounter.  Prenatal Vit-Fe Fumarate-FA (PRENATAL VITAMINS) 28-0.8 MG TABS, Take 1 tablet by mouth daily           Allergies:   Other food allergy         Review of Systems:   CONSTITUTIONAL: no fatigue, no unexpected change in weight  SKIN: no worrisome  "rashes or lesions  EYES: no acute vision problems or changes  ENT: no ear problems, no mouth problems, no throat problems  RESP: no significant cough, no shortness of breath  CV: no chest pain, no palpitations, no new or worsening peripheral edema  GI: no nausea, no vomiting, no constipation, no diarrhea  : no frequency, no dysuria, no hematuria  NEURO: no weakness, no dizziness, no headaches  ENDOCRINE: no temperature intolerance, no skin/hair changes  PSYCHIATRIC: NEGATIVE for changes in mood or trouble with sleep         Physical Exam:   Vitals:   LMP 05/24/2022 (Within Days)   0 lbs 0 oz  Estimated body mass index is 29.21 kg/m  as calculated from the following:    Height as of 3/10/23: 1.448 m (4' 9\").    Weight as of 3/10/23: 61.2 kg (135 lb).    GEN: Awake, alert in no apparent distress   HEENT: grossly normal  NECK: no lymphadenopathy or thryoidomegaly  RESPIRATORY: no increased work of breathing  CARDIOVASCULAR: RRR, no murmur  ABDOMEN: gravid,  vertex by Leopold's  PELVIC:  no fluid noted, bloody show noted,  Presenting part: head  Cervix: 6/90/-1  EXT:  no edema or calf tenderness  Confirmed VTX by Ultrasound? No    Electronic Fetal Monitoring:  Baseline rate normal  Variability moderate  Accelerations present  Decelerations not present     Assessment: Category I EFM interpretation suggests absence of concern for fetal metabolic acidemia at this time due to moderate variability and accelerations present    Uterine Activity normal.    Strip reviewed on unit    "

## 2023-03-13 NOTE — PROGRESS NOTES
0400 - Pt ambulated to BR with assist of one and steady gait.  Voided spontaneously 400 ml's bloody urine, small clot in hat noted.  Pericare taught and performed.  PP mattress pad placed and linens changed.      0430 - IV saline locked. [place] upright and eating.  Denies any pain at this time.

## 2023-03-13 NOTE — PROGRESS NOTES
Labor Progress Note    Assessment/Plan  22 year old  at 40w1d gestational age admitted in active labor. Membranes are intact. GBS positive. Prenatal care complicated by IUGR in third trimester now resolved. Avery every 1-4 minutes. Intermittent FHT reassuring.    - Continue penicillin for GBS positive status, s/p 5 million units, started at   - SVE with change in patient status  - Continue to monitor FHR  - Anticipate      Subjective  No changes in location, severity or frequency of contraction pain. Denies headache or changes in vision. No loss of fluid. Continues to have some bloody show. Has been intermittently standing while receiving penicillin.    Objective  Vital signs in last 24 hours  Temp:  [99  F (37.2  C)] 99  F (37.2  C)  Pulse:  [81] 81  Resp:  [16] 16  BP: (120)/(78) 120/78      Physical Exam  General: Standing at bedside leaning over bedside table  Abd: Gravid, soft, nontender  Cervix: not evaluated  FHR: Baseline 135/Mod variability/+ accels/No decels; Category I tracing  [place]: Contractions Q 1-4 min  Extremities: No peripheral edema    Pertinent Labs   Lab Results   Component Value Date    ABORH B POS 2023    HGB 10.0 2023        Patient discussed with attending physician, Dr. Niharika Griffin, who agrees with the plan.     Danielle Godwin MD PGY1 3/12/2023  [facility] Family Medicine Residency Program

## 2023-03-13 NOTE — PROGRESS NOTES
0126 - SVE per MD.  AL.  AROM small amounts of clear fluid    0155 - SVE per writer.  C/C+2    0222 - D: Pt had spontaneous vaginal delivery of a viable baby girl with Dr Griffin and residents in attendance, over 2nd degree lac to perineum.   Baby placed on mom's abdomen, bulb suctioned and stimulated. APGARS 8 and 9. Oxytocin administered at a bolus rate per VO of Dr Griffin.  Placenta spontaneous @ 0229 . See delivery details and  assessment.       A: With parent's consent, infant taken to warmer for exam, weighing, measuring and initial meds per protocol and pt desires. Discuss maternal recovery assessments, frequency of VS monitoring, fundal checks with lochia observation and normal  transitioning following delivery. Pt and FOB encouraged to do skin to skin contact with infant. Physiologic benefits discussed. When stable, will transfer to postpartum cares and initiate infant security measures.    See  delivery record and infant flowsheet.    R: Pt verbalizes understanding and agrees with plan.

## 2023-03-14 VITALS
SYSTOLIC BLOOD PRESSURE: 95 MMHG | OXYGEN SATURATION: 98 % | RESPIRATION RATE: 18 BRPM | HEART RATE: 74 BPM | DIASTOLIC BLOOD PRESSURE: 63 MMHG | TEMPERATURE: 98.2 F

## 2023-03-14 PROCEDURE — 250N000013 HC RX MED GY IP 250 OP 250 PS 637

## 2023-03-14 PROCEDURE — 99207 PR NO CHARGE LOS: CPT | Mod: GC

## 2023-03-14 RX ORDER — MODIFIED LANOLIN
OINTMENT (GRAM) TOPICAL
Qty: 7 G | Refills: 0 | Status: SHIPPED | OUTPATIENT
Start: 2023-03-14 | End: 2023-09-21

## 2023-03-14 RX ORDER — ACETAMINOPHEN 325 MG/1
650 TABLET ORAL EVERY 4 HOURS PRN
Qty: 180 TABLET | Refills: 0 | Status: ON HOLD | OUTPATIENT
Start: 2023-03-14 | End: 2024-05-10

## 2023-03-14 RX ORDER — POLYETHYLENE GLYCOL 3350 17 G/17G
1 POWDER, FOR SOLUTION ORAL DAILY
Qty: 578 G | Refills: 0 | Status: SHIPPED | OUTPATIENT
Start: 2023-03-14 | End: 2023-09-21

## 2023-03-14 RX ORDER — IBUPROFEN 800 MG/1
800 TABLET, FILM COATED ORAL EVERY 6 HOURS PRN
Qty: 90 TABLET | Refills: 0 | Status: SHIPPED | OUTPATIENT
Start: 2023-03-14 | End: 2023-11-13

## 2023-03-14 RX ADMIN — DOCUSATE SODIUM 100 MG: 100 CAPSULE, LIQUID FILLED ORAL at 09:39

## 2023-03-14 RX ADMIN — IBUPROFEN 800 MG: 800 TABLET ORAL at 03:18

## 2023-03-14 RX ADMIN — ACETAMINOPHEN 650 MG: 325 TABLET ORAL at 09:39

## 2023-03-14 RX ADMIN — BENZOCAINE AND LEVOMENTHOL: 200; 5 SPRAY TOPICAL at 14:48

## 2023-03-14 ASSESSMENT — ACTIVITIES OF DAILY LIVING (ADL)
ADLS_ACUITY_SCORE: 20

## 2023-03-14 NOTE — PLAN OF CARE
Problem: Postpartum (Vaginal Delivery)  Goal: Successful Maternal Role Transition  Outcome: Progressing  Goal: Hemostasis  Outcome: Progressing  Goal: Absence of Infection Signs and Symptoms  Outcome: Progressing  Goal: Anesthesia/Sedation Recovery  Outcome: Progressing  Goal: Optimal Pain Control and Function  Outcome: Progressing  Goal: Effective Urinary Elimination  Outcome: Progressing     Vital signs are stable. Fundus firm and midline. Lochia wnl. Patient independent with  feedings.   Daniella Mcgee RN on 3/14/2023 at 3:28 AM

## 2023-03-14 NOTE — DISCHARGE SUMMARY
Post-partum Discharge Summary    Dipti Kirkpatrick MRN# [id number]   Age: 22 year old YOB: 2000     Date of Admission:  3/12/2023  Date of Discharge::  3/14/2023  Admitting Physician:  Niharika Griffin MD  Discharge Physician:  Jef Pedraza DO  Attending Physician: Bekah Gillis MD     Home clinic: [facility]. PCP: Sol Jorge MD            Admission Diagnoses:   Uterine contractions [O47.9]          Discharge Diagnosis:     Normal spontaneous vaginal delivery  Intrauterine pregnancy at 40 weeks gestation  Patient Active Problem List   Diagnosis     Language barrier, cultural differences     First pregnancy     Supervision of high risk pregnancy, antepartum     Lack of immunity to hepatitis B virus demonstrated by serologic test     GBS (group B Streptococcus carrier), +RV culture, currently pregnant     Uterine contractions             Procedures:     Procedure(s): Repair of second degree perineal laceration       No other procedures performed during this admission           Medications Prior to Admission:     Medications Prior to Admission   Medication Sig Dispense Refill Last Dose     Prenatal Vit-Fe Fumarate-FA (PRENATAL VITAMINS) 28-0.8 MG TABS Take 1 tablet by mouth daily 90 tablet 3              Discharge Medications:     Current Discharge Medication List      CONTINUE these medications which have NOT CHANGED    Details   Prenatal Vit-Fe Fumarate-FA (PRENATAL VITAMINS) 28-0.8 MG TABS Take 1 tablet by mouth daily  Qty: 90 tablet, Refills: 3    Associated Diagnoses: Pregnancy test positive                   Consultations:   No consultations were requested during this admission          Brief History of Labor:   On 2023 at 0222, this 22 year old year old  under No analgesia delivered a viable Female infant weighing 3.12 kg with APGAR scores below:  APGARs 1 Min 5Min 10Min   Totals:  8  9              Hospital Course (HPI):   The  patient's hospital course was unremarkable.  On discharge, her pain was well controlled. Vaginal bleeding is decreased.  Voiding without difficulty.  Ambulating well and tolerating a normal diet.  No fever. Both breast and formula.  Infant is stable.  She was discharged on post-partum day #1. Contraception plan: to be discussed at clinic. Post partum depression education was provided.         D/C Physical Exam:     Vitals:    03/13/23 0949 03/13/23 1627 03/13/23 2015 03/14/23 0326   BP: 101/60 112/63 110/61 104/65   BP Location: Left arm Left arm     Patient Position: Semi-Martinez's Semi-Martinez's     Cuff Size: Adult Regular Adult Regular     Pulse: 68 77 73 79   Resp: 18 16 16 16   Temp: 98.7  F (37.1  C) 98.1  F (36.7  C) 98.5  F (36.9  C) 98.3  F (36.8  C)   TempSrc: Oral Oral Oral Oral   SpO2:  97%       GENERAL:         healthy, alert and no distress  RESPIRATORY:   No increased work of breathing, good air exchange, clear to auscultation bilaterally, no crackles or wheezing   CARDIOVASCULAR:   Normal apical impulse, regular rate and rhythm, normal S1 and S2, no S3 or S4, and no murmur noted   ABDOMEN:   No scars, normal bowel sounds, soft, non-distended, non-tender, no masses palpated, no hepatosplenomegally  Fundal height at level of umbilicus.  Firm and non tender.   EXTREMITIES:          Minimal edema, non-tender     Post-partum hemoglobin:   Hemoglobin   Date Value Ref Range Status   03/12/2023 10.0 (L) 11.7 - 15.7 g/dL Final           Discharge Instructions and Follow-Up:     Discharge diet: Regular   Discharge activity: Activity as tolerated   Discharge follow-up: Follow up with primary care provider in 2-3 weeks   Wound care: Drink plenty of fluids  Ice to area for comfort  Keep wound clean and dry            Discharge Disposition:     Discharged to home        Family phone number verified in EPIC and is correct Yes     Patient discussed with attending physician, Dr. Bekah Gillis , who agrees with the  plan.    Jef Pedraza DO PGY1 3/14/2023  [facility]    Name:  Dipti Kirkpatrick  :  2000  MRN:  [id number]

## 2023-03-14 NOTE — DISCHARGE INSTRUCTIONS
Postpartum Vaginal Delivery Instructions    Activity     Ask family and friends for help when you need it.  Do not place anything in your vagina for 6 weeks.  You are not restricted on other activities, but take it easy for a few weeks to allow your body to recover from delivery.  You are able to do any activities you feel up to that point.  No driving until you have stopped taking your pain medications (usually two weeks after delivery).     Call your health care provider if you have any of these symptoms:     Increased pain, swelling, redness, or fluid around your stiches from an episiotomy or perineal tear.  A fever above 100.4 F (38 C) with or without chills when placing a thermometer under your tongue.  You soak a sanitary pad with blood within 1 hour, or you see blood clots larger than a golf ball.  Bleeding that lasts more than 6 weeks.  Vaginal discharge that smells bad.  Severe pain, cramping or tenderness in your lower belly area.  A need to urinate more frequently (use the toilet more often), more urgently (use the toilet very quickly), or it burns when you urinate.  Nausea and vomiting.  Redness, swelling or pain around a vein in your leg.  Problems breastfeeding or a red or painful area on your breast.  Chest pain and cough or are gasping for air.  Problems coping with sadness, anxiety, or depression.  If you have any concerns about hurting yourself or the baby, call your provider immediately.   You have questions or concerns after you return home.     Keep your hands clean:  Always wash your hands before touching your perineal area and stitches.  This helps reduce your risk of infection.  If your hands aren't dirty, you may use an alcohol hand-rub to clean your hands. Keep your nails clean and short.    Vaginal Delivery Discharge Instructions: Gladisong  Ua zog:   Thov tsev neeg thiab phooj ywg pab thaum koj xav tau.  Tsis txhob alphonso ib yam dab tsi nyob sacha hauv koj qhov chaw mos txog thuam koj tus kws avery  mob pom zoo.  Lev li so ob peb burnett tiam kota ntej kom koj lub cev thiaj muaj sij hawm rov qab zoo. Thaum txog lub sij hawm ntawd koj ua tau love yam raws li koj xav tias koj ua tau.  Tsis txhob tsav tsheb thaum koj noj tshuaj raws li koj tus kws avery mob hais. Koj tsav tsheb los tau yog tias koj noj cov tshuaj uas koj yuav kota kiab khw.    Hu koj tus kws avery mob yog tias muaj ib yam nram qab no uas qhia tias koj muaj mob:  Koj cov ntshav ntxaum ib daim ntaub tas ua ntej dhau ib teev, los yog koj pom cov ntshav khov uas loj caty li ib lub pob golf.  Los ntshav ntev tshaj 6 lub burnett tiam.  Koj tawm kua hauv chaw mos uas tsw phem.   Ua npaws 100.4 ?F (38 ?C) rov richard (uas ntsuas hauv qab koj tus nplaig), tsis hais ua daus no los sis tsis ua daus no  Mob heev, plab khov los sis mob ntawm koj lub plab mog.  Haj yam hnov mob, qhov chaw phais liab liab, o o los sis muaj kua nyob ntawm koj cov xov xaws tawv nqaij.  Yuav tsum alphonso zis ntau zaus los yog yuav tsum dim zis heev, los sis kub kub thaum koj  zis.  Liab liab, o o los sis mob ntawm ib txoj leeg hauv koj txhais ceg.  Muaj teeb meem pub mis, los sis muaj ib qhov chaw liab los yog qhov chaw mob ntawm koj lub mis.  Hnov mob loj zuj zus los sis hnov mob tas li kota qab tau txiav chaw mos me ntsis los sis koj chaw mos ntuag.   Xeev siab thiab ntuav.  Koj mob hauv siab thiab hnoos los yog nyuaj sacha koj ua pa.  Muaj teeb meem ntawm josh tu siab, josh txhawj xeeb, los yog jsoh nyuaj siab.   Yog tias koj ntshai tias koj txhob txwm yuav ua sacha koj tus kheej los sis tus me nyuam mos liab mob, lev li hu koj tus kws avery mob kiag.   Koj muaj rojas nug los yog josh txhawj xeeb kota qab koj rov qab mus tsev.  Ntxuav cassi kom huv:  Ntxuav koj cassi ua ntej koj kov koj chaw mos thiab xov xaws tawv nqaij txhua lub sij hawm.  Qhov no yuav pab kom tsis txhob raug kab mob.  Yog tias koj txhais cassi huv lawm, koj siv tau ib daim ntxaum cawv so cassi kom ntxuav koj txhais cassi. Lev li tu koj sacha cassi kom luv  gayatri rodriguez.        Vaginal Delivery Discharge Instructions  Activity:   Ask family and friends for help when you need it.  Do not place anything in your vagina until your doctor approves.  Take it easy for the next few weeks to allow your body to recover. You may do any activities you feel up to at that point.  Do not drive while taking pain pills prescribed by your doctor. You may drive if taking over-the-counter pain pills.    Call your health care provider if you have any of these symptoms:  You soak a sanitary pad with blood within 1 hour, or you see blood clots larger than a golf ball.  Bleeding that lasts more than 6 weeks.  You have vaginal discharge that smells bad.   A fever of 100.4? F (38? C) or higher (temperature taken under your tongue), with or without chills   Severe, pain, cramping or tenderness in your lower belly area.  Increased pain, swelling, redness or fluid around your stitches.  A more frequent or urgent need to urinate (pee), or it burns when you pee.  Redness, swelling or pain around a vein in your leg.  Problems coping with sadness, anxiety, or depression.   Problems breastfeeding, or a red or painful area on your breast.  Pain that increases or does not go away from an episiotomy or perineal tear.  Nausea and vomiting.  Chest pain and cough or are gasping for air.  If you have any concerns about hurting yourself or the baby, call your doctor right away.   You have questions or concerns after you return home.    Keep your hands clean:  Always wash your hands before touching your perineal area and stitches.  This helps reduce your risk of infection.  If your hands aren t dirty, you may use an alcohol hand-rub to clean your hands. Keep your nails clean and short.  Breast Milk Home Storage Guidelines  Wash your hands before touching your breasts or milk containers.  Use containers that have been washed in hot, soapy water and rinsed.  All milk should be dated when storing.  Pumped milk  Can  be kept at 60 F for 24 hours  Can be kept at 66-72 F for 10 hours  Can be kept at 79 F for 4-8 hours  Can be refrigerated at 32-39 F for 8 days  In a freezer  Can be kept in a freezer located inside a refrigerator for 2 weeks  Can be kept in a self-contained freezer unit of a refrigerator (separate doors) for 3 or 4 months (temperature varies because the door opens frequently)  Can be kept in a separate deep freeze at a constant 0 F for 6-12 months  Containers  Use plastic or glass containers with tops that fit well.  Use special bags designed for breast milk storage.  Bottle liners are not recommended (they rip and tear easily).  Freezing  Label with date as well as name, if milk will be stored with milk for more than one baby.  Use the oldest milk first.  Do not store milk in the freezer door.  Do not refreeze milk that has been totally thawed (a tiny bit of thawing on the top is okay to refreeze).  Freeze milk in 2-4 ounce servings to prevent wasting   breast milk.  Fill container 3/4 full to allow room for expansion.  Cooled milk can be added to frozen milk if it is small enough that it does not thaw the frozen milk.  How to warm milk   Thaw and/or heat under warm, running water or in a container of warm tap water; do not bring temperature of milk to boiling point, as heat destroys nutrients and live cells (which prevent infection).  Shake before testing the temperature (it is normal for milk to separate into a milk and cream layer).  Do not use microwave to heat breast milk; breast milk heated in the microwave tends to have  hot spots  that could burn your baby.  Thawed milk  Do not refreeze thawed milk.  Thawed milk can be refrigerated up to 24 hours.  Expressed milk can be kept in a common refrigerator at your workplace or day care center. The [place] Center for Disease Control and OSHA agree that human milk is not among those fluids that require special handling or storage in separate containers.

## 2023-03-14 NOTE — PROGRESS NOTES
VSS, fundus firm , minimal bleeding, no S& S of infection and preeclampsia, pain well controled , discharge instructions were explained, questions answered,  arm band checked, AVS singed. Mutony  (zita Garcia) was used for discharge instruction.

## 2023-03-14 NOTE — LACTATION NOTE
This note was copied from a baby's chart.  Referred to Dipti to assist with feeding information.She showed interest in  breast feeding but also using formula. With the assistance of her  as , several topics were discussed. These included reviewing benefits of breastfeeding and importance of 8 or more stimulations per 24 hours to develop  A milk supply.     She was invited to ask for LC assistance in feeding her baby as she felt was needed.

## 2023-03-14 NOTE — PROGRESS NOTES
Outreach Note for AVNI Kirkpatrick  [id number]  2000    Discharge follow-up plan discussed with patient, needs assessed. Pt requests all follow-up through clinic/physician, declines home care visit, unless medically indicated and ordered by physician, and declines follow-up phone call.   No further needs identified at this time.

## 2023-03-28 ENCOUNTER — OFFICE VISIT (OUTPATIENT)
Dept: FAMILY MEDICINE | Facility: CLINIC | Age: 23
End: 2023-03-28
Payer: COMMERCIAL

## 2023-03-28 VITALS
BODY MASS INDEX: 25.88 KG/M2 | HEART RATE: 85 BPM | WEIGHT: 119.6 LBS | DIASTOLIC BLOOD PRESSURE: 71 MMHG | RESPIRATION RATE: 20 BRPM | OXYGEN SATURATION: 96 % | TEMPERATURE: 98.4 F | SYSTOLIC BLOOD PRESSURE: 111 MMHG

## 2023-03-28 DIAGNOSIS — R22.31 MASS OF RIGHT AXILLA: ICD-10-CM

## 2023-03-28 DIAGNOSIS — K59.09 OTHER CONSTIPATION: ICD-10-CM

## 2023-03-28 DIAGNOSIS — Z01.84 LACK OF IMMUNITY TO HEPATITIS B VIRUS DEMONSTRATED BY SEROLOGIC TEST: ICD-10-CM

## 2023-03-28 DIAGNOSIS — M54.50 ACUTE BILATERAL LOW BACK PAIN WITHOUT SCIATICA: ICD-10-CM

## 2023-03-28 DIAGNOSIS — N89.8 VAGINAL IRRITATION: ICD-10-CM

## 2023-03-28 PROBLEM — Z34.00 FIRST PREGNANCY: Status: RESOLVED | Noted: 2022-09-07 | Resolved: 2023-03-28

## 2023-03-28 PROBLEM — O09.90 SUPERVISION OF HIGH RISK PREGNANCY, ANTEPARTUM: Status: RESOLVED | Noted: 2022-09-09 | Resolved: 2023-03-28

## 2023-03-28 PROBLEM — O47.9 UTERINE CONTRACTIONS: Status: RESOLVED | Noted: 2023-03-12 | Resolved: 2023-03-28

## 2023-03-28 PROBLEM — O99.820 GBS (GROUP B STREPTOCOCCUS CARRIER), +RV CULTURE, CURRENTLY PREGNANT: Status: RESOLVED | Noted: 2023-02-17 | Resolved: 2023-03-28

## 2023-03-28 PROCEDURE — 99207 PR POST PARTUM EXAM: CPT | Mod: GC

## 2023-03-28 PROCEDURE — 87210 SMEAR WET MOUNT SALINE/INK: CPT

## 2023-03-28 NOTE — PROGRESS NOTES
DATE:  3/28/2023  CHIEF COMPLAINT:    Chief Complaint   Patient presents with     Post Partum Exam     Post partum follow up             SUBJECTIVE       Dipti Kirkpatrick is a  Female who is 22 year old years old with a PMHx significant for:     Patient Active Problem List   Diagnosis     Language barrier, cultural differences     Lack of immunity to hepatitis B virus demonstrated by serologic test     confirmed and updated problem list    She is Here today for her 2 week post-partum visit. Delivery Date: 3/13/2023    She had a  of viable Baby girl, who weighed 3.12 kg, with no complications. There was 2nd degree laceration which was repaired.      Current Concerns: pain with bowel movements, using Miralax everyday, hard stools  - Lump under the arm, no drainage    Post Partum ROS:  1) How are your feeding your baby and do you have any concerns about this? breastfeeding going well, every 1-3 hrs, 8-12 times/24 hours and pumped breastmilk by bottle    2) Breast Feeding: by pumping  8-12 times in 24 hours(every 2-4 hours) with night time awakenings for feeding every 2-4 hours.  Do you have any red or painful areas of the breast, any fevers or chills: No   - Pans to formula feed in the future what if breastmilk is not coming in as well.    3) Post-partum Vaginal Bleeding: YES, lighter than a period. Decreased from delivery.     4) Vaginal Tissue: healing well, no complaints.    5)Hemorrhoids: unkown, per patient    6) Returning to [place]:   Patient has not had intercourse since delivery.  It is safe to do this 6 weeks after a vaginal delivery.  Your libido may be low due to the breastfeeding hormones (prolactin) or due to fatigue from taking care of baby and all of the touching and loving interactions you have with your baby.  This is normal and will fade with time.    7) Abdominal pain: No  - Back pain: hurts sitting and walking for long time.    8) Contraception: Exclusive breast feeding is a form of  contraception but progesterone only and barrier methods of contraception are more reliable and safe during breastfeeding.  If not breastfeeding, any form of contraception is safe and recommended to space babies the recommended 18 months apart.   What form of contraception is desired? - Natural family planning    9) Sleep/stress/support/caring for baby is good.  is helping, but will be going back to work next Monday.    10) Postpartum depression screening: declined.  - Feels like mood is better than when baby was inside of her. She      A [place]  was used for this visit        OBJECTIVE     Physical Exam:  Vitals:    23 1502   BP: 111/71   Pulse: 85   Resp: 20   Temp: 98.4  F (36.9  C)   TempSrc: Tympanic   SpO2: 96%   Weight: 54.3 kg (119 lb 9.6 oz)     Body mass index is 25.88 kg/m .    Gen:  NAD  HEENT: mucous membranes moist  Neck: supple without lymphadenopathy  CV:  RRR  - no murmurs, rubs, or karri  Breast: Normal-appearing breasts bilaterally, 2 cm firm and rubbery lump present in right axillary region  Pulm:  CTAB, no wheezes/rales/rhonchi  ABD: soft, nontender, no masses, no rebound  : normal female external genitalia, well healed perineum, normal post partum uterus  Psych: Mood and affect appropriate    LABS:  No results found for this or any previous visit (from the past 24 hour(s)).   Last PAP: No results found for: PAP    ASSESSMENT AND PLAN     Dipti Kirkpatrick is a  Female who is 22 year old years old with a PMHx significant for:     Patient Active Problem List   Diagnosis     Language barrier, cultural differences     Lack of immunity to hepatitis B virus demonstrated by serologic test       1. 2 week post-partum visit: Doing well.     2. Post- partum depression screen: negative.     3. Breastfeeding problems or questions: No    4. Pap smear was not done at this time due to laceration repair continuing to heal. Due for one in the future. Never completed.    5.  Immunizations given: None  - Due for Hepatitis B on April 5, 2022.    6. Contraception Plan:natural family planning.     7. History of GDM: no    8. History of Hypertensive of pregnancy disorders: no    10. Follow-up: RTC in 4 weeks for follow up for post-partum mood and lump in the breast or sooner if develops new or worsening symptoms.    Dipti was seen today for post partum exam.    Diagnoses and all orders for this visit:    Routine postpartum follow-up  Single liveborn infant delivered vaginally  Patient is 2 weeks postpartum today.  She is pumping to breast-feed her infant and plans to use formula in the future.   is helping with the night feeds.  She states her mood with baby is good.  She has not returned to intercourse.  They plan on natural family planning when they do resume.  - Future need for pap smear    Other constipation  Patient endorses straining and pain with having bowel movements.  She was given MiraLAX on discharge from the hospital.  However she is not taking this every day.  - Use 3 capfuls of Miralax daily for at least the next 2 weeks. Goal is to have one soft bowel movement without pain daily. Once stools are runny, you may decrease to 2 capfuls a day.    Mass of right axilla  Firm, rubbery lump measuring about 2 cm present in the right axillary region.  Possibly milk duct as part of mammary tissue.  Has enlarged since breast-feeding but present before she became pregnant.  Remainder of breast exam within normal limits.  - Follow-up in 1 month for further enlargement.    Vaginal irritation  Patient states she has slight vaginal bleeding.  She also endorses having weight thicker discharge with irritation.  She would like to be tested for BV and yeast infections today.  -     Wet preparation    Acute bilateral low back pain without sciatica  Worse with standing and sitting for long periods.  - Massage and heat will help with this. You may also take tylenol or ibuprofen for this; it is  safe to take this and breastfeed.    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  - Future need for 3rd vaccination of Hepatitis B (no sooner than April 5th, 2023)        Patient was precepted with and seen by Dr. Jonn Judge.    Ruthie Gamble MD PGY2  [address] [address]  [phone]

## 2023-03-28 NOTE — PATIENT INSTRUCTIONS
Thank you for coming to see me today in clinic!    Today we discussed:  - Constipation: use 3 capfuls of Miralax daily for at least the next 2 weeks. Goal is to have one soft bowel movement without pain daily. Once stools are runny, you may decrease to 2 capfuls a day.  - Back pain: massage and heat will help with this. You may also take tylenol or ibuprofen for this; it is safe to take this and breastfeed.    If you have any further questions, please call the clinic!    Have a wonderful rest of your day!    Postpartum Depression - English

## 2023-03-31 ENCOUNTER — TELEPHONE (OUTPATIENT)
Dept: FAMILY MEDICINE | Facility: CLINIC | Age: 23
End: 2023-03-31

## 2023-03-31 NOTE — TELEPHONE ENCOUNTER
Patient schedule with Dr. Robison for form apptment: [id number]. Per Dr. Robison, unable to do visit due to billing has not been establishing for type of visit yet at [place].    Called patient to cancel appointment, giving reason why clinic is not able to do visit per billing purpose. Give [facility] clinic information to patient and family. To ask for [place]- to assist with schedule appointment along with inform of estimate of cost if insurance does not cover visit type.    Janett Kirkpatrick, CMA

## 2023-04-21 ENCOUNTER — OFFICE VISIT (OUTPATIENT)
Dept: FAMILY MEDICINE | Facility: CLINIC | Age: 23
End: 2023-04-21
Payer: COMMERCIAL

## 2023-04-21 VITALS
DIASTOLIC BLOOD PRESSURE: 69 MMHG | HEART RATE: 69 BPM | BODY MASS INDEX: 23.93 KG/M2 | OXYGEN SATURATION: 98 % | SYSTOLIC BLOOD PRESSURE: 106 MMHG | WEIGHT: 110.6 LBS | RESPIRATION RATE: 12 BRPM

## 2023-04-21 DIAGNOSIS — L53.9 ERYTHEMA: Primary | ICD-10-CM

## 2023-04-21 PROCEDURE — 99213 OFFICE O/P EST LOW 20 MIN: CPT | Mod: GC

## 2023-04-21 RX ORDER — CETIRIZINE HYDROCHLORIDE 10 MG/1
10 TABLET ORAL DAILY
Qty: 10 TABLET | Refills: 0 | Status: SHIPPED | OUTPATIENT
Start: 2023-04-21 | End: 2023-09-21

## 2023-04-21 RX ORDER — DIAPER,BRIEF,INFANT-TODD,DISP
EACH MISCELLANEOUS 2 TIMES DAILY
Qty: 28 G | Refills: 0 | Status: SHIPPED | OUTPATIENT
Start: 2023-04-21 | End: 2023-09-21

## 2023-04-21 RX ORDER — DIAPER,BRIEF,INFANT-TODD,DISP
EACH MISCELLANEOUS 2 TIMES DAILY
Qty: 28 G | Refills: 0 | Status: SHIPPED | OUTPATIENT
Start: 2023-04-21 | End: 2023-04-21

## 2023-04-21 NOTE — PROGRESS NOTES
Preceptor Attestation:    I discussed the patient with the resident and evaluated the patient in person. I have verified the content of the note, which accurately reflects my assessment of the patient and the plan of care.   Supervising Physician:  Jung Perkins MD.

## 2023-04-21 NOTE — PROGRESS NOTES
Assessment & Plan     Erythema  Rash of perineum and inner thighs: raised, large, uritcaric, erythematous. Started 2-3 days ago, worsening. Pruritic. Delivered 3/13 via  with second degree tear, repaired and healing well. Using Dermaplast spray postpartum. Recommended stopping all previous topical sprays/creams as may be from contact irritant. No intercourse since delivery. Also consider postpartum PUPPP rash. Prescribed topical hydrocortisone for a couple days and oral cetirizine for itching. Recommend follow up in a couple days, already has appointment .   - cetirizine (ZYRTEC) 10 MG tablet  Dispense: 10 tablet; Refill: 0  - hydrocortisone (CORTAID) 1 % external ointment  Dispense: 28 g; Refill: 0        Return in about 5 days (around 2023) for Follow up.    Linda Azevedo MD PGY2  [facility]  22    I precepted today with Dr. Gregorio Watson   Dipti is a 22 year old, presenting for the following health issues:  Swelling (Vaginal area is swollen, bleeding is spotting. She does have bertrand itching, she did tear during the delivery )            2023     1:29 PM   Additional Questions   Roomed by ngf   Accompanied by self,  and baby         2023     1:29 PM   Patient Reported Additional Medications   Patient reports taking the following new medications none     HPI   Swollen, itchy rash of vagina and inner thighs  Second degree tear - received stitches   Started a couple days ago   Delivered 3/13 via   Rash tender to touch  Abnormal vaginal discharge - sticky, white for awhile   vaginal Bleeding-Minimal, improving  No fevers or chills   No intercourse since delivery   She has been using dermaplast and other topicals      Review of Systems   Constitutional, HEENT, cardiovascular, pulmonary, gi and gu systems are negative, except as otherwise noted.      Objective    /69 (BP Location: Left arm, Patient Position: Sitting, Cuff Size: Adult  Regular)   Pulse 69   Resp 12   Wt 50.2 kg (110 lb 9.6 oz)   SpO2 98%   Breastfeeding Yes   BMI 23.93 kg/m    Body mass index is 23.93 kg/m .     Physical Exam   GENERAL: healthy, alert and no distress  RESP:breathing comfortably on room air  CV: extremities appear well perfused  : diffuse large raised erythematous rash involving perineum and inner thighs.  MS:  No signficant edema

## 2023-04-25 ENCOUNTER — OFFICE VISIT (OUTPATIENT)
Dept: FAMILY MEDICINE | Facility: CLINIC | Age: 23
End: 2023-04-25
Payer: COMMERCIAL

## 2023-04-25 VITALS
TEMPERATURE: 98.8 F | SYSTOLIC BLOOD PRESSURE: 96 MMHG | DIASTOLIC BLOOD PRESSURE: 60 MMHG | BODY MASS INDEX: 24.58 KG/M2 | OXYGEN SATURATION: 97 % | RESPIRATION RATE: 16 BRPM | WEIGHT: 113.6 LBS | HEART RATE: 66 BPM

## 2023-04-25 DIAGNOSIS — Z23 NEED FOR HPV VACCINATION: ICD-10-CM

## 2023-04-25 DIAGNOSIS — N89.8 VAGINAL DRYNESS: ICD-10-CM

## 2023-04-25 DIAGNOSIS — Z23 NEED FOR HEPATITIS B VACCINATION: ICD-10-CM

## 2023-04-25 PROCEDURE — 99207 PR POST PARTUM EXAM: CPT | Mod: 25 | Performed by: STUDENT IN AN ORGANIZED HEALTH CARE EDUCATION/TRAINING PROGRAM

## 2023-04-25 PROCEDURE — 90471 IMMUNIZATION ADMIN: CPT | Performed by: STUDENT IN AN ORGANIZED HEALTH CARE EDUCATION/TRAINING PROGRAM

## 2023-04-25 PROCEDURE — 90746 HEPB VACCINE 3 DOSE ADULT IM: CPT | Performed by: STUDENT IN AN ORGANIZED HEALTH CARE EDUCATION/TRAINING PROGRAM

## 2023-04-25 PROCEDURE — 90651 9VHPV VACCINE 2/3 DOSE IM: CPT | Performed by: STUDENT IN AN ORGANIZED HEALTH CARE EDUCATION/TRAINING PROGRAM

## 2023-04-25 PROCEDURE — 90472 IMMUNIZATION ADMIN EACH ADD: CPT | Performed by: STUDENT IN AN ORGANIZED HEALTH CARE EDUCATION/TRAINING PROGRAM

## 2023-04-25 NOTE — PROGRESS NOTES
"  Assessment & Plan   Encounter Date: Apr 25, 2023    Routine postpartum follow-up  Discussed breastfeeding, libido, vaginal lubrication, use of condoms.  Pelvic exam was significant for a small cystocele.  Discussed Kegel exercises.     Vaginal dryness  Recommend water-based lubricant.  Mucosa is well-healed.    Need for hepatitis B vaccination  HBV #2 given today.     Need for HPV vaccination  HPV #1 given today.  Needs Pap.  Declined today.       Follow-Up:   Will need HepB #3 on or after 10/27/23.    Sol Jorge MD PGY-3   [facility]   Staffed with Dr. Cruz.        ______________________________________________________    Subjective     HPI:  Ms. Dipti Kirkpatrick is a(n) 22 year old female who presents today as a return patient for:  other (Follow up from last visit )      She is here for a 6-week postpartum checkup.  She has had some minimal spotting but no significant bleeding.  She is bonding with baby well.  She and her partner have not resumed intercourse.  They were planning for \"natural birth control\" for contraception.  They planned to abstain from all intercourse until being ready to have another child, when their current child is school-aged.  Patient is also concern of a \"bulge\" in her vagina.  She also reports inadequate vaginal lubrication during intercourse and is wondering if there is anything else she could do.        Preventive Care:  She is due for her second hep B vaccination.  We also discussed HPV vaccination, which patient accepted and this was given today    Patient is otherwise feeling well, without additional concerns.    A remote phone eEvent  was used for this visit.    Data Reviewed:    Office Visit on 03/28/2023   Component Date Value Ref Range Status     Trichomonas 03/28/2023 Absent  Absent Final     Yeast 03/28/2023 Absent  Absent Final     Clue Cells 03/28/2023 Absent  Absent Final     WBCs/high power field 03/28/2023 3+ (A)  None Final "       BP Readings from Last 3 Encounters:   04/25/23 96/60   04/21/23 106/69   03/28/23 111/71    Wt Readings from Last 3 Encounters:   04/25/23 51.5 kg (113 lb 9.6 oz)   04/21/23 50.2 kg (110 lb 9.6 oz)   03/28/23 54.3 kg (119 lb 9.6 oz)               Medications:    Current Outpatient Medications   Medication     cetirizine (ZYRTEC) 10 MG tablet     hydrocortisone (CORTAID) 1 % external ointment     acetaminophen (TYLENOL) 325 MG tablet     benzocaine-menthol (DERMOPLAST) 20-0.5 % AERO     ibuprofen (ADVIL/MOTRIN) 800 MG tablet     lanolin ointment     polyethylene glycol (MIRALAX) 17 GM/Dose powder     Prenatal Vit-Fe Fumarate-FA (PRENATAL VITAMINS) 28-0.8 MG TABS     No current facility-administered medications for this visit.               Objective     BP 96/60 (BP Location: Left arm, Patient Position: Sitting, Cuff Size: Adult Regular)   Pulse 66   Temp 98.8  F (37.1  C) (Oral)   Resp 16   Wt 51.5 kg (113 lb 9.6 oz)   SpO2 97%   Breastfeeding Yes   BMI 24.58 kg/m      Pertinent Physical Exam Findings:    Gen: Pleasant female in no acute distress.  Appears stated age.  Casually groomed.   Skin:  No pallor.  Pelvic Exam:    Vulva: No external lesions, normal hair distribution  Vagina: Moist, pink, scant bloody discharge, well rugated, no lesions.  Well healed.  There is a small cystocele that protrudes slightly with valsalva.  No tenderness of pelvic floor muscles.  Decreased pelvic floor muscle strength. No uterine prolapse.           No results found for any visits on 04/25/23.             ----- Service Performed and Documented by Resident or Fellow ------

## 2023-04-25 NOTE — PROGRESS NOTES
Preceptor Attestation:    I discussed the patient with the resident and evaluated the patient in person. I have verified the content of the note, which accurately reflects my assessment of the patient and the plan of care.   Supervising Physician:  Raymodn Cruz MD.

## 2023-05-17 ENCOUNTER — MEDICAL CORRESPONDENCE (OUTPATIENT)
Dept: HEALTH INFORMATION MANAGEMENT | Facility: CLINIC | Age: 23
End: 2023-05-17
Payer: COMMERCIAL

## 2023-07-17 ENCOUNTER — MEDICAL CORRESPONDENCE (OUTPATIENT)
Dept: HEALTH INFORMATION MANAGEMENT | Facility: CLINIC | Age: 23
End: 2023-07-17
Payer: COMMERCIAL

## 2023-09-06 ENCOUNTER — OFFICE VISIT (OUTPATIENT)
Dept: FAMILY MEDICINE | Facility: CLINIC | Age: 23
End: 2023-09-06
Payer: COMMERCIAL

## 2023-09-06 VITALS
TEMPERATURE: 98.6 F | OXYGEN SATURATION: 99 % | SYSTOLIC BLOOD PRESSURE: 104 MMHG | WEIGHT: 112.4 LBS | HEART RATE: 69 BPM | BODY MASS INDEX: 24.32 KG/M2 | DIASTOLIC BLOOD PRESSURE: 72 MMHG | RESPIRATION RATE: 16 BRPM

## 2023-09-06 DIAGNOSIS — N89.8 VAGINAL DISCHARGE: Primary | ICD-10-CM

## 2023-09-06 DIAGNOSIS — N94.9 VAGINAL SYMPTOM: ICD-10-CM

## 2023-09-06 DIAGNOSIS — N92.6 IRREGULAR MENSES: ICD-10-CM

## 2023-09-06 LAB
ALBUMIN UR-MCNC: NEGATIVE MG/DL
APPEARANCE UR: CLEAR
BILIRUB UR QL STRIP: NEGATIVE
CLUE CELLS: ABNORMAL
COLOR UR AUTO: YELLOW
GLUCOSE UR STRIP-MCNC: NEGATIVE MG/DL
HCG UR QL: NEGATIVE
HGB UR QL STRIP: NEGATIVE
KETONES UR STRIP-MCNC: NEGATIVE MG/DL
LEUKOCYTE ESTERASE UR QL STRIP: NEGATIVE
NITRATE UR QL: NEGATIVE
PH UR STRIP: 5.5 [PH] (ref 5–8)
SP GR UR STRIP: <=1.005 (ref 1–1.03)
TRICHOMONAS, WET PREP: ABNORMAL
UROBILINOGEN UR STRIP-ACNC: 0.2 E.U./DL
WBC'S/HIGH POWER FIELD, WET PREP: ABNORMAL
YEAST, WET PREP: ABNORMAL

## 2023-09-06 PROCEDURE — 87591 N.GONORRHOEAE DNA AMP PROB: CPT

## 2023-09-06 PROCEDURE — 99214 OFFICE O/P EST MOD 30 MIN: CPT | Mod: GC

## 2023-09-06 PROCEDURE — 87491 CHLMYD TRACH DNA AMP PROBE: CPT

## 2023-09-06 PROCEDURE — 81003 URINALYSIS AUTO W/O SCOPE: CPT

## 2023-09-06 PROCEDURE — 81025 URINE PREGNANCY TEST: CPT

## 2023-09-06 PROCEDURE — 87210 SMEAR WET MOUNT SALINE/INK: CPT

## 2023-09-06 RX ORDER — METRONIDAZOLE 500 MG/1
2000 TABLET ORAL ONCE
Qty: 4 TABLET | Refills: 0 | Status: SHIPPED | OUTPATIENT
Start: 2023-09-06 | End: 2023-09-06

## 2023-09-06 NOTE — NURSING NOTE
Due to patient being non-English speaking/uses sign language, an  was used for this visit. Only for face-to-face interpretation by an external agency, date and length of interpretation can be found on the scanned worksheet.     name: Thor Matta  Agency: [employer]  Language: Nahed   Telephone number: [phone]  Type of interpretation: Face-to-face, spoken

## 2023-09-06 NOTE — PROGRESS NOTES
Assessment & Plan     Vaginal discharge  Vaginal symptom   6mo postpartum endorsing vaginal odor, yellow discharge since delivery. Currently breastfeeding, Sexually active and not on contraception has had period Aug 2nd since pregnancy.Wet prep, ua negative.patients vitals within the normal range.exam negative for super pubic pressure or cva tenderness.Suspect pH imbalance versus acute infection. However given patients clinical discomfort,will treat with FLAGYL 2g once. Patient will follow up in one week with PCP for well women exam including paps smear.   -encouraged increased fluid intake   -cotton lined undergarments   - Wet preparation  - NEISSERIA GONORRHOEA PCR  - CHLAMYDIA TRACHOMATIS PCR  - Urine Macroscopic with reflex to Microscopic  - metroNIDAZOLE (FLAGYL) 500 MG tablet  Dispense: 4 tablet; Refill: 0    Irregular menses  - HCG qualitative urine      Patient Instructions   Thank you for trusting us with your care.     Here's a summary of the visit today:   Take metronidazole 2g once   Follow up with PCP in 1 week for well women's exam              Thank you.     Dr. Prasad      Return in about 1 week (around 2023).    Everett Prasad DO, PGY-3  [facility]    Today I precepted with Dr. Nancy MD, who agrees with the assessment and plan.      Shirley Resendez is a 22 year old, presenting for the following health issues:  Other (Yellow discharge after March she has been having yellow discharge (pt feels down there doesn't feel the same) )        2023     1:29 PM   Additional Questions   Roomed by ngf   Accompanied by self,  and baby       HPI       Description:  Vaginal Discharge: yellow discharge, mucusy x 4.5 months  ago, although noticed soon after healing from laceration repair  Itching (Pruritis): YES after showers only   Burning sensation:  no   Odor: no   Accompanying Signs & Symptoms:  Pain with Urination: no   Abdominal Pain: no   Fever: no   History:    Sexually active: no   New Partner: no   Possibility of Pregnancy:  Yes  Precipitating factors:   Recent Antibiotic Use: no   Alleviating factors: none   Therapies Tried and outcome: herbal powder    2nd deg vaginal laceration that was repaired postpartum     Has not had  sexual drive since giving birth    Regular period, last LMP aug 2nd first one since delivery.     Does not use contraception.       Review of Systems   Constitutional, HEENT, cardiovascular, pulmonary, gi and gu systems are negative, except as otherwise noted.      Objective    /72 (BP Location: Left arm, Patient Position: Sitting, Cuff Size: Adult Regular)   Pulse 69   Temp 98.6  F (37  C) (Oral)   Resp 16   Wt 51 kg (112 lb 6.4 oz)   LMP 08/02/2023 (Exact Date)   SpO2 99%   Breastfeeding Yes   BMI 24.32 kg/m    Body mass index is 24.32 kg/m .  Physical Exam   GENERAL: healthy, alert and no distress  NECK: no adenopathy, no asymmetry, masses, or scars and thyroid normal to palpation  RESP: lungs clear to auscultation - no rales, rhonchi or wheezes  CV: regular rate and rhythm, normal S1 S2, no S3 or S4, no murmur, click or rub, no peripheral edema and peripheral pulses strong  ABDOMEN: soft, nontender, no hepatosplenomegaly, no masses and bowel sounds normal. Neg cva  MS: no gross musculoskeletal defects noted, no edema    Results for orders placed or performed in visit on 09/06/23 (from the past 24 hour(s))   Wet preparation    Specimen: Vagina; Swab   Result Value Ref Range    Trichomonas Absent Absent    Yeast Absent Absent    Clue Cells Absent Absent    WBCs/high power field 3+ (A) None    Narrative    Bacteria few, odor none   Urine Macroscopic with reflex to Microscopic   Result Value Ref Range    Color Urine Yellow Colorless, Straw, Light Yellow, Yellow    Appearance Urine Clear Clear    Glucose Urine Negative Negative mg/dL    Bilirubin Urine Negative Negative    Ketones Urine Negative Negative mg/dL    Specific Gravity  Urine <=1.005 1.005 - 1.030    Blood Urine Negative Negative    pH Urine 5.5 5.0 - 8.0    Protein Albumin Urine Negative Negative mg/dL    Urobilinogen Urine 0.2 0.2, 1.0 E.U./dL    Nitrite Urine Negative Negative    Leukocyte Esterase Urine Negative Negative    Narrative    Microscopic not indicated   HCG qualitative urine   Result Value Ref Range    hCG Urine Qualitative Negative Negative

## 2023-09-06 NOTE — PROGRESS NOTES
Preceptor Attestation:    I discussed the patient with the resident and evaluated the patient in person. I have verified the content of the note, which accurately reflects my assessment of the patient and the plan of care.   Supervising Physician:  Raymond Cruz MD.

## 2023-09-06 NOTE — PATIENT INSTRUCTIONS
Thank you for trusting us with your care.     Here's a summary of the visit today:   Take metronidazole 2g once   Follow up with PCP in 1 week for well women's exam              Thank you.     Dr. Prasad

## 2023-09-07 LAB
C TRACH DNA SPEC QL NAA+PROBE: NEGATIVE
N GONORRHOEA DNA SPEC QL NAA+PROBE: NEGATIVE

## 2023-09-21 ENCOUNTER — OFFICE VISIT (OUTPATIENT)
Dept: FAMILY MEDICINE | Facility: CLINIC | Age: 23
End: 2023-09-21
Payer: COMMERCIAL

## 2023-09-21 VITALS
HEART RATE: 71 BPM | SYSTOLIC BLOOD PRESSURE: 99 MMHG | BODY MASS INDEX: 23.51 KG/M2 | RESPIRATION RATE: 20 BRPM | DIASTOLIC BLOOD PRESSURE: 62 MMHG | WEIGHT: 112 LBS | OXYGEN SATURATION: 100 % | TEMPERATURE: 98.1 F | HEIGHT: 58 IN

## 2023-09-21 DIAGNOSIS — Z12.4 CERVICAL CANCER SCREENING: Primary | ICD-10-CM

## 2023-09-21 DIAGNOSIS — Z00.00 ROUTINE GENERAL MEDICAL EXAMINATION AT A HEALTH CARE FACILITY: ICD-10-CM

## 2023-09-21 DIAGNOSIS — N89.8 VAGINAL DISCHARGE: ICD-10-CM

## 2023-09-21 PROCEDURE — 90471 IMMUNIZATION ADMIN: CPT

## 2023-09-21 PROCEDURE — 99395 PREV VISIT EST AGE 18-39: CPT | Mod: 25

## 2023-09-21 PROCEDURE — 90651 9VHPV VACCINE 2/3 DOSE IM: CPT

## 2023-09-21 PROCEDURE — G0145 SCR C/V CYTO,THINLAYER,RESCR: HCPCS

## 2023-09-21 NOTE — PROGRESS NOTES
Preceptor Attestation:    I discussed the patient with the resident and evaluated the patient in person. I have verified the content of the note, which accurately reflects my assessment of the patient and the plan of care.   Supervising Physician:  Arun Selby MD.

## 2023-09-21 NOTE — PATIENT INSTRUCTIONS
Preventive Health Recommendations  Female Ages 21 to 25     Yearly exam:   See your health care provider every year in order to  Review health changes.   Discuss preventive care.    Review your medicines if your doctor has prescribed any.    You should be tested each year for STDs (sexually transmitted diseases).     Talk to your provider about how often you should have cholesterol testing.    Get a Pap test every three years. If you have an abnormal result, your doctor may have you test more often.    If you are at risk for diabetes, you should have a diabetes test (fasting glucose).     Shots:   Get a flu shot each year.   Get a tetanus shot every 10 years.   Consider getting the shot (vaccine) that prevents cervical cancer (Gardasil).    Nutrition:   Eat at least 5 servings of fruits and vegetables each day.  Eat whole-grain bread, whole-wheat pasta and brown rice instead of white grains and rice.  Get adequate Calcium and Vitamin D.     Lifestyle  Exercise at least 150 minutes a week each week (30 minutes a day, 5 days a week). This will help you control your weight and prevent disease.  Limit alcohol to one drink per day.  No smoking.   Wear sunscreen to prevent skin cancer.  See your dentist every six months for an exam and cleaning.          Sunscreens  Arguably the most important part of taking care of your skin whether you have concerns around acne, hyperpigmentation/dark spots/uneven skin tone, wrinkles/photo-aging, dryness, preventing skin cancer or have other common skin conditions such as eczema.     The best sunscreen? The one you wear! It can be overwhelming to choose, but the most important thing is to wear sunscreen daily, broad spectrum (UVA and UVB protection) and spf 30 or more. It's recommended to reapply every 2 hours (especially if sweating or in water). Though this is ideal, most people find applying twice a day is more doable- do what you can. For more detail though, let's dive in.  "    Mineral vs chemical: Both are effective and safe. Mineral sunscreens tend to be better for sensitive skin though can leave a \"white cast\" and needs to be rubbed in more. Can add a small amount of foundation to tint if white cast is too noticeable. Chemical sunscreens can sometimes irritate the skin and especially be irritating if it gets into the eyes (so not as suitable for young children). Can look for \"oxybenzone free\" to avoid irritation in chemical sunscreens.     Tinted vs untinted: \"broad spectrum\" is sufficient, but a tinted sunscreen can give additional protection from skin damage from visible light. It can especially work better for darker skin tones in preventing hyperpigmentation / dark spots.     Moisturizers with spf 30 or more is a great option! Makeup with spf does not provide enough protection on its own and generally do not work well.      A nonexclusive list of examples from some reliable brands:  Under $20  -CeraVe hydrating sunscreen sheer tint spf 30: Pro- tinted, niacinamide, hyaluronic acid    -Cetaphil sheer mineral face sunscreen spf 50: Pro- water resistant.   -Sun Bum spf 30: Pros- has a tint  -Black Girl sunscreen broad spectrum spf 30  -Beauty of [place] relief sun rice + probiotics spf 50: Pros- has niacinamide     Under $10  -Eucerin daily hydration cream spf 30: No white cast. Great as an everyday product.  -Banana Boat sport mineral: Leaves a white cast, though water resistant and non irritating  -[employer] sensitive skin sunscreen spf 50  -Black Girl sunscreen kids spf 50 - Pro: Water resistant, no white cast         Moisturizers  Use right away after showering. Also for use typically twice daily or more as needed. For day time use look for ones with spf.   All the below are under $20. Examples from some reliable brands:  -CeraVe:\"AM\" with spf, \"PM\" is great for every day including daytime but doesn't have spf. Does have niacinamide  - Vanicream: Very thick, great for sensitive " skin, great for full body  - Eucerin: Many good options including very hydrating and spf versions  -La Roche Posay Lipmanisha Allan: Niacinamide and hyaluronic acid. Very healing, especially great for winter. Good for whole body.    - Neutrogena  - Aquaphor  -Need A LOT of moisture? Vaseline, CeraVe healing ointment  -Acne? Cetaphil gentle clear mattifying acne moisturizer has 2% salicylic acid. Don't use many times daily

## 2023-09-21 NOTE — LETTER
September 29, 2023      Dipti Kirkpatrick  [address] [place] [address]        Dear ,    We are writing to inform you of your test results.    Your routine pap exam was normal and healthy, no signs of cancer or other concerns.     Great job taking care of your health!     Resulted Orders   Pap Screen only - recommended age 21 - 24 years   Result Value Ref Range    Interpretation        Negative for Intraepithelial Lesion or Malignancy (NILM)    Comment         Papanicolaou Test Limitations:  Cervical cytology is a screening test with limited sensitivity, and regular screening is critical for cancer prevention.  Pap tests are primarily effective for the diagnosis/prevention of squamous cell carcinoma, not adenocarcinoma or other cancers.        Specimen Adequacy       Satisfactory for evaluation, endocervical/transformation zone component present    Clinical Information       post-partum      Reflex Testing Yes if ASCUS     Previous Abnormal?       No      Performing Labs       The technical component of this testing was completed at [facility]         If you have any questions or concerns, please call the clinic at the number listed above.       Sincerely,      Agustina Galvan MD

## 2023-09-21 NOTE — NURSING NOTE
Prior to immunization administration, verified patients identity using patient s name and date of birth. Please see Immunization Activity for additional information.     Screening Questionnaire for Adult Immunization    Are you sick today?   No   Do you have allergies to medications, food, a vaccine component or latex?   No   Have you ever had a serious reaction after receiving a vaccination?   No   Do you have a long-term health problem with heart, lung, kidney, or metabolic disease (e.g., diabetes), asthma, a blood disorder, no spleen, complement component deficiency, a cochlear implant, or a spinal fluid leak?  Are you on long-term aspirin therapy?   No   Do you have cancer, leukemia, HIV/AIDS, or any other immune system problem?   No   Do you have a parent, brother, or sister with an immune system problem?   No   In the past 3 months, have you taken medications that affect  your immune system, such as prednisone, other steroids, or anticancer drugs; drugs for the treatment of rheumatoid arthritis, Crohn s disease, or psoriasis; or have you had radiation treatments?   No   Have you had a seizure, or a brain or other nervous system problem?   No   During the past year, have you received a transfusion of blood or blood    products, or been given immune (gamma) globulin or antiviral drug?   No   For women: Are you pregnant or is there a chance you could become       pregnant during the next month?   No   Have you received any vaccinations in the past 4 weeks?   No     Immunization questionnaire answers were all negative.      Patient instructed to remain in clinic for 15 minutes afterwards, and to report any adverse reactions.     Screening performed by Arnie Kirkpatrick on 9/21/2023 at 3:11 PM.

## 2023-09-21 NOTE — PROGRESS NOTES
"   SUBJECTIVE:   CC: Dipti is an 22 year old who presents for preventive health visit.       9/21/2023     2:16 PM   Additional Questions   Roomed by jony   Accompanied by self       HPI    \"Feels like the cervix is not normal\" \"feels open\". No pain.     Vaginal discharge is the same, no improvement after the flagyl.  Uses soap and area regularly.    Not on birth control, not having intercourse at this time, does not want birth control.      Social History     Tobacco Use    Smoking status: Never    Smokeless tobacco: Never   Substance Use Topics    Alcohol use: Never              No data to display              Reviewed orders with patient.  Reviewed health maintenance and updated orders accordingly - Yes      History of abnormal Pap smear: NO - age 21-29 PAP every 3 years recommended     Reviewed and updated as needed this visit by clinical staff   Tobacco  Allergies  Meds              Reviewed and updated as needed this visit by Provider                     Review of Systems  As above     OBJECTIVE:   BP 99/62   Pulse 71   Temp 98.1  F (36.7  C) (Oral)   Resp 20   Ht 1.481 m (4' 10.3\")   Wt 50.8 kg (112 lb)   LMP 08/02/2023 (Exact Date)   SpO2 100%   BMI 23.17 kg/m    Physical Exam  Genitourinary:     Pubic Area: No rash.       Urethra: No prolapse or urethral swelling.      Vagina: Normal. No vaginal discharge or tenderness.      Cervix: Erythema present.     GENERAL: healthy, alert and no distress  EYES: Eyes grossly normal to inspection, PERRL and conjunctivae and sclerae normal  HENT: ear canals and TM's normal, nose and mouth without ulcers or lesions  NECK: no adenopathy, no asymmetry, masses, or scars and thyroid normal to palpation  RESP: lungs clear to auscultation - no rales, rhonchi or wheezes  CV: regular rate and rhythm, normal S1 S2, no S3 or S4, no murmur, click or rub, no peripheral edema and peripheral pulses strong  ABDOMEN: soft, nontender, no hepatosplenomegaly, no masses and bowel " sounds normal  MS: no gross musculoskeletal defects noted, no edema  SKIN: no suspicious lesions or rashes  NEURO: Normal strength and tone, mentation intact and speech normal  PSYCH: mentation appears normal, affect normal/bright    ASSESSMENT/PLAN:       1. Cervical cancer screening  Some erythema noted at os.   - Pap Screen only - recommended age 21 - 24 years    2. Routine general medical examination at a health care facility  3. Vaginal discharge  Healthy 24-year-old female.  Gave birth 6 months ago and is overall healing well.  Does note ongoing change in vaginal discharge which was not improved since last visit.  Reviewed labs from that visit which were all negative for infection.  Patient does not continue soap in this area.  Not on birth control, not wanting birth control, practicing abstinence at this time.  -Education provided around not using soaps or fragrances around vagina.  If not improving in the next few months can discuss  -Education around importance of healing, time between pregnancies  -Patient prefers calls with all results including normal  -Deferring vaccines at this time    COUNSELING:  Reviewed preventive health counseling, as reflected in patient instructions        She reports that she has never smoked. She has never used smokeless tobacco.          Agustina Galvan MD  [facility]

## 2023-09-26 LAB
BKR LAB AP GYN ADEQUACY: NORMAL
BKR LAB AP GYN INTERPRETATION: NORMAL
BKR LAB AP HPV REFLEX: NORMAL
BKR LAB AP PREVIOUS ABNORMAL: NORMAL
PATH REPORT.COMMENTS IMP SPEC: NORMAL
PATH REPORT.COMMENTS IMP SPEC: NORMAL
PATH REPORT.RELEVANT HX SPEC: NORMAL

## 2023-10-10 DIAGNOSIS — N92.6 MISSED PERIOD: Primary | ICD-10-CM

## 2023-10-12 ENCOUNTER — OFFICE VISIT (OUTPATIENT)
Dept: FAMILY MEDICINE | Facility: CLINIC | Age: 23
End: 2023-10-12
Payer: COMMERCIAL

## 2023-10-12 VITALS
WEIGHT: 111.8 LBS | SYSTOLIC BLOOD PRESSURE: 120 MMHG | DIASTOLIC BLOOD PRESSURE: 80 MMHG | OXYGEN SATURATION: 98 % | BODY MASS INDEX: 23.13 KG/M2 | TEMPERATURE: 98.2 F | HEART RATE: 83 BPM

## 2023-10-12 DIAGNOSIS — Z32.01 PREGNANCY TEST POSITIVE: Primary | ICD-10-CM

## 2023-10-12 DIAGNOSIS — N92.6 MISSED PERIOD: ICD-10-CM

## 2023-10-12 DIAGNOSIS — L85.3 DRY SKIN: ICD-10-CM

## 2023-10-12 LAB — HCG UR QL: POSITIVE

## 2023-10-12 PROCEDURE — 99213 OFFICE O/P EST LOW 20 MIN: CPT | Mod: GC

## 2023-10-12 PROCEDURE — 81025 URINE PREGNANCY TEST: CPT

## 2023-10-12 RX ORDER — PRENATAL VIT/IRON FUM/FOLIC AC 27MG-0.8MG
1 TABLET ORAL DAILY
Qty: 90 TABLET | Refills: 3 | Status: SHIPPED | OUTPATIENT
Start: 2023-10-12

## 2023-10-12 RX ORDER — DIAPER,BRIEF,INFANT-TODD,DISP
EACH MISCELLANEOUS 2 TIMES DAILY
Qty: 28 G | Refills: 0 | Status: SHIPPED | OUTPATIENT
Start: 2023-10-12 | End: 2024-01-08

## 2023-10-12 NOTE — PROGRESS NOTES
Assessment & Plan     Missed period   with LMP 23. No vaginal bleeding since. Positive home test 2 weeks ago.   - HCG qualitative urine    Pregnancy test positive  UPT resulted positive in clinic. Prescribed PNV and counseled patient on no alcohol, advil/ibuprofen use, or deli meats. Will follow up with early dating U/S clinic and then first OB visit for lab work.   - Prenatal Vit-Fe Fumarate-FA (PRENATAL MULTIVITAMIN W/IRON) 27-0.8 MG tablet  Dispense: 90 tablet; Refill: 3    Dry skin  Dry skin that is only mildly intermittently itchy on her right 5th finger. Has tried some lotions at home but no barrier creams. Will trial the cream below and follow up at upcoming OB visits.   - hydrocortisone (CORTAID) 1 % external ointment  Dispense: 28 g; Refill: 0      Return in about 4 weeks (around 2023) for early U/S dating.    Ara Ashby DO PGY2  [facility]    I discussed this patient with attending physician Dr. Reid who agrees with my assessment and plan.       Shirley Resendez is a 22 year old, presenting for the following health issues:  Pregnancy Test (Positive home pregnancy test ) and Skin Check (Very dry skin on right pinky. Usually happen during pregnancy only. )        10/12/2023     3:16 PM   Additional Questions   Roomed by kan   Accompanied by        HPI        who recently delivered a healthy  girl via  on 3/13/23. They were trying to conceive and having sexual intercourse without contraception. Patient's LMP started 22 and was regular in blood loss and duration. No vaginal bleeding since. Home urine pregnancy test was positive 2 weeks ago She is feeling well and denies nausea or vomiting today. Was not recently using alcohol, is not regularly prescribed any meds that are unsafe during pregnancy, and denies drug use. Not currently taking prenatal vitamins.           Objective    /80 (BP Location: Left arm, Patient Position:  Sitting, Cuff Size: Adult Regular)   Pulse 83   Temp 98.2  F (36.8  C) (Oral)   Wt 50.7 kg (111 lb 12.8 oz)   LMP 08/02/2023 (Exact Date)   SpO2 98%   Breastfeeding No   BMI 23.13 kg/m    Body mass index is 23.13 kg/m .  Physical Exam   GENERAL: healthy, alert and no distress  EYES: Eyes grossly normal to inspection, conjunctivae and sclerae normal  RESP: breathing comfortably on room air, no increased work of breathing  MS: no gross musculoskeletal defects noted, no edema  SKIN: no suspicious lesions or rashes on visible skin  NEURO: Normal strength and tone, mentation intact and speech normal    Results for orders placed or performed in visit on 10/12/23 (from the past 24 hour(s))   HCG qualitative urine   Result Value Ref Range    hCG Urine Qualitative Positive (A) Negative       ----- Service Performed and Documented by Resident or Fellow ------

## 2023-10-12 NOTE — PATIENT INSTRUCTIONS
Congratulations on your pregnancy!    - no alcohol, ibuprofen or advil, or deli meat while pregnant.  - remember to  the prenatal vitamins I prescribed today and take them every day!  - all the medications you are currently on are safe in pregnancy, feel free to call the clinic if you are thinking of starting any new medications to see if they are safe. Tums and tylenol are both safe!   - follow up for your ultrasound visit as scheduled for pregnancy dating.

## 2023-10-27 ENCOUNTER — TELEPHONE (OUTPATIENT)
Dept: FAMILY MEDICINE | Facility: CLINIC | Age: 23
End: 2023-10-27
Payer: COMMERCIAL

## 2023-10-27 ENCOUNTER — APPOINTMENT (OUTPATIENT)
Dept: INTERPRETER SERVICES | Facility: CLINIC | Age: 23
End: 2023-10-27
Payer: COMMERCIAL

## 2023-10-27 DIAGNOSIS — Z32.01 PREGNANCY EXAMINATION OR TEST, POSITIVE RESULT: Primary | ICD-10-CM

## 2023-10-27 NOTE — TELEPHONE ENCOUNTER
LMTCC with Nahed (I) to help pt cancel her appt with Dr Griffin on 11/16/23 as she will be too far along for dating u/s by that time.  Pt needs to schedule dating u/s now and can schedule NOB with Dr Ashby./Corazon Singh RN BSN

## 2023-10-27 NOTE — TELEPHONE ENCOUNTER
Pt's  returned the call and told him that appt on 11/16/23 will be cancelled.  Assisted him with scheduling dating u/s for Monday, 10/30/23 at 3:00 PM.  Told him that after the appt the nurse will assist his wife with scheduling first prenatal appt with her doctor.  He verbalized understanding and denies any questions or concerns./Corazon Singh RN BSN

## 2023-10-30 ENCOUNTER — ANCILLARY PROCEDURE (OUTPATIENT)
Dept: ULTRASOUND IMAGING | Facility: CLINIC | Age: 23
End: 2023-10-30
Attending: STUDENT IN AN ORGANIZED HEALTH CARE EDUCATION/TRAINING PROGRAM
Payer: COMMERCIAL

## 2023-10-30 DIAGNOSIS — Z32.01 PREGNANCY EXAMINATION OR TEST, POSITIVE RESULT: ICD-10-CM

## 2023-10-30 PROCEDURE — 76801 OB US < 14 WKS SINGLE FETUS: CPT | Mod: TC | Performed by: RADIOLOGY

## 2023-10-30 NOTE — NURSING NOTE
Due to patient being non-English speaking/uses sign language, an  was used for this visit. Only for face-to-face interpretation by an external agency, date and length of interpretation can be found on the scanned worksheet.     name: Talisha #[id number]  Agency: [employer]  Language: Nahed   Telephone number: [phone]  Type of interpretation: Telephone, spoken

## 2023-11-13 ENCOUNTER — ALLIED HEALTH/NURSE VISIT (OUTPATIENT)
Dept: FAMILY MEDICINE | Facility: CLINIC | Age: 23
End: 2023-11-13
Payer: COMMERCIAL

## 2023-11-13 ENCOUNTER — OFFICE VISIT (OUTPATIENT)
Dept: FAMILY MEDICINE | Facility: CLINIC | Age: 23
End: 2023-11-13
Payer: COMMERCIAL

## 2023-11-13 VITALS
SYSTOLIC BLOOD PRESSURE: 99 MMHG | HEART RATE: 84 BPM | OXYGEN SATURATION: 97 % | WEIGHT: 114.2 LBS | DIASTOLIC BLOOD PRESSURE: 67 MMHG | BODY MASS INDEX: 23.97 KG/M2 | RESPIRATION RATE: 18 BRPM | HEIGHT: 58 IN | TEMPERATURE: 98.2 F

## 2023-11-13 DIAGNOSIS — Z34.81 ENCOUNTER FOR SUPERVISION OF OTHER NORMAL PREGNANCY IN FIRST TRIMESTER: Primary | ICD-10-CM

## 2023-11-13 DIAGNOSIS — O09.90 SUPERVISION OF HIGH RISK PREGNANCY, ANTEPARTUM: ICD-10-CM

## 2023-11-13 DIAGNOSIS — Z60.3 LANGUAGE BARRIER, CULTURAL DIFFERENCES: ICD-10-CM

## 2023-11-13 DIAGNOSIS — O09.899 SHORT INTERVAL BETWEEN PREGNANCIES AFFECTING PREGNANCY, ANTEPARTUM: Primary | ICD-10-CM

## 2023-11-13 LAB
ABO/RH(D): NORMAL
ANTIBODY SCREEN: NEGATIVE
CLUE CELLS: ABNORMAL
ERYTHROCYTE [DISTWIDTH] IN BLOOD BY AUTOMATED COUNT: 13.1 % (ref 10–15)
HCT VFR BLD AUTO: 33.5 % (ref 35–47)
HGB BLD-MCNC: 11.8 G/DL (ref 11.7–15.7)
MCH RBC QN AUTO: 30.4 PG (ref 26.5–33)
MCHC RBC AUTO-ENTMCNC: 35.2 G/DL (ref 31.5–36.5)
MCV RBC AUTO: 86 FL (ref 78–100)
PLATELET # BLD AUTO: 200 10E3/UL (ref 150–450)
RBC # BLD AUTO: 3.88 10E6/UL (ref 3.8–5.2)
SPECIMEN EXPIRATION DATE: NORMAL
SPECIMEN EXPIRATION DATE: NORMAL
TRICHOMONAS, WET PREP: ABNORMAL
WBC # BLD AUTO: 9.3 10E3/UL (ref 4–11)
WBC'S/HIGH POWER FIELD, WET PREP: ABNORMAL
YEAST, WET PREP: ABNORMAL

## 2023-11-13 PROCEDURE — 99000 SPECIMEN HANDLING OFFICE-LAB: CPT

## 2023-11-13 PROCEDURE — 87340 HEPATITIS B SURFACE AG IA: CPT

## 2023-11-13 PROCEDURE — 86780 TREPONEMA PALLIDUM: CPT

## 2023-11-13 PROCEDURE — 87491 CHLMYD TRACH DNA AMP PROBE: CPT

## 2023-11-13 PROCEDURE — 99207 PR FIRST OB VISIT: CPT | Mod: GC

## 2023-11-13 PROCEDURE — 86706 HEP B SURFACE ANTIBODY: CPT

## 2023-11-13 PROCEDURE — 86762 RUBELLA ANTIBODY: CPT

## 2023-11-13 PROCEDURE — 83655 ASSAY OF LEAD: CPT | Mod: 90

## 2023-11-13 PROCEDURE — 85027 COMPLETE CBC AUTOMATED: CPT

## 2023-11-13 PROCEDURE — 86901 BLOOD TYPING SEROLOGIC RH(D): CPT

## 2023-11-13 PROCEDURE — 99207 PR NO BILLABLE SERVICE THIS VISIT: CPT

## 2023-11-13 PROCEDURE — 86850 RBC ANTIBODY SCREEN: CPT

## 2023-11-13 PROCEDURE — 87389 HIV-1 AG W/HIV-1&-2 AB AG IA: CPT

## 2023-11-13 PROCEDURE — 36415 COLL VENOUS BLD VENIPUNCTURE: CPT

## 2023-11-13 PROCEDURE — 87210 SMEAR WET MOUNT SALINE/INK: CPT

## 2023-11-13 PROCEDURE — 86900 BLOOD TYPING SEROLOGIC ABO: CPT

## 2023-11-13 PROCEDURE — 87591 N.GONORRHOEAE DNA AMP PROB: CPT

## 2023-11-13 PROCEDURE — 87086 URINE CULTURE/COLONY COUNT: CPT

## 2023-11-13 SDOH — SOCIAL STABILITY - SOCIAL INSECURITY: ACCULTURATION DIFFICULTY: Z60.3

## 2023-11-13 NOTE — PROGRESS NOTES
Past Medical History     Do you have a history of any of the following medical conditions?    Condition No/Yes/Details   Hypertension No    Heart disease, mitral valve prolapse, rheumatic fever No    Asthma or another chronic lung disease No    An autoimmune disorder No    Kidney disease No    Frequent urinary tract infections No    Epilepsy, seizures, or spells No    Migraine headaches No    Stroke, loss of sensation/function, seizures, or other neuro problem No    Diabetes No    Thyroid problems or have you taken thyroid medication No    Hepatitis, liver disease, jaundice No    Blood clots, phlebitis, pulmonary embolism or varicose veins No    Excessive bleeding after surgery or dental work No    Do you have more bleeding than other women after a cut or a scratch? No    Anemia No    Blood transfusions No         Would you refuse a blood transfusion?       No   If yes, then ask next question. If no, skip next question.   Would you rather die than receive a blood transfusion? No    Breast problems No    Have you ever ? No    Abnormalities of the uterus No    Abnormal pap smear No    Have you ever been treated for depression? No    Are you having problems with crying spells or loss of self-esteem? No    Have you ever required psychiatric care? No    Have you been physically, sexually, or emotionally hurt by someone? No    Have you been in a major accident or suffered serious trauma? No    Have you ever had any gynecological surgical procedures such as cervical conization, LEEP, laser treatment, cryosurgery of the cervix, or a dilatation and curettage? No    Have you had any other surgical procedures? No         Have you ever had any complications from anesthesia? No    Have you ever been hospitalized for a nonsurgical reason? No             Substance use and exposure     Does anyone in your home smoke? No    Do you use tobacco products or betel nut? No    Do you drink beer, wine, or hard liquor? No     Do you use any of the following: marijuana, speed, cocaine, heroin, hallucinogens, or other drugs? No               Symptoms since Last Menstrual Period     Do you currently have any of the following symptoms: No/Yes/Details        Abdominal pain No         Blood in the stools or urine No         Chest pain No         Shortness of breath  YES SOB occasionally during this pregnancy        coughing or vomiting up blood No         Your heart racing or skipping beats No         Nausea or vomiting  YES getting better        Pain on urination No         Vaginal discharge or bleeding  YES yellow vaginal discharge with odor               Genetic Screening          Is the patient 35 years or older? No      Do you have a history of any of the following No/Yes/Details        A metabolic disorder (e.g. Insulin-dependent DM, PKU) No         Recurrent pregnancy loss or still birth No      Do you, the baby's father, or anyone in your families have          Thalassemia AND MCV <80 No         Hemophilia No         Neural tube defect No }        Congenital heart defect No         Sickle cell disease or trait No         Muscular dystrophy No         Cystic fibrosis No         Mental retardation or autism No         Down's syndrome No         Ric-Sach's disease No         Clinton's chorea No         Any other inherited genetic or chromosomal disorder No         A child with birth defects not listed above No                Infection History     Ever treated for tuberculosis or had a positive skin test No    Ever had genital herpes (or has your partner) No    Had a rash or viral illness since LMP  YES rash on rt pinkie finger   Ever had a sexually transmitted infection No    Ever had chicken pox or the vaccine  YES immune by titer   Have you had a sexual partner who is HIV positive No    Ever had any other serious infectious disease No                Risk Assessment     Average Risk Category  No significant risk factors:  Yes    At Risk Category (up to 3)  Teen pregnancy: No  Poor social situation: No  Domestic abuse: No  Financial difficulties: No  Smoker: No  H/O  deliver: No  H/O drug abuse: No  Non-English speaking: Yes  Advanced maternal age: No  GDM risks: No  Previous C/S: No  H/O PIH: No  H/O STIs: No  H/O mental health concerns: No  Onset care > 20 weeks: No  Other: short interval between pregnancies    High Risk Category (4 or more At Risk or)  Diabetes/GDM: No  Multiple gestation: No  Chronic hypertension: No  Significant hx of asthma: No  Fetal demise > 20 weeks: No  Positive tox screen: No  Current mental health treatment: No      Risk: At Risk   Date determined: 23

## 2023-11-13 NOTE — PROGRESS NOTES
First Obstetric Visit           Assessment and Plan       Dipti Kirkpatrick is a 23 year old , at 14w5d of pregnancy with ROQUE of May 8, 2024 by LMP.  Patient's history is high risk for the following reasons: short interval pregnancy (but >6 mos.) and non-English speaking.       # Other concerns (medical problems, high risk surgical/obstetric history, high risk social situation, etc):   - In addition to routine prenatal care, patient will need wet prep today, hep B surface Ab to detect immunity following recent hep B vaccine series.  - Problem list reviewed and updated.    # Additional Pregnancy Risk Assessment: Average risk pregnancy    - Reviewed early screening for gestational diabetes. The patient does not have a history of GDM, physically in active with BMI>23 in [place] />25 in all other populations, physically active with BMI>30, h/o prediabetes/glucose intolerance, first degree relative with GDM or DM, or chronic HTN, so WILL NOT obtain early GCT or A1c.    The patient does not have a history of:   Any one of the following high risk factors:  Pregestational DM1 or DM2  Chronic HTN  Autoimmune dz (Eg SLE, APLS)   H/o Preeclampsia in prior pregnancy  Multifetal gestation  6.   Renal Disease     Consider for two or more of the following moderate risk factors:  Nulliparity or > 10 years since last birth  Obesity (BMI > 30)  H/o preeclampsia in mother or sister  Age ? 35 years  Socio-demographic characteristics (low socioeconomic status, self-identified Black/ race)  Personal Hx of factors (prior SGA/low birthweight, prior adverse outcome: stillborn)  so WILL NOT start low dose aspirin (81mg) at 12-28 weeks (ideally 12-16 weeks) to prevent preeclampsia.    - The patient  does not have a history of spontaneous  birth so  WILL NOT consider progesterone starting at 16-20 weeks and/or serial transvaginal cervical length ultrasounds from 16-24 weeks.    # General prenatal care  management:  - Dating US obtained and dating confirmed? Yes   - Taking PNV/Folate? Yes   - Prenatal vitamins ordered.  - Ordered new OB labs: CBC, blood type and antibody screen, HIV, VDRL, hep B sAg, rubella, UA/UC, gonorrhea/chlamydia, Hepatitis B immunity and Wet prep done today.  - Flu shot offered and was Declined.  - Discussed genetic screening. Patient does not want to pursue screening.  - Discussed screening for sickle cell anemia. Patient does not  want to pursue Hb electrophoresis.     # Counseling given:   - Follow up in 4 weeks for return OB visit.  - Recommended weight gain for pregnancy: 25-35 lbs (pregravid BMI 18.5-24.9)  - Instructed on best evidence for: healthy diet and foods to avoid; exercise and activity during pregnancy; avoiding exposure to toxoplasmosis; safe use of seatbelts during pregnancy; and maintenance of a generally healthy lifestyle  - Patient to see OB educator/ RN today and/or next visit.  - Discussed the harms, benefits, side effects and alternative therapies for current prescribed and OTC medications.     Dipti was seen today for prenatal care.    Diagnoses and all orders for this visit:    Encounter for supervision of other normal pregnancy in first trimester  -     ABO/Rh Type-HML; Future  -     Antibody Screen; Future  -     CBC with Plt; Future  -     Culture Urine; Future  -     Hepatitis B Surface Ag; Future  -     HIV Ag/Ab Screen Cascade; Future  -     Lead, Blood; Future  -     Rubella Antibody IgG; Future  -     Syphilis Screen Cascade; Future  -     Chlamydia trachomatis PCR  URINE; Future  -     Neisseria gonorrhoeae PCR  URINE; Future  -     Hepatitis B Surface Ab; Future  -     Wet Prep  -     Neisseria gonorrhoeae PCR  URINE  -     Chlamydia trachomatis PCR  URINE  -     Hepatitis B Surface Ab  -     Syphilis Screen Cascade  -     Rubella Antibody IgG  -     Lead, Blood  -     HIV Ag/Ab Screen Cascade  -     Hepatitis B Surface Ag  -     Culture Urine  -     CBC  with Plt  -     Antibody Screen  -     ABO/Rh Type-HML        Options for treatment and follow-up care were reviewed with the patient and/or guardian. Dipti Kirkpatrick and/or guardian engaged in the decision making process and verbalized understanding of the options discussed and agreed with the final plan.    Ara Ashby DO PGY2    I discussed this patient with attending physician Dr. Perkins.          AFSHIN Kirkpatrick is a 23 year old woman who presents for an initial prenatal visit at Unknown weeks of pregnancy with ROQUE of May 8, 2024 by LMP of Patient's last menstrual period was 2023 (exact date)..      She has not had bleeding since her LMP.   She has had mild nausea.   Weight loss has not occurred.    This was a planned pregnancy.     OTHER CONCERNS: vaginal discharge, see below              Labor Risk Assessment     Is the patient's age <18 or >40?     No  Patint's BMI is Body mass index is 23.65 kg/m .   Does patient have a BMI < 18.5?     No  Prior delivery within 6 months?      No  Ever delivered prior to 37 weeks gestation?  No  Pregnancy occur via In Vitro Fertilization?   No  Are you carrying twins?       No    The patient has the following additional risk factors for  labor: none, short interval pregnancy but >6 mos.      Labor Risk Summary:  Pt is high risk for  Labor  No               Past Medical History     Past Medical History:   Diagnosis Date    Known health problems: none      See nurse history note, reviewed in detail.             Current Medications      Current Outpatient Medications   Medication Sig Dispense Refill    Prenatal Vit-Fe Fumarate-FA (PRENATAL MULTIVITAMIN W/IRON) 27-0.8 MG tablet Take 1 tablet by mouth daily 90 tablet 3    acetaminophen (TYLENOL) 325 MG tablet Take 2 tablets (650 mg) by mouth every 4 hours as needed for mild pain (Patient not taking: Reported on 2023) 180 tablet 0    benzocaine-menthol (DERMOPLAST) 20-0.5 % AERO Apply  "1 g topically 4 times daily as needed (perineal pain) (Patient not taking: Reported on 4/21/2023) 85 g 0    hydrocortisone (CORTAID) 1 % external ointment Apply topically 2 times daily (Patient not taking: Reported on 11/13/2023) 28 g 0             Review of Systems      ROS:  No - Headache  No - Changes in vision  No - Chest Pain  YES - Shortness of Breath, mild and intermittent, occurs at rest   No - Nausea   No - Vomiting  No - Abdominal pain   No - Contractions  No - Dysuria   YES - Vaginal Discharge, yellow/gray for 6 months now (since last delivery) with new odor  No - Vaginal bleeding   No - Loss of Fluid   No - Extremity swelling     ====================================================           Physical Exam      BP 99/67 (BP Location: Right arm, Patient Position: Sitting, Cuff Size: Adult Regular)   Pulse 84   Temp 98.2  F (36.8  C) (Oral)   Resp 18   Ht 1.48 m (4' 10.27\")   Wt 51.8 kg (114 lb 3.2 oz)   LMP 08/02/2023 (Exact Date)   SpO2 97%   BMI 23.65 kg/m    GENERAL: healthy, alert and no distress  NECK: no tenderness, no adenopathy, no asymmetry, no masses, no stiffness; thyroid- normal to palpation  RESP: lungs clear to auscultation - no rales, no rhonchi, no wheezes  CV: regular rates and rhythm, normal S1 S2, no S3 or S4 and no murmur, no click or rub -  ABDOMEN: soft, no tenderness, no hepatosplenomegaly, no masses, normal bowel sounds  MS: extremities- no gross deformities noted, no edema  SKIN: no suspicious lesions, no rashes.     Past labs reviewed:  B POS  Varicella immune Yes  Hepatitis B immune  unknown, recently completed series, will collect titer today.   Last pap 9/21/23.  She is not due for this today.    =========================================   "

## 2023-11-14 LAB
C TRACH DNA SPEC QL NAA+PROBE: NEGATIVE
HBV SURFACE AB SERPL IA-ACNC: 13.68 M[IU]/ML
HBV SURFACE AB SERPL IA-ACNC: REACTIVE M[IU]/ML
HBV SURFACE AG SERPL QL IA: NONREACTIVE
HIV 1+2 AB+HIV1 P24 AG SERPL QL IA: NONREACTIVE
N GONORRHOEA DNA SPEC QL NAA+PROBE: NEGATIVE
RUBV IGG SERPL QL IA: 9.37 INDEX
RUBV IGG SERPL QL IA: POSITIVE
T PALLIDUM AB SER QL: NONREACTIVE

## 2023-11-15 ENCOUNTER — APPOINTMENT (OUTPATIENT)
Dept: INTERPRETER SERVICES | Facility: CLINIC | Age: 23
End: 2023-11-15
Payer: COMMERCIAL

## 2023-11-15 LAB
BACTERIA UR CULT: NORMAL
LEAD BLDV-MCNC: <2 UG/DL

## 2023-11-21 PROBLEM — Z01.84 LACK OF IMMUNITY TO HEPATITIS B VIRUS DEMONSTRATED BY SEROLOGIC TEST: Status: RESOLVED | Noted: 2022-09-07 | Resolved: 2023-11-21

## 2023-11-21 PROBLEM — O09.90 SUPERVISION OF HIGH RISK PREGNANCY, ANTEPARTUM: Status: ACTIVE | Noted: 2023-11-13

## 2023-11-21 PROBLEM — O09.899 SHORT INTERVAL BETWEEN PREGNANCIES AFFECTING PREGNANCY, ANTEPARTUM: Status: ACTIVE | Noted: 2023-11-13

## 2023-11-21 PROBLEM — Z60.3 LANGUAGE BARRIER, CULTURAL DIFFERENCES: Status: ACTIVE | Noted: 2023-11-13

## 2023-11-21 NOTE — PROGRESS NOTES
Family Medicine OB Education    I provided the following OB education to Dipti Kirkpatrick.    Discussed that Dr Ashby should be the doctor that she sees for her prenatal visits and that provider will try hard to be the one to deliver her baby.  Discussed that if primary provider was unavailable that one of our other physicians would deliver the baby and that this could be a male or female provider.  Briefly discussed residency program and that multiple doctors would be present at time of delivery.  Sheet given and discussed fetal growth and development.  Sheet given and discussed warning signs with reasons to call clinic or L&D with questions or concerns (phone numbers given).  Sheet given and discussed Tdap to be given after 27 weeks gestation and the importance of family members get immunized before delivery.  Proof of pregnancy completed and given to pt.  Gave pt preregistration form for hospital to complete.  See questionnaire and pregnancy risk assessment for further information in provider encounter.       Name of provider who requested the OB education: Dr Ashby  Name of provider on site (faculty or community preceptor) at the time of performing the OB education: Dr Gregorio Singh, RN, BSN

## 2023-12-13 NOTE — PROGRESS NOTES
Assessment & Plan  Dipti Kirkpatrick is a 23 year old , at 19w1d weeks of pregnancy with ROQUE of May 8, 2024 by LMP.  Patient's history is high risk for the following reasons: short interval pregnancy and non-English speaking.     # High risk concerns (medical problems, high risk surgical/obstetric history, high risk social situation, etc):   - In addition to routine prenatal care, patient will need A1c for early diabetes screening, order for comprehensive fetal survey, and belly brace for new-onset round ligament pain.  - Patient was offered COVID and flu vaccine and assured that they are safe to administer in pregnancy, patient and family continue to decline today.   - Reviewed patient's inadequate weight gain today, recommended small, frequent meals high in protein.   - Problem list reviewed and updated.    Dipti was seen today for prenatal care.    Diagnoses and all orders for this visit:    Normal pregnancy in multigravida in second trimester  -     Hemoglobin A1c; Future  -     OB/Maternity Back Brace for DME - ONLY FOR DME  -     Hemoglobin A1c  -     US OB > 14 Weeks; Future    Language barrier, cultural differences    Short interval between pregnancies affecting pregnancy, antepartum    Round ligament pain  -     OB/Maternity Back Brace for DME - ONLY FOR DME        Weight gain inadequate: 2.268 kg (5 lb) to date, out of recommended total of 25-35 lbs (pregravid BMI 18.5-24.9)      Return to clinic in 4 weeks.    Ara Ashby DO PGY2  I precepted today with Szui Robison MD.        Subjective  Concerns: HA, intermittent flank pain    ROS:  YES - Headache, Left sided unilateral tension HA that existed prior to pregnancy and responds well to tylenol  No - Changes in vision  No - Chest Pain  No - Shortness of Breath  No - Nausea   No - Vomiting  YES - Abdominal pain, new-onset intermittent low flank pain consistent with round ligament pain   No - Contractions  No - Dysuria   No - Vaginal Discharge   "  No - Vaginal bleeding   No - Loss of Fluid   No - Extremity swelling   Absent - Fetal movement       Patient Active Problem List   Diagnosis    Language barrier, cultural differences    Mass of right axilla    Short interval between pregnancies affecting pregnancy, antepartum    Supervision of high risk pregnancy, antepartum       Dipti Kirkpatrick speaks Hmong so an  was used today.    Guidance:  signs of miscarriage  OTC medications  weight gain and exercise  quickening  fetal survey ultrasound    Going to [place]? [place]      Objective  BP 99/62 (BP Location: Left arm, Patient Position: Sitting, Cuff Size: Adult Regular)   Pulse 68   Temp 98.9  F (37.2  C) (Oral)   Resp 18   Ht 1.474 m (4' 10.03\")   Wt 53.1 kg (117 lb)   LMP 08/02/2023 (Exact Date)   SpO2 99%   BMI 24.43 kg/m    No distress.  Gravid abdomen.  .  Fundal height 21 cm.  no edema.    Results  Blood type: B POS  Results for orders placed or performed in visit on 12/14/23   Hemoglobin A1c     Status: Normal   Result Value Ref Range    Hemoglobin A1C 4.9 0.0 - 5.6 %      "

## 2023-12-14 ENCOUNTER — OFFICE VISIT (OUTPATIENT)
Dept: FAMILY MEDICINE | Facility: CLINIC | Age: 23
End: 2023-12-14
Payer: COMMERCIAL

## 2023-12-14 VITALS
HEART RATE: 68 BPM | WEIGHT: 117 LBS | TEMPERATURE: 98.9 F | OXYGEN SATURATION: 99 % | RESPIRATION RATE: 18 BRPM | DIASTOLIC BLOOD PRESSURE: 62 MMHG | HEIGHT: 58 IN | BODY MASS INDEX: 24.56 KG/M2 | SYSTOLIC BLOOD PRESSURE: 99 MMHG

## 2023-12-14 DIAGNOSIS — N94.9 ROUND LIGAMENT PAIN: ICD-10-CM

## 2023-12-14 DIAGNOSIS — O09.899 SHORT INTERVAL BETWEEN PREGNANCIES AFFECTING PREGNANCY, ANTEPARTUM: ICD-10-CM

## 2023-12-14 DIAGNOSIS — Z34.82 NORMAL PREGNANCY IN MULTIGRAVIDA IN SECOND TRIMESTER: Primary | ICD-10-CM

## 2023-12-14 DIAGNOSIS — Z60.3 LANGUAGE BARRIER, CULTURAL DIFFERENCES: ICD-10-CM

## 2023-12-14 LAB — HBA1C MFR BLD: 4.9 % (ref 0–5.6)

## 2023-12-14 PROCEDURE — 83036 HEMOGLOBIN GLYCOSYLATED A1C: CPT

## 2023-12-14 PROCEDURE — 36415 COLL VENOUS BLD VENIPUNCTURE: CPT

## 2023-12-14 PROCEDURE — 99207 PR PRENATAL VISIT: CPT | Mod: GC

## 2023-12-14 SDOH — SOCIAL STABILITY - SOCIAL INSECURITY: ACCULTURATION DIFFICULTY: Z60.3

## 2023-12-14 NOTE — PROGRESS NOTES
"Preceptor attestation:  Vital signs reviewed: BP 99/62 (BP Location: Left arm, Patient Position: Sitting, Cuff Size: Adult Regular)   Pulse 68   Temp 98.9  F (37.2  C) (Oral)   Resp 18   Ht 1.474 m (4' 10.03\")   Wt 53.1 kg (117 lb)   LMP 08/02/2023 (Exact Date)   SpO2 99%   BMI 24.43 kg/m      Patient seen, evaluated, and discussed with the resident.  I verified the content of the note, which accurately reflects my assessment of the patient and the plan of care.    Supervising physician: Suzi Robison MD  [facility]  "

## 2024-01-08 ENCOUNTER — OFFICE VISIT (OUTPATIENT)
Dept: FAMILY MEDICINE | Facility: CLINIC | Age: 24
End: 2024-01-08
Payer: COMMERCIAL

## 2024-01-08 ENCOUNTER — ANCILLARY PROCEDURE (OUTPATIENT)
Dept: ULTRASOUND IMAGING | Facility: CLINIC | Age: 24
End: 2024-01-08
Attending: FAMILY MEDICINE
Payer: COMMERCIAL

## 2024-01-08 VITALS
TEMPERATURE: 97.9 F | WEIGHT: 120.6 LBS | SYSTOLIC BLOOD PRESSURE: 101 MMHG | RESPIRATION RATE: 20 BRPM | HEART RATE: 76 BPM | OXYGEN SATURATION: 100 % | HEIGHT: 58 IN | DIASTOLIC BLOOD PRESSURE: 66 MMHG | BODY MASS INDEX: 25.31 KG/M2

## 2024-01-08 DIAGNOSIS — Z34.82 NORMAL PREGNANCY IN MULTIGRAVIDA IN SECOND TRIMESTER: Primary | ICD-10-CM

## 2024-01-08 DIAGNOSIS — Z60.3 LANGUAGE BARRIER, CULTURAL DIFFERENCES: ICD-10-CM

## 2024-01-08 DIAGNOSIS — O36.5990 FETAL GROWTH RESTRICTION ANTEPARTUM: ICD-10-CM

## 2024-01-08 DIAGNOSIS — O09.899 SHORT INTERVAL BETWEEN PREGNANCIES AFFECTING PREGNANCY, ANTEPARTUM: ICD-10-CM

## 2024-01-08 DIAGNOSIS — N89.8 VAGINAL DISCHARGE: ICD-10-CM

## 2024-01-08 DIAGNOSIS — Z34.82 NORMAL PREGNANCY IN MULTIGRAVIDA IN SECOND TRIMESTER: ICD-10-CM

## 2024-01-08 DIAGNOSIS — O36.5920 POOR FETAL GROWTH AFFECTING MANAGEMENT OF MOTHER IN SECOND TRIMESTER, SINGLE OR UNSPECIFIED FETUS: ICD-10-CM

## 2024-01-08 PROCEDURE — 87210 SMEAR WET MOUNT SALINE/INK: CPT

## 2024-01-08 PROCEDURE — 76805 OB US >/= 14 WKS SNGL FETUS: CPT | Mod: TC | Performed by: RADIOLOGY

## 2024-01-08 PROCEDURE — 99207 PR PRENATAL VISIT: CPT | Mod: GC

## 2024-01-08 SDOH — SOCIAL STABILITY - SOCIAL INSECURITY: ACCULTURATION DIFFICULTY: Z60.3

## 2024-01-08 NOTE — NURSING NOTE
Due to patient being non-English speaking/uses sign language, an  was used for this visit. Only for face-to-face interpretation by an external agency, date and length of interpretation can be found on the scanned worksheet.     name: #[id number]  Agency:  [employer]  Language: Nahed   Telephone number: [phone]  Type of interpretation: Telephone, spoken

## 2024-01-08 NOTE — PROGRESS NOTES
Assessment & Plan  Dipti Kirkpatrick is a 23 year old , at 22w5d weeks of pregnancy with ROQUE of May 8, 2024 by LMP consistent with 11 week ultrasound.  Patient's history is high risk for the following reasons: short interval pregnancy, non-English speaking and new fetal growth restriction as determined by today's comprehensive U/S with an EFW in the 4th %ile.     # High risk concerns (medical problems, high risk surgical/obstetric history, high risk social situation, etc):     - Problem list reviewed and updated.    Dipti was seen today for prenatal care and vaginal discharge.    Diagnoses and all orders for this visit:    Normal pregnancy in multigravida in second trimester  - 3rd trimester labs (CBC, syphilis, 1 hour GCT) at next visit     Language barrier, cultural differences    Short interval between pregnancies affecting pregnancy, antepartum    Poor fetal growth affecting management of mother in second trimester, single or unspecified fetus  - New finding today during comprehensive U/S prior to today's visit. EFW 4th %ile.   - Patient endorses history of this in most recent pregnancy with EFW 6% around 36 weeks.   - F/U growth scan in 4 weeks, ordered today  - referral to MFM for counseling and monitoring   - if patient hasn't seen MFM by next visit in 4 weeks, collect umbilical cord dopplers and BPP    Vaginal Discharge  - New problem today. Denies dysuria or frequency, no vaginal bleeding.   - wet prep resulted negative in clinic, patient informed and no changes to treatment plan      Weight gain inadequate: 3.901 kg (8 lb 9.6 oz) to date, out of recommended total of 25-35 lbs (pregravid BMI 18.5-24.9)      Return to clinic in 4 weeks.    Ara Ashby DO PGY2  I precepted today with Dr. Garcia.        Subjective  Concerns: vaginal discharge     ROS:  No - Headache  No - Changes in vision  No - Chest Pain  No - Shortness of Breath  No - Nausea   No - Vomiting  No - Abdominal pain   No -  "Contractions  No - Dysuria   YES - Vaginal Discharge, yellow in color. Denies dysuria or vaginal itching but endorses foul smell.   No - Vaginal bleeding   No - Loss of Fluid   No - Extremity swelling   Present - Fetal movement       Patient Active Problem List   Diagnosis    Language barrier, cultural differences    Mass of right axilla    Short interval between pregnancies affecting pregnancy, antepartum    Supervision of high risk pregnancy, antepartum       Dipti Kirkpatrick speaks Hmong so an  was used today.    Guidance:  signs of miscarriage  OTC medications  genetic testing  weight gain and exercise  quickening  child birth education  fetal survey ultrasound    [place] to [place]      Objective  /66 (BP Location: Right arm, Patient Position: Sitting, Cuff Size: Adult Regular)   Pulse 76   Temp 97.9  F (36.6  C) (Tympanic)   Resp 20   Ht 1.474 m (4' 10.03\")   Wt 54.7 kg (120 lb 9.6 oz)   LMP 08/02/2023 (Exact Date)   SpO2 100%   BMI 25.18 kg/m    No distress.  Gravid abdomen.  .  Fundal height 22 cm.  no edema.    Results  Blood type: B POS  No results found for any visits on 01/08/24.   "

## 2024-01-08 NOTE — PROGRESS NOTES
Preceptor Attestation:    I discussed the patient with the resident and evaluated the patient in person. I have verified the content of the note, which accurately reflects my assessment of the patient and the plan of care.   Supervising Physician:  Nina Garcia MD

## 2024-01-09 ENCOUNTER — TRANSCRIBE ORDERS (OUTPATIENT)
Dept: MATERNAL FETAL MEDICINE | Facility: HOSPITAL | Age: 24
End: 2024-01-09
Payer: COMMERCIAL

## 2024-01-09 DIAGNOSIS — O26.90 PREGNANCY RELATED CONDITION, ANTEPARTUM: Primary | ICD-10-CM

## 2024-01-10 PROBLEM — O36.5990 FETAL GROWTH RESTRICTION ANTEPARTUM: Status: ACTIVE | Noted: 2023-01-13

## 2024-01-10 PROBLEM — O36.5990 FETAL GROWTH RESTRICTION ANTEPARTUM: Status: ACTIVE | Noted: 2024-01-08

## 2024-01-19 ENCOUNTER — PRE VISIT (OUTPATIENT)
Dept: MATERNAL FETAL MEDICINE | Facility: HOSPITAL | Age: 24
End: 2024-01-19
Payer: COMMERCIAL

## 2024-01-23 ENCOUNTER — OFFICE VISIT (OUTPATIENT)
Dept: MATERNAL FETAL MEDICINE | Facility: HOSPITAL | Age: 24
End: 2024-01-23
Attending: OBSTETRICS & GYNECOLOGY
Payer: COMMERCIAL

## 2024-01-23 ENCOUNTER — ANCILLARY PROCEDURE (OUTPATIENT)
Dept: ULTRASOUND IMAGING | Facility: HOSPITAL | Age: 24
End: 2024-01-23
Attending: OBSTETRICS & GYNECOLOGY
Payer: COMMERCIAL

## 2024-01-23 DIAGNOSIS — O40.3XX0 POLYHYDRAMNIOS, THIRD TRIMESTER, NOT APPLICABLE OR UNSPECIFIED: Primary | ICD-10-CM

## 2024-01-23 DIAGNOSIS — O26.90 PREGNANCY RELATED CONDITION, ANTEPARTUM: ICD-10-CM

## 2024-01-23 PROCEDURE — 76811 OB US DETAILED SNGL FETUS: CPT

## 2024-01-23 PROCEDURE — 99207 PR NO CHARGE LOS: CPT | Performed by: OBSTETRICS & GYNECOLOGY

## 2024-01-23 PROCEDURE — 76811 OB US DETAILED SNGL FETUS: CPT | Mod: 26 | Performed by: OBSTETRICS & GYNECOLOGY

## 2024-01-23 PROCEDURE — 99212 OFFICE O/P EST SF 10 MIN: CPT | Mod: 25 | Performed by: OBSTETRICS & GYNECOLOGY

## 2024-01-23 NOTE — NURSING NOTE
Dipti Kirkpatrick is a  at 24w6d who presents to [facility] for a comprehensive ultrasound for concern for FGR on outside scan.  I pad Hmong  utilized for today's visit.  Pt reports positive fetal movement. Pt denies bldg/lof/change in discharge, contractions, headache, vision changes, chest pain/SOB or edema. SBAR given to Dr. An, see note in Epic.      Evangelina Mueller RN

## 2024-01-24 PROBLEM — O36.5990 FETAL GROWTH RESTRICTION ANTEPARTUM: Status: RESOLVED | Noted: 2024-01-08 | Resolved: 2024-01-24

## 2024-01-28 NOTE — NURSING NOTE
Dipti Kirkpatrick is a  at 34w4d who presents to [facility] for L2 ultrasound. Pt reports positive fetal movement. Pt denies bldg/lof/change in discharge, contractions, headache, vision changes, chest pain/SOB or edema. SBAR given to Dr. Mendoza, see note in Epic.       
impaired balance/decreased flexibility/decreased strength

## 2024-02-05 ENCOUNTER — OFFICE VISIT (OUTPATIENT)
Dept: FAMILY MEDICINE | Facility: CLINIC | Age: 24
End: 2024-02-05
Payer: COMMERCIAL

## 2024-02-05 VITALS
BODY MASS INDEX: 27.29 KG/M2 | RESPIRATION RATE: 16 BRPM | HEART RATE: 79 BPM | DIASTOLIC BLOOD PRESSURE: 59 MMHG | SYSTOLIC BLOOD PRESSURE: 95 MMHG | WEIGHT: 130 LBS | OXYGEN SATURATION: 99 % | TEMPERATURE: 97.8 F | HEIGHT: 58 IN

## 2024-02-05 DIAGNOSIS — Z60.3 LANGUAGE BARRIER, CULTURAL DIFFERENCES: ICD-10-CM

## 2024-02-05 DIAGNOSIS — Z34.82 NORMAL PREGNANCY IN MULTIGRAVIDA IN SECOND TRIMESTER: Primary | ICD-10-CM

## 2024-02-05 DIAGNOSIS — O09.899 SHORT INTERVAL BETWEEN PREGNANCIES AFFECTING PREGNANCY, ANTEPARTUM: ICD-10-CM

## 2024-02-05 LAB
ERYTHROCYTE [DISTWIDTH] IN BLOOD BY AUTOMATED COUNT: 13.8 % (ref 10–15)
GLUCOSE 1H P 50 G GLC PO SERPL-MCNC: 93 MG/DL (ref 70–129)
HCT VFR BLD AUTO: 32 % (ref 35–47)
HGB BLD-MCNC: 11.3 G/DL (ref 11.7–15.7)
MCH RBC QN AUTO: 32.9 PG (ref 26.5–33)
MCHC RBC AUTO-ENTMCNC: 35.3 G/DL (ref 31.5–36.5)
MCV RBC AUTO: 93 FL (ref 78–100)
PLATELET # BLD AUTO: 208 10E3/UL (ref 150–450)
RBC # BLD AUTO: 3.43 10E6/UL (ref 3.8–5.2)
WBC # BLD AUTO: 9.8 10E3/UL (ref 4–11)

## 2024-02-05 PROCEDURE — 82950 GLUCOSE TEST: CPT

## 2024-02-05 PROCEDURE — 85027 COMPLETE CBC AUTOMATED: CPT

## 2024-02-05 PROCEDURE — 99207 PR PRENATAL VISIT: CPT | Mod: GC

## 2024-02-05 PROCEDURE — 36415 COLL VENOUS BLD VENIPUNCTURE: CPT

## 2024-02-05 PROCEDURE — 86780 TREPONEMA PALLIDUM: CPT

## 2024-02-05 SDOH — SOCIAL STABILITY - SOCIAL INSECURITY: ACCULTURATION DIFFICULTY: Z60.3

## 2024-02-05 NOTE — PROGRESS NOTES
Assessment & Plan  Dipti Kirkpatrick is a 23 year old , at 25w6d weeks of pregnancy with ROQUE of May 14, 2024 by LMP consistent with 11 week ultrasound.  Patient's history is high risk for the following reasons: short interval pregnancy and non-English speaking.    # High risk concerns (medical problems, high risk surgical/obstetric history, high risk social situation, etc):   - Problem list reviewed and updated.    Diagnoses and all orders for this visit:    Language barrier, cultural differences    Normal pregnancy in multigravida in second trimester  - CBC, syphilis, and 1 hour GCT today     Short interval between pregnancies affecting pregnancy, antepartum  - growth scan at around 32 weeks gestation as recommended by [facility]      Weight gain adequate: 8.165 kg (18 lb) to date, out of recommended total of 25-35 lbs (pregravid BMI 18.5-24.9)    There are no Patient Instructions on file for this visit.    Return to clinic in 4 weeks.    rAa Ashby, DO  I precepted today with Gio Edgar MD.         Subjective  Concerns: None    ROS:  YES - Headache, left-sided temporal headache.   No - Changes in vision  No - Chest Pain  No - Shortness of Breath  No - Nausea   No - Vomiting  No - Abdominal pain   No - Contractions  No - Dysuria   YES - Vaginal Discharge, normal yellow discharge   No - Vaginal bleeding   No - Loss of Fluid   No - Extremity swelling   Present - Fetal movement     Going to WIC? Yes      Risk Assessment   Average Risk Category  No significant risk factors: Yes    At Risk Category (up to 3)  Teen pregnancy: No  Poor social situation: No  Domestic abuse: No  Financial difficulties: No  Smoker: No  H/O  deliver: No  H/O drug abuse: No  Non-English speaking: Yes  Advanced maternal age: No  GDM risks: No  Previous C/S: No  H/O PIH: No  H/O STIs: No  H/O mental health concerns: No  Onset care > 20 weeks: No  Other: short interval between pregnancies    High Risk Category (4 or more At Risk  "or)  Diabetes/GDM: No  Multiple gestation: No  Chronic hypertension: No  Significant hx of asthma: No  Fetal demise > 20 weeks: No  Positive tox screen: No  Current mental health treatment: No    Risk: At Risk   Date determined: 2024    Patient Active Problem List   Diagnosis    Language barrier, cultural differences    Mass of right axilla    Short interval between pregnancies affecting pregnancy, antepartum    Supervision of high risk pregnancy, antepartum       Dipti Kirkpatrick speaks Hmong so an  was used today.    Guidance:  breastfeeding  GDM  signs of  labor    Objective  BP 95/59 (BP Location: Right arm, Patient Position: Sitting, Cuff Size: Adult Regular)   Pulse 79   Temp 97.8  F (36.6  C) (Oral)   Resp 16   Ht 1.474 m (4' 10.03\")   Wt 59 kg (130 lb)   LMP 2023   SpO2 99%   BMI 27.14 kg/m    No distress.  Gravid abdomen.  .  Fundal height 27 cm.  no edema.    Results  Blood type: B POS  No results found for any visits on 24.   "

## 2024-02-05 NOTE — PROGRESS NOTES
Physician Attestation   I, Gio Edgar MD, saw this patient and agree with the findings and plan of care as documented in the note.      Items personally reviewed/procedural attestation: vitals.    Gio Edgar MD

## 2024-02-06 LAB — T PALLIDUM AB SER QL: NONREACTIVE

## 2024-02-07 NOTE — RESULT ENCOUNTER NOTE
Pola Chandra, can you please call this patient to review normal results and encourage her to continue taking her PNV w/ iron for the mildly low hgb? Thanks! - TA

## 2024-03-15 ENCOUNTER — OFFICE VISIT (OUTPATIENT)
Dept: FAMILY MEDICINE | Facility: CLINIC | Age: 24
End: 2024-03-15
Payer: COMMERCIAL

## 2024-03-15 VITALS
HEIGHT: 57 IN | RESPIRATION RATE: 20 BRPM | HEART RATE: 88 BPM | DIASTOLIC BLOOD PRESSURE: 53 MMHG | BODY MASS INDEX: 28.87 KG/M2 | WEIGHT: 133.8 LBS | SYSTOLIC BLOOD PRESSURE: 91 MMHG | TEMPERATURE: 98 F | OXYGEN SATURATION: 96 %

## 2024-03-15 DIAGNOSIS — O26.843 FUNDAL HEIGHT LOW FOR DATES IN THIRD TRIMESTER: ICD-10-CM

## 2024-03-15 DIAGNOSIS — Z34.80 PRENATAL CARE, SUBSEQUENT PREGNANCY, UNSPECIFIED TRIMESTER: Primary | ICD-10-CM

## 2024-03-15 PROCEDURE — 90715 TDAP VACCINE 7 YRS/> IM: CPT | Performed by: STUDENT IN AN ORGANIZED HEALTH CARE EDUCATION/TRAINING PROGRAM

## 2024-03-15 PROCEDURE — 99207 PR PRENATAL VISIT: CPT | Mod: GC | Performed by: STUDENT IN AN ORGANIZED HEALTH CARE EDUCATION/TRAINING PROGRAM

## 2024-03-15 PROCEDURE — 90471 IMMUNIZATION ADMIN: CPT | Performed by: STUDENT IN AN ORGANIZED HEALTH CARE EDUCATION/TRAINING PROGRAM

## 2024-03-15 NOTE — PROGRESS NOTES
"Assessment & Plan  Dipti Kirkpatrick is a 23 year old , at 31w3d weeks of pregnancy with ROQUE of May 14, 2024 by  11w6d week ultrasound.  Patient's history is high risk for the following reasons: short interval pregnancy and language barrier.     # Supervision of pregnancy, third trimester  - Normal FHT. No concerns today  - Tdap administered   -Weight gain adequate: 9.888 kg (21 lb 12.8 oz) to date, out of recommended total of 25-35 lbs (pregravid BMI 18.5-24.9)    Fundal height low for dates in third trimester   -US ordered     Return to clinic in 4 weeks.    Aleisha Mcgregor MD PGY2  I precepted today with Suzi Robison MD.    Subjective  Concerns: No    ROS:  No - Headache  Intermitently - Changes in vision  No - Chest Pain  No - Shortness of Breath  No - Nausea   No - Vomiting  No - Abdominal pain   No - Contractions  No - Dysuria   No - Vaginal Discharge    No - Vaginal bleeding   No - Loss of Fluid   No - Extremity swelling   Present - Fetal movement       Going to WIC? Yes    Patient Active Problem List   Diagnosis    Language barrier, cultural differences    Mass of right axilla    Short interval between pregnancies affecting pregnancy, antepartum    Supervision of high risk pregnancy, antepartum       Dipti Kirkpatrick speaks Hmong so an  was used today.    Guidance:  seatbelt use  fetal growth and movement  signs of  labor    Do you need help getting a car seat? No  Do you need help getting a breast pump? No      Objective  BP 91/53   Pulse 88   Temp 98  F (36.7  C) (Tympanic)   Resp 20   Ht 1.455 m (4' 9.28\")   Wt 60.7 kg (133 lb 12.8 oz)   LMP 2023   SpO2 96%   BMI 28.67 kg/m    No distress.  Gravid abdomen.  -142.  Fundal height 29 cm.  no edema.    Results  Blood type: B POS  No results found for any visits on 03/15/24.   "

## 2024-03-15 NOTE — NURSING NOTE
Prior to immunization administration, verified patients identity using patient s name and date of birth. Please see Immunization Activity for additional information.     Screening Questionnaire for Adult Immunization    Are you sick today?   No   Do you have allergies to medications, food, a vaccine component or latex?   No   Have you ever had a serious reaction after receiving a vaccination?   No   Do you have a long-term health problem with heart, lung, kidney, or metabolic disease (e.g., diabetes), asthma, a blood disorder, no spleen, complement component deficiency, a cochlear implant, or a spinal fluid leak?  Are you on long-term aspirin therapy?   No   Do you have cancer, leukemia, HIV/AIDS, or any other immune system problem?   No   Do you have a parent, brother, or sister with an immune system problem?   No   In the past 3 months, have you taken medications that affect  your immune system, such as prednisone, other steroids, or anticancer drugs; drugs for the treatment of rheumatoid arthritis, Crohn s disease, or psoriasis; or have you had radiation treatments?   No   Have you had a seizure, or a brain or other nervous system problem?   No   During the past year, have you received a transfusion of blood or blood    products, or been given immune (gamma) globulin or antiviral drug?   No   For women: Are you pregnant or is there a chance you could become       pregnant during the next month?   Yes   Have you received any vaccinations in the past 4 weeks?   No     Immunization questionnaire was positive for at least one answer.  Dr. Mcgregor is aware.      Patient instructed to remain in clinic for 15 minutes afterwards, and to report any adverse reactions.     Screening performed by CORINE MYLES MA on 3/15/2024 at 3:54 PM.

## 2024-03-15 NOTE — PATIENT INSTRUCTIONS
"Weeks 32 to 34 of Your Pregnancy: Care Instructions    Decide whether you want to bank or donate your baby's umbilical cord blood. If you want to save this blood, you have to arrange for it ahead of time.    Decide about circumcision. Personal, Amish, or cultural beliefs may play a role in your decision. You get to decide what you want for your baby.    Learn how to ease hemorrhoids.    Get more liquids, fruits, vegetables, and fiber in your diet.  Avoid sitting for too long.  Clean yourself with moist toilet paper. Or try witch hazel pads.  Try ice packs or warm sitz baths for discomfort.  Use hydrocortisone cream for pain or itching.  Ask your doctor about stool softeners.    Consider the benefits of breastfeeding.    It reduces your baby's risk of sudden infant death syndrome (SIDS).   babies are less likely to get certain infections. And they're less likely to be obese or get diabetes later in life.  It can lower your risk of breast and ovarian cancers and osteoporosis.  It saves you money.  Follow-up care is a key part of your treatment and safety. Be sure to make and go to all appointments, and call your doctor if you are having problems. It's also a good idea to know your test results and keep a list of the medicines you take.  Where can you learn more?  Go to https://www.[employer].net/patiented  Enter X711 in the search box to learn more about \"Weeks 32 to 34 of Your Pregnancy: Care Instructions.\"  Current as of: July 10, 2023               Content Version: 14.0    1265-4247 [employer].   Care instructions adapted under license by your healthcare professional. If you have questions about a medical condition or this instruction, always ask your healthcare professional. [employer], [employer] disclaims any warranty or liability for your use of this information.      "

## 2024-03-15 NOTE — PROGRESS NOTES
"Preceptor attestation:  Vital signs reviewed: BP 91/53   Pulse 88   Temp 98  F (36.7  C) (Tympanic)   Resp 20   Ht 1.455 m (4' 9.28\")   Wt 60.7 kg (133 lb 12.8 oz)   LMP 08/02/2023   SpO2 96%   BMI 28.67 kg/m      Patient seen, evaluated, and discussed with the resident.  I verified the content of the note, which accurately reflects my assessment of the patient and the plan of care.    Supervising physician: Suzi Robison MD  [facility]  "

## 2024-03-25 ENCOUNTER — ANCILLARY PROCEDURE (OUTPATIENT)
Dept: ULTRASOUND IMAGING | Facility: CLINIC | Age: 24
End: 2024-03-25
Attending: FAMILY MEDICINE
Payer: COMMERCIAL

## 2024-03-25 DIAGNOSIS — O26.843 FUNDAL HEIGHT LOW FOR DATES IN THIRD TRIMESTER: ICD-10-CM

## 2024-03-25 PROCEDURE — 76816 OB US FOLLOW-UP PER FETUS: CPT | Mod: TC | Performed by: STUDENT IN AN ORGANIZED HEALTH CARE EDUCATION/TRAINING PROGRAM

## 2024-03-25 NOTE — NURSING NOTE
Due to patient being non-English speaking/uses sign language, an  was used for this visit. Only for face-to-face interpretation by an external agency, date and length of interpretation can be found on the scanned worksheet.     name: Sandra  Agency:  [employer]  Language: Gladisong   Telephone number: [phone]  Type of interpretation: Telephone, spoken

## 2024-03-26 ENCOUNTER — APPOINTMENT (OUTPATIENT)
Dept: INTERPRETER SERVICES | Facility: CLINIC | Age: 24
End: 2024-03-26
Payer: COMMERCIAL

## 2024-04-15 ENCOUNTER — OFFICE VISIT (OUTPATIENT)
Dept: FAMILY MEDICINE | Facility: CLINIC | Age: 24
End: 2024-04-15
Payer: COMMERCIAL

## 2024-04-15 VITALS
HEART RATE: 65 BPM | HEIGHT: 57 IN | RESPIRATION RATE: 20 BRPM | DIASTOLIC BLOOD PRESSURE: 70 MMHG | SYSTOLIC BLOOD PRESSURE: 105 MMHG | BODY MASS INDEX: 29.77 KG/M2 | WEIGHT: 138 LBS | TEMPERATURE: 97 F | OXYGEN SATURATION: 98 %

## 2024-04-15 DIAGNOSIS — Z34.83 NORMAL PREGNANCY IN MULTIGRAVIDA IN THIRD TRIMESTER: Primary | ICD-10-CM

## 2024-04-15 DIAGNOSIS — O09.899 SHORT INTERVAL BETWEEN PREGNANCIES AFFECTING PREGNANCY, ANTEPARTUM: ICD-10-CM

## 2024-04-15 DIAGNOSIS — O35.HXX0 SHORT FEMUR OF FETUS ON PRENATAL ULTRASOUND: ICD-10-CM

## 2024-04-15 DIAGNOSIS — Z60.3 LANGUAGE BARRIER, CULTURAL DIFFERENCES: ICD-10-CM

## 2024-04-15 PROCEDURE — 99207 PR PRENATAL VISIT: CPT | Mod: GC

## 2024-04-15 SDOH — SOCIAL STABILITY - SOCIAL INSECURITY: ACCULTURATION DIFFICULTY: Z60.3

## 2024-04-15 NOTE — PROGRESS NOTES
"Assessment & Plan  Dipti Kirkpatrick is a 23 year old , at 35w6d weeks of pregnancy with ROQUE of May 14, 2024 by  11w 6d week ultrasound. Patient's history is high risk for the following reasons: short interval pregnancy and language barrier.     # High risk concerns (medical problems, high risk surgical/obstetric history, high risk social situation, etc):   - Problem list reviewed and updated.    Dipti was seen today for prenatal care.    Diagnoses and all orders for this visit:    Normal pregnancy in multigravida in third trimester  - GBS and confirm vertex at next visit!    Short interval between pregnancies affecting pregnancy, antepartum    Language barrier, cultural differences    Short femur of fetus on prenatal ultrasound      Weight gain adequate: 11.8 kg (26 lb) to date, out of recommended total of 25-35 lbs (pregravid BMI 18.5-24.9)    Return to clinic in 1 week.    Ara Ashby, DO  I precepted today with Jung Perkins MD.      Subjective  Concerns: None, feeling well     ROS:  No - Headache  No - Changes in vision  No - Chest Pain  No - Shortness of Breath  No - Nausea   No - Vomiting  No - Abdominal pain   No - Contractions  No - Dysuria   No - Vaginal Discharge    No - Vaginal bleeding   No - Loss of Fluid   No - Extremity swelling   Present - Fetal movement       Going to WIC? Yes      Patient Active Problem List   Diagnosis    Language barrier, cultural differences    Mass of right axilla    Short interval between pregnancies affecting pregnancy, antepartum    Supervision of high risk pregnancy, antepartum       Dipti Kirkpatrick speaks Hmong so an  was used today.    Guidance:  birth control  travel  warning signs/PIH    Do you need help getting a car seat? No  Do you need help getting a breast pump? No      Objective  /70   Pulse 65   Temp 97  F (36.1  C) (Tympanic)   Resp 20   Ht 1.455 m (4' 9.28\")   Wt 62.6 kg (138 lb)   LMP 2023   SpO2 98%   BMI 29.57 kg/m    No " distress.  Gravid abdomen.  -137.  Fundal height 36.5 cm.  no edema.    Results  Blood type: B POS  No results found for any visits on 04/15/24.

## 2024-04-16 NOTE — PATIENT INSTRUCTIONS
Weeks 34 to 36 of Your Pregnancy: Care Instructions  Your belly has grown quite large. It's almost time to give birth! Your baby's lungs are almost ready to breathe air. The skull bones are firm enough to protect your baby's head. But they're soft enough to move down through the birth canal.    You might be wondering what to expect during labor. Because each birth is different, there's no way to know exactly what childbirth will be like for you. Talk to your doctor or midwife about any concerns you have.    You'll probably have a test for group B streptococcus (GBS). GBS is bacteria that can live in the vagina and rectum. GBS can make your baby sick after birth. If you test positive, you'll get antibiotics during labor.    Choose what type of pain relief you would like during labor.  You can choose from a few types, including medicine and non-medicine options. You may want to use several types of pain relief.     Know how medicines can help with pain during labor.  Some medicines lower anxiety and help with some of the pain. Others make your lower body numb so that you will feel less pain.     Tell your doctor about your pain medicine choice.  Do this before you start labor or very early in your labor. You may be able to change your mind during labor.     Learn about the stages of labor.    The first stage includes the early (latent) and active phases of labor. Contractions start in early labor. During active labor, contractions get stronger, last longer, and happen more often. And the cervix opens more rapidly.  The second stage starts when you're ready to push. During this stage, your baby is born.  During the third stage, you'll usually have a few more contractions to push out the placenta.   Follow-up care is a key part of your treatment and safety. Be sure to make and go to all appointments, and call your doctor if you are having problems. It's also a good idea to know your test results and keep a list of the  "medicines you take.  Where can you learn more?  Go to https://www.[employer].net/patiented  Enter B912 in the search box to learn more about \"Weeks 34 to 36 of Your Pregnancy: Care Instructions.\"  Current as of: July 10, 2023               Content Version: 14.0    9919-9007 [employer].   Care instructions adapted under license by your healthcare professional. If you have questions about a medical condition or this instruction, always ask your healthcare professional. [employer] disclaims any warranty or liability for your use of this information.      Group B Strep During Pregnancy: Care Instructions  Overview     Group B strep infection is caused by a type of bacteria. It's a different kind of bacteria than the kind that causes strep throat.  You may have this kind of bacteria in your body. Sometimes it may cause an infection, but most of the time it doesn't make you sick or cause symptoms. But if you pass the bacteria to your baby during the birth, it can cause serious health problems for your baby.  If you have this bacteria in your body, you will get antibiotics when you are in labor. Antibiotics help prevent problems for a  baby.  After birth, doctors will watch and may test your baby. If your baby tests positive for Group B strep, your baby will get antibiotics.  If you plan to breastfeed your baby, don't worry. It will be safe to breastfeed.  Follow-up care is a key part of your treatment and safety. Be sure to make and go to all appointments, and call your doctor if you are having problems. It's also a good idea to know your test results and keep a list of the medicines you take.  How can you care for yourself at home?  If your doctor has prescribed antibiotics, take them as directed. Do not stop taking them just because you feel better. You need to take the full course of antibiotics.  Tell your doctor if you are allergic to any antibiotic.  If you go into labor, or your " "water breaks, go to the hospital. Your doctor will give you antibiotics to help protect your baby from infection.  Tell the doctors and nurses if you have an allergy to penicillin.  Tell the doctors and nurses at the hospital that you tested positive for group B strep.  When should you call for help?   Call your doctor now or seek immediate medical care if:    You have symptoms of a urinary tract infection. These may include:  Pain or burning when you urinate.  A frequent need to urinate without being able to pass much urine.  Pain in the flank, which is just below the rib cage and above the waist on either side of the back.  Blood in your urine.  A fever.     You think you are in labor or your water has broken.     You have pain in your belly or pelvis.   Watch closely for changes in your health, and be sure to contact your doctor if you have any problems.  Where can you learn more?  Go to https://www.[employer].net/patiented  Enter M001 in the search box to learn more about \"Group B Strep During Pregnancy: Care Instructions.\"  Current as of: June 12, 2023               Content Version: 14.0    6431-8848 [employer].   Care instructions adapted under license by your healthcare professional. If you have questions about a medical condition or this instruction, always ask your healthcare professional. [employer] disclaims any warranty or liability for your use of this information.      "

## 2024-04-25 ENCOUNTER — OFFICE VISIT (OUTPATIENT)
Dept: FAMILY MEDICINE | Facility: CLINIC | Age: 24
End: 2024-04-25
Payer: COMMERCIAL

## 2024-04-25 VITALS
HEART RATE: 64 BPM | DIASTOLIC BLOOD PRESSURE: 59 MMHG | SYSTOLIC BLOOD PRESSURE: 96 MMHG | TEMPERATURE: 98 F | OXYGEN SATURATION: 96 % | WEIGHT: 141.4 LBS | HEIGHT: 59 IN | RESPIRATION RATE: 20 BRPM | BODY MASS INDEX: 28.51 KG/M2

## 2024-04-25 DIAGNOSIS — Z34.83 NORMAL PREGNANCY IN MULTIGRAVIDA IN THIRD TRIMESTER: Primary | ICD-10-CM

## 2024-04-25 PROCEDURE — 99207 PR PRENATAL VISIT: CPT | Mod: GC

## 2024-04-25 PROCEDURE — 87653 STREP B DNA AMP PROBE: CPT

## 2024-04-25 NOTE — PROGRESS NOTES
Assessment & Plan  Dipti Kirkpatrick is a 23 year old , at 37w2d weeks of pregnancy with ROQUE of May 14, 2024 by  11 and 6/7 week ultrasound.  Patient's history is high risk for the following reasons: short interval pregnancy and language barrier.     # High risk concerns (medical problems, high risk surgical/obstetric history, high risk social situation, etc):   - In addition to routine prenatal care, patient will need:   - GBS collected today, positive for last baby/delivery   - confirmed vertex position with bedside U/S    - went into spontaneous labor with last delivery at 40 weeks  - Problem list reviewed and updated.    Weight gain adequate: 13.3 kg (29 lb 6.4 oz) to date, out of recommended total of 25-35 lbs (pregravid BMI 18.5-24.9)    Patient Instructions   Week 37 of Your Pregnancy: Care Instructions    Most babies are born between 37 and 40 weeks.    This is a good time to pack a bag to take with you to the birth. Then it will be ready to go when you are.    Learn about breastfeeding.  For example, find out about ways to hold your baby to make breastfeeding easier. And think about learning how to pump and store milk.     Know that crying is normal.  It's common for babies to cry 1 to 3 hours a day. Some cry more, and some cry less.     Learn why babies cry.  They may be hungry; have gas; need a diaper change; or feel cold, warm, tired, lonely, or tense. Sometimes they cry for unknown reasons.     Think about what will help you stay calm when your baby cries.  Taking slow, deep breaths can help. So can taking a break. It's okay to put your baby somewhere safe (like their crib) and walk away for a few minutes.     Learn about safe sleep for your baby.  Always put your baby to sleep on their back. Place them alone in a crib or bassinet with a firm, flat surface. Keep soft items like stuffed animals out of the crib.     Learn what to expect with  poop.  Your baby will have their own bowel patterns.  "Some babies have several bowel movements a day. Some have fewer.     Know that  babies will often have loose, yellow bowel movements.  Formula-fed babies have more formed stools. If your baby's poop looks like pellets, your baby is constipated.   Follow-up care is a key part of your treatment and safety. Be sure to make and go to all appointments, and call your doctor if you are having problems. It's also a good idea to know your test results and keep a list of the medicines you take.  Where can you learn more?  Go to https://www.[employer].net/patiented  Enter N257 in the search box to learn more about \"Week 37 of Your Pregnancy: Care Instructions.\"  Current as of: July 10, 2023               Content Version: 14.0    8886-0592 [email or web address].   Care instructions adapted under license by your healthcare professional. If you have questions about a medical condition or this instruction, always ask your healthcare professional. [email or web address] disclaims any warranty or liability for your use of this information.         Return to clinic in 1 week.    Ara Ashby, DO  I precepted today with Gio Edgar MD.      Subjective  Concerns: None    ROS:  No - Headache  YES - Changes in vision, after standing and working for long time, resolves with rest  No - Chest Pain  No - Shortness of Breath  No - Nausea   No - Vomiting  No - Abdominal pain   No - Contractions  No - Dysuria   YES - Vaginal Discharge, white and without odor  No - Vaginal bleeding   No - Loss of Fluid   No - Extremity swelling   Present - Fetal movement       Going to WIC? Yes      Patient Active Problem List   Diagnosis    Language barrier, cultural differences    Mass of right axilla    Short interval between pregnancies affecting pregnancy, antepartum    Supervision of high risk pregnancy, antepartum       Dipti Kirkpatrick speaks Hmong so an  was used today.    Guidance:  post-partum care  GBS- was positive with last " "pregnancy, adequately treated with antibiotics in labor   signs of labor- reviewed, she feels comfortable identifying these from previous pregnancy/birth  pain control during labor- un-medicated last time, undecided at this time    Do you need help getting a car seat? No  Do you need help getting a breast pump? No      Objective  BP 96/59   Pulse 64   Temp 98  F (36.7  C) (Oral)   Resp 20   Ht 1.488 m (4' 10.6\")   Wt 64.1 kg (141 lb 6.4 oz)   LMP 08/02/2023   SpO2 96%   BMI 28.95 kg/m    No distress.  Gravid abdomen.  .  Fundal height 37.5 cm.  no edema.  Cervix: not examined, patient defers until next visit    Results  Blood type: B POS  Results for orders placed or performed in visit on 04/25/24   Group B strep PCR     Status: Abnormal    Specimen: Rectovaginal; Swab   Result Value Ref Range    Group B Strep PCR Positive (A) Negative    Narrative    The [employer] Xpert GBS LB Assay, performed on the [employer], is a qualitative in vitro diagnostic test designed to detect Group B Streptococcus (GBS) DNA from enriched vaginal/rectal swab specimens, using fully automated, real-time polymerase chain reaction (PCR) with fluorogenic detection of the amplified DNA. Xpert GBS LB Assay testing is indicated as an aid in determining GBS colonization status in antepartum women. This assay does not diagnose or monitor treatment for GBS infections. The [employer] Xpert GBS LB Assay is intended for use in hospital, reference or state laboratory settings. The device is not intended for point-of-care use.      "

## 2024-04-25 NOTE — PATIENT INSTRUCTIONS
"Week 37 of Your Pregnancy: Care Instructions    Most babies are born between 37 and 40 weeks.    This is a good time to pack a bag to take with you to the birth. Then it will be ready to go when you are.    Learn about breastfeeding.  For example, find out about ways to hold your baby to make breastfeeding easier. And think about learning how to pump and store milk.     Know that crying is normal.  It's common for babies to cry 1 to 3 hours a day. Some cry more, and some cry less.     Learn why babies cry.  They may be hungry; have gas; need a diaper change; or feel cold, warm, tired, lonely, or tense. Sometimes they cry for unknown reasons.     Think about what will help you stay calm when your baby cries.  Taking slow, deep breaths can help. So can taking a break. It's okay to put your baby somewhere safe (like their crib) and walk away for a few minutes.     Learn about safe sleep for your baby.  Always put your baby to sleep on their back. Place them alone in a crib or bassinet with a firm, flat surface. Keep soft items like stuffed animals out of the crib.     Learn what to expect with  poop.  Your baby will have their own bowel patterns. Some babies have several bowel movements a day. Some have fewer.     Know that  babies will often have loose, yellow bowel movements.  Formula-fed babies have more formed stools. If your baby's poop looks like pellets, your baby is constipated.   Follow-up care is a key part of your treatment and safety. Be sure to make and go to all appointments, and call your doctor if you are having problems. It's also a good idea to know your test results and keep a list of the medicines you take.  Where can you learn more?  Go to https://www.[employer].net/patiented  Enter N257 in the search box to learn more about \"Week 37 of Your Pregnancy: Care Instructions.\"  Current as of: July 10, 2023               Content Version: 14.0    4833-8826 [employer].   Care " instructions adapted under license by your healthcare professional. If you have questions about a medical condition or this instruction, always ask your healthcare professional. [employer] disclaims any warranty or liability for your use of this information.

## 2024-04-26 LAB — GP B STREP DNA SPEC QL NAA+PROBE: POSITIVE

## 2024-04-29 ENCOUNTER — OFFICE VISIT (OUTPATIENT)
Dept: FAMILY MEDICINE | Facility: CLINIC | Age: 24
End: 2024-04-29
Payer: COMMERCIAL

## 2024-04-29 VITALS
WEIGHT: 140.8 LBS | BODY MASS INDEX: 29.56 KG/M2 | HEIGHT: 58 IN | OXYGEN SATURATION: 96 % | HEART RATE: 81 BPM | DIASTOLIC BLOOD PRESSURE: 66 MMHG | SYSTOLIC BLOOD PRESSURE: 104 MMHG | RESPIRATION RATE: 20 BRPM | TEMPERATURE: 98.5 F

## 2024-04-29 DIAGNOSIS — O09.899 SHORT INTERVAL BETWEEN PREGNANCIES AFFECTING PREGNANCY, ANTEPARTUM: Primary | ICD-10-CM

## 2024-04-29 PROBLEM — O99.820 GBS (GROUP B STREPTOCOCCUS CARRIER), +RV CULTURE, CURRENTLY PREGNANT: Status: ACTIVE | Noted: 2024-04-25

## 2024-04-29 PROCEDURE — 99207 PR PRENATAL VISIT: CPT | Mod: GC

## 2024-04-29 NOTE — PROGRESS NOTES
Assessment & Plan  Dipti Kirkpatrick is a 23 year old , at 37w6d weeks of pregnancy with ROQUE of May 14, 2024 by  11 week ultrasound.  Patient's history is high risk for the following reasons: short interval pregnancy and language barrier.     # High risk concerns (medical problems, high risk surgical/obstetric history, high risk social situation, etc):   - In addition to routine prenatal care, patient will need:   - Antibiotics in labor, GBS + (collected 24)   - went into spontaneous labor at 40 wks with previous delivery  - Problem list reviewed and updated.    Weight gain adequate: 13.1 kg (28 lb 12.8 oz) to date, out of recommended total of 25-35 lbs (pregravid BMI 18.5-24.9)    Return to clinic in 1 week.    Ara Ashby, DO  I precepted today with Gio Edgar MD.      Subjective  Concerns: None     ROS:  No - Headache  No - Changes in vision  No - Chest Pain  No - Shortness of Breath  No - Nausea   No - Vomiting  No - Abdominal pain   No - Contractions  No - Dysuria   No - Vaginal Discharge    No - Vaginal bleeding   No - Loss of Fluid   No - Extremity swelling   Present - Fetal movement       Going to WIC? Yes      Patient Active Problem List   Diagnosis    Language barrier, cultural differences    Mass of right axilla    Short interval between pregnancies affecting pregnancy, antepartum    Supervision of high risk pregnancy, antepartum    GBS (group B Streptococcus carrier), +RV culture, currently pregnant       Dipti Kirkpatrick speaks Hmong so an  was used today.    Guidance:  post-partum care  GBS- was positive with last pregnancy, adequately treated with antibiotics in labor   signs of labor- reviewed, she feels comfortable identifying these from previous pregnancy/birth  pain control during labor- un-medicated last time, undecided at this time but considering IV Fentanyl or NO gas    Do you need help getting a car seat? No  Do you need help getting a breast pump? No      Objective  BP  "104/66   Pulse 81   Temp 98.5  F (36.9  C) (Oral)   Resp 20   Ht 1.473 m (4' 10\")   Wt 63.9 kg (140 lb 12.8 oz)   LMP 08/02/2023   SpO2 96%   BMI 29.43 kg/m    No distress.  Gravid abdomen.  .  Fundal height 38 cm.  no edema.  Cervix: not examined, deferred by patient     Results  Blood type: B POS  No results found for any visits on 04/29/24.   "

## 2024-05-06 ENCOUNTER — OFFICE VISIT (OUTPATIENT)
Dept: FAMILY MEDICINE | Facility: CLINIC | Age: 24
End: 2024-05-06
Payer: COMMERCIAL

## 2024-05-06 VITALS
SYSTOLIC BLOOD PRESSURE: 100 MMHG | BODY MASS INDEX: 29.89 KG/M2 | RESPIRATION RATE: 20 BRPM | OXYGEN SATURATION: 97 % | DIASTOLIC BLOOD PRESSURE: 65 MMHG | HEIGHT: 58 IN | WEIGHT: 142.4 LBS | TEMPERATURE: 98.6 F | HEART RATE: 84 BPM

## 2024-05-06 DIAGNOSIS — O09.899 SHORT INTERVAL BETWEEN PREGNANCIES AFFECTING PREGNANCY, ANTEPARTUM: Primary | ICD-10-CM

## 2024-05-06 DIAGNOSIS — Z34.83 NORMAL PREGNANCY IN MULTIGRAVIDA IN THIRD TRIMESTER: ICD-10-CM

## 2024-05-06 DIAGNOSIS — N89.8 VAGINAL DISCHARGE: ICD-10-CM

## 2024-05-06 DIAGNOSIS — K59.01 SLOW TRANSIT CONSTIPATION: ICD-10-CM

## 2024-05-06 DIAGNOSIS — Z60.3 LANGUAGE BARRIER, CULTURAL DIFFERENCES: ICD-10-CM

## 2024-05-06 LAB
CLUE CELLS: NORMAL
TRICHOMONAS, WET PREP: NORMAL
WBC'S/HIGH POWER FIELD, WET PREP: NORMAL
YEAST, WET PREP: NORMAL

## 2024-05-06 PROCEDURE — 87210 SMEAR WET MOUNT SALINE/INK: CPT

## 2024-05-06 PROCEDURE — 99207 PR PRENATAL VISIT: CPT | Mod: GC

## 2024-05-06 RX ORDER — POLYETHYLENE GLYCOL 3350 17 G/17G
1 POWDER, FOR SOLUTION ORAL DAILY
Qty: 578 G | Refills: 0 | Status: SHIPPED | OUTPATIENT
Start: 2024-05-06

## 2024-05-06 SDOH — SOCIAL STABILITY - SOCIAL INSECURITY: ACCULTURATION DIFFICULTY: Z60.3

## 2024-05-06 NOTE — PATIENT INSTRUCTIONS
"Week 39 of Your Pregnancy: Care Instructions     babies can look different than what you see in pictures or movies. Their heads can be a strange shape right after birth. And they may have swollen eyes and red marks on their faces.    You can still get pregnant even if you are breastfeeding. If you don't want to get pregnant, talk to your doctor about birth control.  Tips for week 39 of pregnancy  If you plan to breastfeed, get prepared.     Continue to eat healthy foods.  Keep taking your prenatal vitamins during breastfeeding if your doctor recommends it.  Talk to your doctor before taking any medicines or supplements.  Choose the right birth control for you.     Intrauterine devices (IUDs) are placed in the uterus. Sometimes the IUD can be placed right after giving birth. They work for years.  Hormonal implants are placed under the skin of the arm. They also work for years.  Depo-Provera is a shot. You get it every 3 months.  Birth control pills can be used. They're taken every day.  Tubal ligation (tying your tubes) and vasectomy are surgeries. They're permanent.  Diaphragms, spermicide, and condoms must be used each time you have sex. If you used a diaphragm before, you should get refitted after the baby is born.  A birth control patch or ring can be used. These just can't be started until several weeks after you give birth.  Follow-up care is a key part of your treatment and safety. Be sure to make and go to all appointments, and call your doctor if you are having problems. It's also a good idea to know your test results and keep a list of the medicines you take.  Where can you learn more?  Go to https://www.[employer].net/patiented  Enter A811 in the search box to learn more about \"Week 39 of Your Pregnancy: Care Instructions.\"  Current as of: July 10, 2023               Content Version: 14.0    2305-7617 [employer].   Care instructions adapted under license by your healthcare " "professional. If you have questions about a medical condition or this instruction, always ask your healthcare professional. Health[employer], [employer] disclaims any warranty or liability for your use of this information.      Weeks 40 to 41 of Your Pregnancy: Care Instructions  By week 40, you've reached your due date. Your baby could be coming any day. Most babies born after their due dates are healthy. But you may have tests to make sure everything is okay. If you feel stressed, you could try a meditation exercise, such as guided imagery. It may help you relax.    If you are past 41 weeks, your doctor may measure the amount of fluid that surrounds your baby. They may also test your baby's movement and heart rate.    If you don't start labor on your own by 41 or 42 weeks, your doctor may want to start (induce) labor. If there are other concerns, your doctor may talk to you about a .  To start (induce) your labor, your doctor may do any of these things.    Place a narrow tube with a small balloon on the end (balloon catheter) into your cervix.  The doctor inflates the balloon. This helps your cervix open (dilate).     Sweep the membranes (separate the lining of the amniotic sac from the uterus).  This helps the uterus make a chemical that can start contractions.     \"Break your water\" (rupture the amniotic sac).  This may be done if your cervix has started to open.     Use medicines.  They may be used to soften the cervix or start contractions.   How to do guided imagery    Close your eyes, and take a few deep breaths. Picture a peaceful setting, such as a beach or a meadow. Add a winding path. Follow the path until you feel more and more relaxed.    Take a few minutes to breathe slowly and feel the calm. When you are ready, slowly take yourself back to the present. Count to 3, and open your eyes.  Follow-up care is a key part of your treatment and safety. Be sure to make and go to all appointments, and call " "your doctor if you are having problems. It's also a good idea to know your test results and keep a list of the medicines you take.  Where can you learn more?  Go to https://www.[employer].net/patiented  Enter T922 in the search box to learn more about \"Weeks 40 to 41 of Your Pregnancy: Care Instructions.\"  Current as of: July 10, 2023               Content Version: 14.0    6733-6697 [employer].   Care instructions adapted under license by your healthcare professional. If you have questions about a medical condition or this instruction, always ask your healthcare professional. [employer] disclaims any warranty or liability for your use of this information.      Labor Induction  What You Need to Know  What is labor induction?  In most cases, it is best to go into labor naturally. When labor does not start on its own, we may use medicine or other methods to start (induce) labor. This is called induction, which:  Helps the uterus contract  Thins, softens and opens the cervix (opening of the uterus)  When is induction used?  There are two types of induction.  Medical induction may be done to protect the health of the parent or baby if:  There are medical concerns for you or your baby.  You haven't had your baby by 41 weeks.  Induction for non-medical reasons may be done at 39 weeks or later if:  You live far from the hospital.  You've been having contractions for many days.  You've given birth quickly in the past.   We will not perform an induction for non-medical reasons before 39 weeks. Studies show that babies born before 39 weeks may struggle with breathing, feeding, sleeping and staying warm. They are more likely to have health problems and may need to stay in the hospital longer. If we start your labor for medical reasons, the benefits will outweigh these risks. We will talk to you about your risks, benefits and alternatives (other options) before we start your labor.  How is induction " "done?  We may start your labor by doing one or more of the following:  We may need to help your cervix soften and open (sometimes called \"ripening\") to prepare it for labor. There are two ways to do this:  Medicines--these may be in the form of pills that you swallow or medicines that are put into the vagina next to the cervix.  Mechanical--using either a single or double balloon. These are small balloons that are attached to tubes that go up inside the cervix. The balloons are then filled with water to put pressure on the cervix and help the cervix soften and open. Depending on the type of balloon used, you may be allowed to go home after it is placed.  After your cervix is ready:  We may give you medicine through an IV (a small tube placed in your vein). This medicine is called Pitocin. It helps the muscles of your uterus to contract.  We may make a small opening in your bag of water (the sac around your baby). This is called an amniotomy. Sometimes called \"breaking the water\". It may help your uterus contract and your cervix open.  What might happen if my labor is induced?  Some of this depends on how your labor is started and how your body responds. Your labor may be more complicated. You and your baby may need more medical treatments. In general:  You may not go into labor right away. If so, we may send you home with follow-up instructions.  It will be important to monitor you and your baby during the induction.  You may not be able to move around during labor. You will have two belts with monitors attached to your belly. These allow the nurse to watch your contractions and your baby's heart rate.  Your labor may take longer than if you went into labor on your own. It might take more than one day.  If you need cervical ripening, it is important to know that it may be many hours (even days) until delivery happens.  Cervical ripening can be slow and require several doses before your body is ready to labor.  A " long labor may increase the risk of infection in mother and baby.  Your labor may not progress as planned. This could lead to a  birth.  Can I plan the date of my delivery?  After 39 weeks, you may ask about planning your delivery date. This is only an option if your body--and your baby--are ready. To find out, we will check your cervix and your baby's heart rate.   If you are ready to be induced, we will give you a date and time to come to the hospital. If many patients are in labor that day, we may need to start your labor at another time.  If you are not ready, we will not start your labor. It will be safer for your baby to come on its own.  What else do I need to know?  Before you have an induction, make sure you understand the reasons, risks and benefits. Ask your doctor or nurse-midwife:  Why do I need to be induced?  What are the risks to my baby?  How will you start my labor?  How will you know if my baby is ready to be born?  How will you know if my body is ready to give birth?  Where can I get more information?  To learn more about induction, you may visit these websites:  The American College of Nurse-Midwives:  www.mymidwife.org  The American College of Obstetricians and Gynecologists: www.acog.org  Childbirth Connection:  www.childbirthconnection.org  March of Dimes: www.marchofdimes.com  Association of Women's Health, Obstetrics, and  Nursing  www.dqipyr8mwx.org/go-the-full-40&#047;  For informational purposes only. Not to replace the advice of your health care provider. Copyright   2008 [employer]. All rights reserved. Clinically reviewed by the  System Operations Leadership team. [employer] 110867 - REV .

## 2024-05-06 NOTE — PROGRESS NOTES
Assessment & Plan  Dipti Kirkpatrick is a 23 year old , at 38w6d weeks of pregnancy with ROQUE of May 14, 2024 by  11 week ultrasound.  Patient's history is high risk for the following reasons: short interval pregnancy and language barrier.     # High risk concerns (medical problems, high risk surgical/obstetric history, high risk social situation, etc):  - In addition to routine prenatal care, patient will need:   - Antibiotics in labor, GBS + (collected 24)              - went into spontaneous labor at 40 wks with previous delivery  - Problem list reviewed and updated.    Weight gain adequate: 13.8 kg (30 lb 6.4 oz) to date, out of recommended total of 25-35 lbs (pregravid BMI 18.5-24.9)    Patient Instructions   Week 39 of Your Pregnancy: Care Instructions    [place] babies can look different than what you see in pictures or movies. Their heads can be a strange shape right after birth. And they may have swollen eyes and red marks on their faces.    You can still get pregnant even if you are breastfeeding. If you don't want to get pregnant, talk to your doctor about birth control.  Tips for week 39 of pregnancy  If you plan to breastfeed, get prepared.     Continue to eat healthy foods.  Keep taking your prenatal vitamins during breastfeeding if your doctor recommends it.  Talk to your doctor before taking any medicines or supplements.  Choose the right birth control for you.     Intrauterine devices (IUDs) are placed in the uterus. Sometimes the IUD can be placed right after giving birth. They work for years.  Hormonal implants are placed under the skin of the arm. They also work for years.  Depo-Provera is a shot. You get it every 3 months.  Birth control pills can be used. They're taken every day.  Tubal ligation (tying your tubes) and vasectomy are surgeries. They're permanent.  Diaphragms, spermicide, and condoms must be used each time you have sex. If you used a diaphragm before, you should get refitted  "after the baby is born.  A birth control patch or ring can be used. These just can't be started until several weeks after you give birth.  Follow-up care is a key part of your treatment and safety. Be sure to make and go to all appointments, and call your doctor if you are having problems. It's also a good idea to know your test results and keep a list of the medicines you take.  Where can you learn more?  Go to https://www.N-[employer].net/patiented  Enter A811 in the search box to learn more about \"Week 39 of Your Pregnancy: Care Instructions.\"  Current as of: July 10, 2023               Content Version: 14.0    2445-1715 [employer].   Care instructions adapted under license by your healthcare professional. If you have questions about a medical condition or this instruction, always ask your healthcare professional. [employer] disclaims any warranty or liability for your use of this information.      Weeks 40 to 41 of Your Pregnancy: Care Instructions  By week 40, you've reached your due date. Your baby could be coming any day. Most babies born after their due dates are healthy. But you may have tests to make sure everything is okay. If you feel stressed, you could try a meditation exercise, such as guided imagery. It may help you relax.    If you are past 41 weeks, your doctor may measure the amount of fluid that surrounds your baby. They may also test your baby's movement and heart rate.    If you don't start labor on your own by 41 or 42 weeks, your doctor may want to start (induce) labor. If there are other concerns, your doctor may talk to you about a .  To start (induce) your labor, your doctor may do any of these things.    Place a narrow tube with a small balloon on the end (balloon catheter) into your cervix.  The doctor inflates the balloon. This helps your cervix open (dilate).     Sweep the membranes (separate the lining of the amniotic sac from the uterus).  This " "helps the uterus make a chemical that can start contractions.     \"Break your water\" (rupture the amniotic sac).  This may be done if your cervix has started to open.     Use medicines.  They may be used to soften the cervix or start contractions.   How to do guided imagery    Close your eyes, and take a few deep breaths. Picture a peaceful setting, such as a beach or a meadow. Add a winding path. Follow the path until you feel more and more relaxed.    Take a few minutes to breathe slowly and feel the calm. When you are ready, slowly take yourself back to the present. Count to 3, and open your eyes.  Follow-up care is a key part of your treatment and safety. Be sure to make and go to all appointments, and call your doctor if you are having problems. It's also a good idea to know your test results and keep a list of the medicines you take.  Where can you learn more?  Go to https://www.[employer].net/patiented  Enter T922 in the search box to learn more about \"Weeks 40 to 41 of Your Pregnancy: Care Instructions.\"  Current as of: July 10, 2023               Content Version: 14.0    8465-7273 [employer].   Care instructions adapted under license by your healthcare professional. If you have questions about a medical condition or this instruction, always ask your healthcare professional. [employer] disclaims any warranty or liability for your use of this information.      Labor Induction  What You Need to Know  What is labor induction?  In most cases, it is best to go into labor naturally. When labor does not start on its own, we may use medicine or other methods to start (induce) labor. This is called induction, which:  Helps the uterus contract  Thins, softens and opens the cervix (opening of the uterus)  When is induction used?  There are two types of induction.  Medical induction may be done to protect the health of the parent or baby if:  There are medical concerns for you or your " "baby.  You haven't had your baby by 41 weeks.  Induction for non-medical reasons may be done at 39 weeks or later if:  You live far from the hospital.  You've been having contractions for many days.  You've given birth quickly in the past.   We will not perform an induction for non-medical reasons before 39 weeks. Studies show that babies born before 39 weeks may struggle with breathing, feeding, sleeping and staying warm. They are more likely to have health problems and may need to stay in the hospital longer. If we start your labor for medical reasons, the benefits will outweigh these risks. We will talk to you about your risks, benefits and alternatives (other options) before we start your labor.  How is induction done?  We may start your labor by doing one or more of the following:  We may need to help your cervix soften and open (sometimes called \"ripening\") to prepare it for labor. There are two ways to do this:  Medicines--these may be in the form of pills that you swallow or medicines that are put into the vagina next to the cervix.  Mechanical--using either a single or double balloon. These are small balloons that are attached to tubes that go up inside the cervix. The balloons are then filled with water to put pressure on the cervix and help the cervix soften and open. Depending on the type of balloon used, you may be allowed to go home after it is placed.  After your cervix is ready:  We may give you medicine through an IV (a small tube placed in your vein). This medicine is called Pitocin. It helps the muscles of your uterus to contract.  We may make a small opening in your bag of water (the sac around your baby). This is called an amniotomy. Sometimes called \"breaking the water\". It may help your uterus contract and your cervix open.  What might happen if my labor is induced?  Some of this depends on how your labor is started and how your body responds. Your labor may be more complicated. You and your " baby may need more medical treatments. In general:  You may not go into labor right away. If so, we may send you home with follow-up instructions.  It will be important to monitor you and your baby during the induction.  You may not be able to move around during labor. You will have two belts with monitors attached to your belly. These allow the nurse to watch your contractions and your baby's heart rate.  Your labor may take longer than if you went into labor on your own. It might take more than one day.  If you need cervical ripening, it is important to know that it may be many hours (even days) until delivery happens.  Cervical ripening can be slow and require several doses before your body is ready to labor.  A long labor may increase the risk of infection in mother and baby.  Your labor may not progress as planned. This could lead to a  birth.  Can I plan the date of my delivery?  After 39 weeks, you may ask about planning your delivery date. This is only an option if your body--and your baby--are ready. To find out, we will check your cervix and your baby's heart rate.   If you are ready to be induced, we will give you a date and time to come to the hospital. If many patients are in labor that day, we may need to start your labor at another time.  If you are not ready, we will not start your labor. It will be safer for your baby to come on its own.  What else do I need to know?  Before you have an induction, make sure you understand the reasons, risks and benefits. Ask your doctor or nurse-midwife:  Why do I need to be induced?  What are the risks to my baby?  How will you start my labor?  How will you know if my baby is ready to be born?  How will you know if my body is ready to give birth?  Where can I get more information?  To learn more about induction, you may visit these websites:  The American College of Nurse-Midwives:  www.mymidwife.org  The American College of Obstetricians and Gynecologists:  "www.acog.org  Childbirth Connection:  www.childbirthconnection.org   Dimes: www.marchofdimes.com  Association of Women's Health, Obstetrics, and  Nursing  www.uumljq9krr.org/go-the-full-40&#047;  For informational purposes only. Not to replace the advice of your health care provider. Copyright   2008 [facility]. All rights reserved. Clinically reviewed by the  System Operations Leadership team. [employer] 680398 - REV .       Return to clinic in 1 week.    Ara Ashby DO  I precepted today with Gio Edgar MD.     Subjective  Concerns: constipation     ROS:  No - Headache  No - Changes in vision  No - Chest Pain  No - Shortness of Breath  No - Nausea   No - Vomiting  No - Abdominal pain   No - Contractions  No - Dysuria   No - Vaginal Discharge  - extra, thicker, odor   No - Vaginal bleeding   No - Loss of Fluid   No - Extremity swelling   Present - Fetal movement       Going to [place]? Yes      Patient Active Problem List   Diagnosis    Language barrier, cultural differences    Mass of right axilla    Short interval between pregnancies affecting pregnancy, antepartum    Supervision of high risk pregnancy, antepartum    GBS (group B Streptococcus carrier), +RV culture, currently pregnant       Dipti Kirkpatrick speaks Hmong so an  was used today.    Guidance:  post-partum care  GBS- was positive with last pregnancy, adequately treated with antibiotics in labor   signs of labor- reviewed, she feels comfortable identifying these from previous pregnancy/birth  pain control during labor- un-medicated last time, undecided at this time but considering IV Fentanyl or NO gas    Do you need help getting a car seat? No  Do you need help getting a breast pump? No      Objective  /65   Pulse 84   Temp 98.6  F (37  C) (Oral)   Resp 20   Ht 1.473 m (4' 10\")   Wt 64.6 kg (142 lb 6.4 oz)   LMP 2023   SpO2 97%   BMI 29.76 kg/m    No distress.  Gravid abdomen.  " -135.  Fundal height 39.5 cm.  no edema.      Results  Blood type: B POS  No results found for any visits on 05/06/24.

## 2024-05-08 ENCOUNTER — VIRTUAL VISIT (OUTPATIENT)
Dept: INTERPRETER SERVICES | Facility: CLINIC | Age: 24
End: 2024-05-08
Payer: COMMERCIAL

## 2024-05-08 ENCOUNTER — HOSPITAL ENCOUNTER (INPATIENT)
Facility: CLINIC | Age: 24
LOS: 2 days | Discharge: HOME | End: 2024-05-10
Attending: STUDENT IN AN ORGANIZED HEALTH CARE EDUCATION/TRAINING PROGRAM | Admitting: STUDENT IN AN ORGANIZED HEALTH CARE EDUCATION/TRAINING PROGRAM
Payer: COMMERCIAL

## 2024-05-08 ENCOUNTER — TELEPHONE (OUTPATIENT)
Dept: FAMILY MEDICINE | Facility: CLINIC | Age: 24
End: 2024-05-08
Payer: COMMERCIAL

## 2024-05-08 ENCOUNTER — VIRTUAL VISIT (OUTPATIENT)
Dept: INTERPRETER SERVICES | Facility: CLINIC | Age: 24
End: 2024-05-08

## 2024-05-08 PROBLEM — Z34.90 PREGNANCY: Status: ACTIVE | Noted: 2024-05-08

## 2024-05-08 PROBLEM — Z36.89 ENCOUNTER FOR TRIAGE IN PREGNANT PATIENT: Status: ACTIVE | Noted: 2024-05-08

## 2024-05-08 LAB
ABO/RH(D): NORMAL
ANTIBODY SCREEN: NEGATIVE
HGB BLD-MCNC: 13.6 G/DL (ref 11.7–15.7)
SPECIMEN EXPIRATION DATE: NORMAL

## 2024-05-08 PROCEDURE — 250N000011 HC RX IP 250 OP 636

## 2024-05-08 PROCEDURE — 86900 BLOOD TYPING SEROLOGIC ABO: CPT

## 2024-05-08 PROCEDURE — 120N000001 HC R&B MED SURG/OB

## 2024-05-08 PROCEDURE — 0KQM0ZZ REPAIR PERINEUM MUSCLE, OPEN APPROACH: ICD-10-PCS | Performed by: STUDENT IN AN ORGANIZED HEALTH CARE EDUCATION/TRAINING PROGRAM

## 2024-05-08 PROCEDURE — 250N000013 HC RX MED GY IP 250 OP 250 PS 637

## 2024-05-08 PROCEDURE — T1013 SIGN LANG/ORAL INTERPRETER: HCPCS | Mod: GT,TEL,95 | Performed by: INTERPRETER

## 2024-05-08 PROCEDURE — 85018 HEMOGLOBIN: CPT

## 2024-05-08 PROCEDURE — 250N000009 HC RX 250

## 2024-05-08 PROCEDURE — 258N000003 HC RX IP 258 OP 636

## 2024-05-08 PROCEDURE — 36415 COLL VENOUS BLD VENIPUNCTURE: CPT

## 2024-05-08 PROCEDURE — 59400 OBSTETRICAL CARE: CPT | Mod: GC

## 2024-05-08 PROCEDURE — 10907ZC DRAINAGE OF AMNIOTIC FLUID, THERAPEUTIC FROM PRODUCTS OF CONCEPTION, VIA NATURAL OR ARTIFICIAL OPENING: ICD-10-PCS | Performed by: STUDENT IN AN ORGANIZED HEALTH CARE EDUCATION/TRAINING PROGRAM

## 2024-05-08 PROCEDURE — T1013 SIGN LANG/ORAL INTERPRETER: HCPCS | Mod: U4,TEL,95 | Performed by: INTERPRETER

## 2024-05-08 PROCEDURE — 722N000001 HC LABOR CARE VAGINAL DELIVERY SINGLE

## 2024-05-08 RX ORDER — NALOXONE HYDROCHLORIDE 0.4 MG/ML
0.2 INJECTION, SOLUTION INTRAMUSCULAR; INTRAVENOUS; SUBCUTANEOUS
Status: DISCONTINUED | OUTPATIENT
Start: 2024-05-08 | End: 2024-05-08 | Stop reason: HOSPADM

## 2024-05-08 RX ORDER — METOCLOPRAMIDE HYDROCHLORIDE 5 MG/ML
10 INJECTION INTRAMUSCULAR; INTRAVENOUS EVERY 6 HOURS PRN
Status: DISCONTINUED | OUTPATIENT
Start: 2024-05-08 | End: 2024-05-08 | Stop reason: HOSPADM

## 2024-05-08 RX ORDER — ONDANSETRON 4 MG/1
4 TABLET, ORALLY DISINTEGRATING ORAL EVERY 6 HOURS PRN
Status: DISCONTINUED | OUTPATIENT
Start: 2024-05-08 | End: 2024-05-08 | Stop reason: HOSPADM

## 2024-05-08 RX ORDER — PROCHLORPERAZINE 25 MG
25 SUPPOSITORY, RECTAL RECTAL EVERY 12 HOURS PRN
Status: DISCONTINUED | OUTPATIENT
Start: 2024-05-08 | End: 2024-05-08 | Stop reason: HOSPADM

## 2024-05-08 RX ORDER — TRANEXAMIC ACID 10 MG/ML
1 INJECTION, SOLUTION INTRAVENOUS EVERY 30 MIN PRN
Status: DISCONTINUED | OUTPATIENT
Start: 2024-05-08 | End: 2024-05-10 | Stop reason: HOSPADM

## 2024-05-08 RX ORDER — METHYLERGONOVINE MALEATE 0.2 MG/ML
200 INJECTION INTRAVENOUS
Status: DISCONTINUED | OUTPATIENT
Start: 2024-05-08 | End: 2024-05-08 | Stop reason: HOSPADM

## 2024-05-08 RX ORDER — LOPERAMIDE HCL 2 MG
4 CAPSULE ORAL
Status: DISCONTINUED | OUTPATIENT
Start: 2024-05-08 | End: 2024-05-08 | Stop reason: HOSPADM

## 2024-05-08 RX ORDER — METHYLERGONOVINE MALEATE 0.2 MG/ML
200 INJECTION INTRAVENOUS
Status: DISCONTINUED | OUTPATIENT
Start: 2024-05-08 | End: 2024-05-10 | Stop reason: HOSPADM

## 2024-05-08 RX ORDER — PROCHLORPERAZINE MALEATE 10 MG
10 TABLET ORAL EVERY 6 HOURS PRN
Status: DISCONTINUED | OUTPATIENT
Start: 2024-05-08 | End: 2024-05-08 | Stop reason: HOSPADM

## 2024-05-08 RX ORDER — OXYTOCIN 10 [USP'U]/ML
10 INJECTION, SOLUTION INTRAMUSCULAR; INTRAVENOUS
Status: DISCONTINUED | OUTPATIENT
Start: 2024-05-08 | End: 2024-05-10 | Stop reason: HOSPADM

## 2024-05-08 RX ORDER — KETOROLAC TROMETHAMINE 30 MG/ML
30 INJECTION, SOLUTION INTRAMUSCULAR; INTRAVENOUS
Status: DISCONTINUED | OUTPATIENT
Start: 2024-05-08 | End: 2024-05-10 | Stop reason: HOSPADM

## 2024-05-08 RX ORDER — FENTANYL CITRATE 50 UG/ML
50 INJECTION, SOLUTION INTRAMUSCULAR; INTRAVENOUS EVERY 30 MIN PRN
Status: DISCONTINUED | OUTPATIENT
Start: 2024-05-08 | End: 2024-05-08 | Stop reason: HOSPADM

## 2024-05-08 RX ORDER — LIDOCAINE 40 MG/G
CREAM TOPICAL
Status: DISCONTINUED | OUTPATIENT
Start: 2024-05-08 | End: 2024-05-08 | Stop reason: HOSPADM

## 2024-05-08 RX ORDER — ACETAMINOPHEN 325 MG/1
650 TABLET ORAL EVERY 4 HOURS PRN
Status: DISCONTINUED | OUTPATIENT
Start: 2024-05-08 | End: 2024-05-08 | Stop reason: HOSPADM

## 2024-05-08 RX ORDER — MISOPROSTOL 200 UG/1
400 TABLET ORAL
Status: DISCONTINUED | OUTPATIENT
Start: 2024-05-08 | End: 2024-05-08 | Stop reason: HOSPADM

## 2024-05-08 RX ORDER — IBUPROFEN 800 MG/1
800 TABLET, FILM COATED ORAL
Status: DISCONTINUED | OUTPATIENT
Start: 2024-05-08 | End: 2024-05-10 | Stop reason: HOSPADM

## 2024-05-08 RX ORDER — PENICILLIN G 3000000 [IU]/50ML
3 INJECTION, SOLUTION INTRAVENOUS EVERY 4 HOURS
Status: DISCONTINUED | OUTPATIENT
Start: 2024-05-08 | End: 2024-05-08 | Stop reason: HOSPADM

## 2024-05-08 RX ORDER — HYDROCORTISONE 25 MG/G
CREAM TOPICAL 3 TIMES DAILY PRN
Status: DISCONTINUED | OUTPATIENT
Start: 2024-05-08 | End: 2024-05-10 | Stop reason: HOSPADM

## 2024-05-08 RX ORDER — OXYTOCIN/0.9 % SODIUM CHLORIDE 30/500 ML
340 PLASTIC BAG, INJECTION (ML) INTRAVENOUS CONTINUOUS PRN
Status: DISCONTINUED | OUTPATIENT
Start: 2024-05-08 | End: 2024-05-08 | Stop reason: HOSPADM

## 2024-05-08 RX ORDER — LOPERAMIDE HCL 2 MG
2 CAPSULE ORAL
Status: DISCONTINUED | OUTPATIENT
Start: 2024-05-08 | End: 2024-05-08 | Stop reason: HOSPADM

## 2024-05-08 RX ORDER — CARBOPROST TROMETHAMINE 250 UG/ML
250 INJECTION, SOLUTION INTRAMUSCULAR
Status: DISCONTINUED | OUTPATIENT
Start: 2024-05-08 | End: 2024-05-08 | Stop reason: HOSPADM

## 2024-05-08 RX ORDER — CARBOPROST TROMETHAMINE 250 UG/ML
250 INJECTION, SOLUTION INTRAMUSCULAR
Status: DISCONTINUED | OUTPATIENT
Start: 2024-05-08 | End: 2024-05-10 | Stop reason: HOSPADM

## 2024-05-08 RX ORDER — TRANEXAMIC ACID 10 MG/ML
1 INJECTION, SOLUTION INTRAVENOUS EVERY 30 MIN PRN
Status: DISCONTINUED | OUTPATIENT
Start: 2024-05-08 | End: 2024-05-08 | Stop reason: HOSPADM

## 2024-05-08 RX ORDER — PENICILLIN G POTASSIUM 5000000 [IU]/1
5 INJECTION, POWDER, FOR SOLUTION INTRAMUSCULAR; INTRAVENOUS ONCE
Status: COMPLETED | OUTPATIENT
Start: 2024-05-08 | End: 2024-05-08

## 2024-05-08 RX ORDER — MISOPROSTOL 200 UG/1
800 TABLET ORAL
Status: DISCONTINUED | OUTPATIENT
Start: 2024-05-08 | End: 2024-05-08 | Stop reason: HOSPADM

## 2024-05-08 RX ORDER — MISOPROSTOL 200 UG/1
800 TABLET ORAL
Status: DISCONTINUED | OUTPATIENT
Start: 2024-05-08 | End: 2024-05-10 | Stop reason: HOSPADM

## 2024-05-08 RX ORDER — IBUPROFEN 800 MG/1
800 TABLET, FILM COATED ORAL EVERY 6 HOURS PRN
Status: DISCONTINUED | OUTPATIENT
Start: 2024-05-08 | End: 2024-05-10 | Stop reason: HOSPADM

## 2024-05-08 RX ORDER — NALOXONE HYDROCHLORIDE 0.4 MG/ML
0.4 INJECTION, SOLUTION INTRAMUSCULAR; INTRAVENOUS; SUBCUTANEOUS
Status: DISCONTINUED | OUTPATIENT
Start: 2024-05-08 | End: 2024-05-08 | Stop reason: HOSPADM

## 2024-05-08 RX ORDER — LOPERAMIDE HCL 2 MG
2 CAPSULE ORAL
Status: DISCONTINUED | OUTPATIENT
Start: 2024-05-08 | End: 2024-05-10 | Stop reason: HOSPADM

## 2024-05-08 RX ORDER — MISOPROSTOL 200 UG/1
400 TABLET ORAL
Status: DISCONTINUED | OUTPATIENT
Start: 2024-05-08 | End: 2024-05-10 | Stop reason: HOSPADM

## 2024-05-08 RX ORDER — OXYTOCIN/0.9 % SODIUM CHLORIDE 30/500 ML
340 PLASTIC BAG, INJECTION (ML) INTRAVENOUS CONTINUOUS PRN
Status: DISCONTINUED | OUTPATIENT
Start: 2024-05-08 | End: 2024-05-10 | Stop reason: HOSPADM

## 2024-05-08 RX ORDER — OXYTOCIN/0.9 % SODIUM CHLORIDE 30/500 ML
100-340 PLASTIC BAG, INJECTION (ML) INTRAVENOUS CONTINUOUS PRN
Status: DISCONTINUED | OUTPATIENT
Start: 2024-05-08 | End: 2024-05-10 | Stop reason: HOSPADM

## 2024-05-08 RX ORDER — ONDANSETRON 2 MG/ML
4 INJECTION INTRAMUSCULAR; INTRAVENOUS EVERY 6 HOURS PRN
Status: DISCONTINUED | OUTPATIENT
Start: 2024-05-08 | End: 2024-05-08 | Stop reason: HOSPADM

## 2024-05-08 RX ORDER — OXYTOCIN 10 [USP'U]/ML
10 INJECTION, SOLUTION INTRAMUSCULAR; INTRAVENOUS
Status: DISCONTINUED | OUTPATIENT
Start: 2024-05-08 | End: 2024-05-08 | Stop reason: HOSPADM

## 2024-05-08 RX ORDER — BISACODYL 10 MG
10 SUPPOSITORY, RECTAL RECTAL DAILY PRN
Status: DISCONTINUED | OUTPATIENT
Start: 2024-05-08 | End: 2024-05-10 | Stop reason: HOSPADM

## 2024-05-08 RX ORDER — CITRIC ACID/SODIUM CITRATE 334-500MG
30 SOLUTION, ORAL ORAL
Status: DISCONTINUED | OUTPATIENT
Start: 2024-05-08 | End: 2024-05-08 | Stop reason: HOSPADM

## 2024-05-08 RX ORDER — DOCUSATE SODIUM 100 MG/1
100 CAPSULE, LIQUID FILLED ORAL DAILY
Status: DISCONTINUED | OUTPATIENT
Start: 2024-05-08 | End: 2024-05-10 | Stop reason: HOSPADM

## 2024-05-08 RX ORDER — LOPERAMIDE HCL 2 MG
4 CAPSULE ORAL
Status: DISCONTINUED | OUTPATIENT
Start: 2024-05-08 | End: 2024-05-10 | Stop reason: HOSPADM

## 2024-05-08 RX ORDER — MODIFIED LANOLIN
OINTMENT (GRAM) TOPICAL
Status: DISCONTINUED | OUTPATIENT
Start: 2024-05-08 | End: 2024-05-10 | Stop reason: HOSPADM

## 2024-05-08 RX ORDER — ACETAMINOPHEN 325 MG/1
650 TABLET ORAL EVERY 4 HOURS PRN
Status: DISCONTINUED | OUTPATIENT
Start: 2024-05-08 | End: 2024-05-10 | Stop reason: HOSPADM

## 2024-05-08 RX ORDER — METOCLOPRAMIDE 10 MG/1
10 TABLET ORAL EVERY 6 HOURS PRN
Status: DISCONTINUED | OUTPATIENT
Start: 2024-05-08 | End: 2024-05-08 | Stop reason: HOSPADM

## 2024-05-08 RX ADMIN — Medication 340 ML/HR: at 13:59

## 2024-05-08 RX ADMIN — PENICILLIN G POTASSIUM 5 MILLION UNITS: 5000000 POWDER, FOR SOLUTION INTRAMUSCULAR; INTRAPLEURAL; INTRATHECAL; INTRAVENOUS at 11:19

## 2024-05-08 RX ADMIN — LIDOCAINE HYDROCHLORIDE 10 ML: 10 INJECTION, SOLUTION INFILTRATION; PERINEURAL at 13:59

## 2024-05-08 RX ADMIN — DOCUSATE SODIUM 100 MG: 100 CAPSULE, LIQUID FILLED ORAL at 16:57

## 2024-05-08 RX ADMIN — SODIUM CHLORIDE, POTASSIUM CHLORIDE, SODIUM LACTATE AND CALCIUM CHLORIDE 1000 ML: 600; 310; 30; 20 INJECTION, SOLUTION INTRAVENOUS at 11:18

## 2024-05-08 RX ADMIN — IBUPROFEN 800 MG: 800 TABLET ORAL at 16:56

## 2024-05-08 ASSESSMENT — ACTIVITIES OF DAILY LIVING (ADL)
ADLS_ACUITY_SCORE: 20
ADLS_ACUITY_SCORE: 35
ADLS_ACUITY_SCORE: 20

## 2024-05-08 NOTE — PLAN OF CARE
Goal Outcome Evaluation:      Plan of Care Reviewed With: patient, spouse    Overall Patient Progress: improvingOverall Patient Progress: improving       Voided x2, lochia light. Breast feeding infant independently.

## 2024-05-08 NOTE — TELEPHONE ENCOUNTER
Called patient back after they called [facility] Labor and Delivery.  Spoke to , María, who states contractions began at 7 AM today.  They are now becoming worse.  Patient has to breathe through contractions and is unable to stand up when they are happening.  There has been no gush of fluid or vaginal bleeding.  Patient informed to come to [facility] labor and delivery for further evaluation.    Ruthie Gamble MD, PGY3  [facility]

## 2024-05-08 NOTE — TELEPHONE ENCOUNTER
Pt is 39w1d and  states that she has been having contractions since 7:00 AM that have gotten stronger and closer together.  She is feeling pressure.  Denies vaginal bleeding or fluid leaking.  Baby is active.   states that he also called [facility] and they told him to bring her in for eval.  He states that they will go now.  Routed note to Dr Ashby./Corazon Singh RN BSN

## 2024-05-08 NOTE — L&D DELIVERY NOTE
OB Vaginal Delivery Note    Dipti Kirkpatrick MRN# [id number]   Age: 23 year old YOB: 2000       GA: 39w1d  GP:   Labor Complications: None  Delivery QBL:  260 mL  Delivery Type: Vaginal, Spontaneous   ROM to Delivery Time: (Delivered) Minutes: 2  [place] Weight: 7lbs 10 oz   1 Minute 5 Minute 10 Minute   Apgar Totals: 9   9        AGNES COOMBS;ERNST EVERETT;ANMOL ESCALERA     Delivery Details:  On 2024 Dipti Kirkpatrick delivered a viable female infant at 39w1d with apgars of 9 and 9 via Vaginal, Spontaneous Delivery.  Prenatal course was notable for short interval pregnancy, GBS positive, and non-English speaking.    Stage 1: [facility] with spontaneous contractions. Her group B Strep (GBS) carrier status was negative and inadequately treated. She received nothing for induction/augmentation.  Labor course was notable for inadequate GBS treatment.    Stage 2: Delivery was via vaginal, spontaneous  to a sterile field under no anesthesia. Patient pushed twice. Infant delivered in vertex  left  occiput  anterior position. Shoulders delivered without difficulty. The baby was placed on the patient's abdomen and demonstrated a spontaneous cry and vigorous tone.  No resuscitation was needed.  Cord complications: none . Delayed cord clamping was performed.  The cord was doubly clamped and cut 3 vessels  were noted.     Stage 3: Placenta delivered intact at 2024  2:01 PM . Placental disposition was Hospital disposal . Fundal massage performed and fundus found to be firm. The following uterotonics were given: Pitocin (IM). Perineum, vagina, cervix were inspected, and the following lacerations were noted: 2nd degree perineal. Lacerations were repaired in the usual fashion using 3-0 Vicryl. QBL: 260 mL.    Excellent hemostasis was noted. Needle and sponge count correct. Infant and patient in delivery room in good and stable condition.        Anton Kirkpatrick-Dipti  [[id number]]      Labor Events     labor?: No   steroids: None  Labor Type: Spontaneous  Predominate monitoring during 1st stage: intermittent auscultation     Antibiotics received during labor?: Yes  Reason for Antibiotics: GBS  Antibiotics received for GBS: Penicillin  Antibiotics Given (GBS): Less than or equal to 4 hours prior to delivery       Rupture date/time: 24 1351   Rupture type: Artificial Rupture of Membranes  Fluid color: Clear  Fluid odor: Normal     Augmentation: None       Delivery/Placenta Date and Time      Delivery Date: 24 Delivery Time:  1:53 PM   Placenta Date/Time: 2024  2:01 PM  Oxytocin given at the time of delivery: after delivery of baby  Delivering clinician: Ruthie Gamble MD   Other personnel present at delivery:  Provider Role   Bekah Gillis MD Family Medicine Physician   Basilia Everett RN Registered Nurse   Alex Burris RN Registered Nurse             Vaginal Counts       Initial count performed by 2 team members:  Two Team Members   ANTHONY Gamble MD, C. RN         Needles Suture Needles Sponges (RETIRED) Instruments   Initial counts 1 1 5    Added to count       Relief counts       Final counts               Placed during labor Accounted for at the end of labor   FSE No NA   IUPC No NA   Cervidil No NA           Final count performed by 2 team members:  Two Team Members   ANTHONY Gamble MD, C. RN      Final count correct?: Yes  Pre-Birth Team Brief: Complete  Post-Birth Team Debrief: Complete    Apgars    Living status: Living   1 Minute 5 Minute 10 Minute 15 Minute 20 Minute   Skin color: 1  1       Heart rate: 2  2       Reflex irritability: 2  2       Muscle tone: 2  2       Respiratory effort: 2  2       Total: 9  9       Apgars assigned by: JUDY EVERETT RN       Cord      Vessels: 3 Vessels    Cord Complications: None               Cord Blood Disposition: Discard    Gases Sent?: No    Delayed cord clamping?: Yes    Cord  Clamping Delay (seconds):  seconds    Stem cell collection?: No      [place] Resuscitation    Methods: None    Skin to Skin and Feeding Plan      Skin to skin initiation date/time: 1841    Skin to skin with: Mother  Skin to skin end date/time:       Labor Events and Shoulder Dystocia    Fetal Tracing Prior to Delivery: Category 1    Delivery (Maternal) (Provider to Complete) ([id number])    Episiotomy: None  Perineal lacerations: 2nd Repaired?: Yes   Repair suture: 3-0 Vicryl  Number of repair packets: 1  Genital tract inspection done: Pos       Blood Loss  Mother: Dipti Kirkpatrick #[id number]     Start of Mother's Information      Delivery Blood Loss  24 0153 - 24 1442      None       End of Mother's Information  Mother: Dipti Kirkpatrick #[id number]      Delivery - Provider to Complete ([id number])    Delivering clinician: Ruthie Gamble MD  Delivery Type (Choose the 1 that will go to the Birth History): Vaginal, Spontaneous    Other personnel:  Provider Role   Bekah Gillis MD Family Medicine Physician   Basilia Aaron, RN Registered Nurse   Alex Burris RN Registered Nurse               Placenta    Date/Time: 2024  2:01 PM  Removal: Spontaneous  Disposition: Hospital disposal      Anesthesia    Method: None      Presentation and Position    Presentation: Vertex    Position: Left Occiput Anterior          Delivery was attended by attending provider, Dr. Bekah Gillis.    Ruthie Gamble MD PGY3  [facility] Family Medicine Resident

## 2024-05-08 NOTE — H&P
OBSTETRICS ADMISSION HISTORY & PHYSICAL  DATE OF ADMISSION: 2024 10:37 AM        Assessment and Plan:   Assessment:  Dipti Kirkpatrick is a 23 year old  at 39w1d in active labor. Membranes are intact. GBS status is positive.   Patient Active Problem List   Diagnosis    Language barrier, cultural differences    Mass of right axilla    Short interval between pregnancies affecting pregnancy, antepartum    Supervision of high risk pregnancy, antepartum    GBS (group B Streptococcus carrier), +RV culture, currently pregnant    Encounter for triage in pregnant patient    Pregnancy        PLAN:   Admit to inpatient  GBS: positive. Antibiotics are indicated and to be started now.  Comfort plan: natural  Anticipate vaginal delivery  Will monitor labor progress along with RN and update attending physician  Will notify Ara Pedro.    Patient discussed with attending physician, Dr. Bekah Gillis, who agrees with the plan.     Ruthie Gamble MD PGY-3,  2024  [facility] Family Medicine Residency Program         Chief Complaint:     Contractions in pregnancy       History of Present Illness:     Dipti Kirkpatrick is a 23 year old year old  at 39w1d confirmed by 11 week ultrasound. Patient received prenatal care with Ara Ashby at [facility].      Presents to the [facility] for active labor management.      She reports regular contractions starting at 0700 today, and occurring every 5 minutes. She denies fluid leakage. She denies bleeding per vagina. Fetal movement is normal.    Her prenatal course has been complicated by short interval pregnancy and GBS positivity.    Prenatal labs   Lab Results   Component Value Date    AS Negative 2023    HEPBANG Nonreactive 2023    CHPCRT Negative 2023    GCPCRT Negative 2023    HGB 11.3 (L) 2024       GBS was POSITIVE and collected on 24.  Weight gain during pregnancy: 13.8 kg (30 lb 6.4 oz)          Obstetrical History:     OB History    Para Term  AB Living   2 1 1 0 0 1   SAB IAB Ectopic Multiple Live Births   0 0 0 0 1      # Outcome Date GA Lbr Julio C/2nd Weight Sex Type Anes PTL Lv   2 Current            1 Term 23 40w2d 13:55 / 00:27 3.12 kg (6 lb 14.1 oz) F Vag-Spont None N EVARISTO      Name: Riya Flores      Apgar1: 8  Apgar5: 9          Immunzations:     Most Recent Immunizations   Administered Date(s) Administered    COVID-19 Monovalent 12+ (Pfizer ) 2022    COVID-19 Vaccine (Riaz) 2021    HPV9 2023    Hepatitis B, Adult 2023    Influenza Vaccine >6 months,quad, PF 10/05/2022    TDAP (Adacel,Boostrix) 03/15/2024     Tdap this pregnancy?  YES - Date: 3/15/2024  Flu shot this pregnancy?NO  COVID vaccine? NO         Past Medical History:     Past Medical History:   Diagnosis Date    Known health problems: none             Past Surgical History:     Past Surgical History:   Procedure Laterality Date    NO HISTORY OF SURGERY              Family History:   No family history on file.         Social History:   no tobacco use  no alcohol use  no illicit drug use         Medications:     Current Facility-Administered Medications   Medication Dose Route Frequency Provider Last Rate Last Admin    acetaminophen (TYLENOL) tablet 650 mg  650 mg Oral Q4H PRN Ruthie Gamble MD        carboprost (HEMABATE) injection 250 mcg  250 mcg Intramuscular Q15 Min PRN Ruthie Gamble MD        And    loperamide (IMODIUM) capsule 4 mg  4 mg Oral Once PRN Ruthie Gamble MD        fentaNYL (PF) (SUBLIMAZE) injection 50 mcg  50 mcg Intravenous Q30 Min PRN Ruthie Gamble MD        ketorolac (TORADOL) injection 30 mg  30 mg Intravenous Once PRN Ruthie Gamble MD        Or    ketorolac (TORADOL) injection 30 mg  30 mg Intramuscular Once PRN Ruthie Gamble MD        Or    ibuprofen (ADVIL/MOTRIN) tablet 800 mg  800 mg Oral Once PRN Ruthie Gamble MD        lactated ringers BOLUS 1,000 mL  1,000 mL  Intravenous Once PRN Ruthie Gamble MD        Or    lactated ringers BOLUS 500 mL  500 mL Intravenous Once PRN Ruthie Gamble MD        lidocaine 1 % 0.1-20 mL  0.1-20 mL Subcutaneous Once PRN Ruthie Gamble MD        loperamide (IMODIUM) capsule 2 mg  2 mg Oral Q2H PRN Ruthie Gamble MD        methylergonovine (METHERGINE) injection 200 mcg  200 mcg Intramuscular Q2H PRN Ruthie Gamble MD        metoclopramide (REGLAN) injection 10 mg  10 mg Intravenous Q6H PRN Ruthie Gamble MD        Or    metoclopramide (REGLAN) tablet 10 mg  10 mg Oral Q6H PRN Ruthie Gamble MD        misoprostol (CYTOTEC) tablet 400 mcg  400 mcg Oral ONCE PRN REPEAT PER INSTRUCTIONS Ruthie Gamble MD        Or    misoprostol (CYTOTEC) tablet 800 mcg  800 mcg Rectal ONCE PRN REPEAT PER INSTRUCTIONS Ruthie Gamble MD        naloxone (NARCAN) injection 0.2 mg  0.2 mg Intravenous Q2 Min PRN Ruthie Gamble MD        Or    naloxone (NARCAN) injection 0.4 mg  0.4 mg Intravenous Q2 Min PRN Ruthie Gamble MD        Or    naloxone (NARCAN) injection 0.2 mg  0.2 mg Intramuscular Q2 Min PRN Ruthie Gamble MD        Or    naloxone (NARCAN) injection 0.4 mg  0.4 mg Intramuscular Q2 Min PRN Ruthie Gamble MD        nitrous oxide/oxygen 50/50 blend   Inhalation Continuous PRN Ruthie Gamble MD        ondansetron (ZOFRAN ODT) ODT tab 4 mg  4 mg Oral Q6H PRN Ruthie Gamble MD        Or    ondansetron (ZOFRAN) injection 4 mg  4 mg Intravenous Q6H PRN Ruthie Gamble MD        oxytocin (PITOCIN) 30 units in 500 mL 0.9% NaCl infusion  100-340 mL/hr Intravenous Continuous PRN Ruthie Gamble MD        oxytocin (PITOCIN) 30 units in 500 mL 0.9% NaCl infusion  340 mL/hr Intravenous Continuous PRN Ruthie Gamble MD        oxytocin (PITOCIN) injection 10 Units  10 Units Intramuscular Once PRN Ruthie Gamble MD        oxytocin (PITOCIN) injection 10 Units  10 Units Intramuscular Once PRN Ruthie Gamble MD        penicillin G potassium 5 million units vial to attach to NS  "100 mL bag  5 Million Units Intravenous Once Ruthie Gamble MD        Followed by    penicillin G potassium 3 Million Units in D5W 50 mL intermittent infusion  3 Million Units Intravenous Q4H Ruthie Gamble MD        prochlorperazine (COMPAZINE) injection 10 mg  10 mg Intravenous Q6H PRN Ruthie Gamble MD        Or    prochlorperazine (COMPAZINE) tablet 10 mg  10 mg Oral Q6H PRN Ruthie Gamble MD        Or    prochlorperazine (COMPAZINE) suppository 25 mg  25 mg Rectal Q12H PRN Ruthie Gamble MD        sodium citrate-citric acid (BICITRA) solution 30 mL  30 mL Oral Once PRN Ruthie Gamble MD        tranexamic acid 1 g in 100 mL NS IV bag (premix)  1 g Intravenous Q30 Min PRN Ruthie Gamble MD                Allergies:   Other food allergy         Review of Systems:   CONSTITUTIONAL: no unexpected change in weight  SKIN: no worrisome rashes or lesions  EYES: no acute vision problems or changes  ENT: no ear problems, no mouth problems, no throat problems  RESP: no significant cough, no shortness of breath  CV: no chest pain, no palpitations, no new or worsening peripheral edema  GI: no nausea, no vomiting, no constipation, no diarrhea  : no frequency, no dysuria, no hematuria  NEURO: no weakness, no dizziness, no headaches  ENDOCRINE: no temperature intolerance, no skin/hair changes         Physical Exam:   Vitals:   LMP 08/02/2023   0 lbs 0 oz  Estimated body mass index is 29.76 kg/m  as calculated from the following:    Height as of 5/6/24: 1.473 m (4' 10\").    Weight as of 5/6/24: 64.6 kg (142 lb 6.4 oz).    GEN: Awake, alert in no apparent distress   HEENT: grossly normal  RESPIRATORY: Speaking in normal sentences, no cough, no wheezing  ABDOMEN: gravid  PELVIC:  no fluid noted, no blood noted.  Presenting part: head.  Cervix: 5/70/0  EXT:  no edema or calf tenderness  EFW on Exam:1956 g (26%) on 3/25/24    Electronic Fetal Monitoring:  Baseline rate normal  Variability moderate  Accelerations " present  Decelerations not present    Assessment: Category I EFM interpretation     Uterine Activity irregular.    Strip reviewed on unit    NST interpretation:  Baseline rate 130 normal  Accelerations present  Decelerations not present  Interpretation: reactive

## 2024-05-08 NOTE — PROGRESS NOTES
Data: Patient presented to [facility]: 2024 10:37 AM.  Reason for maternal/fetal assessment is uterine contractions. Patient reports Contractions that started at 0700. Patient denies leaking of vaginal fluid/rupture of membranes, vaginal bleeding, nausea, vomiting, headache, visual disturbances, epigastric or RUQ pain, significant edema. Patient reports fetal movement is normal. Patient is a 39w1d . Prenatal record reviewed. Pregnancy has been uncomplicated. Support person is present.     Fetal HR baseline was  , variability is  . Accelerations:  . Decelerations:  . Uterine assessment is   during contractions and   at rest. Cervix   cm dilated and   effaced. Fetal station  . Fetal presentation   per cervical exam. Membranes: intact.    Vital signs wnl. Patient reports pain and is coping.     Action: Verbal consent for EFM. Triage assessment completed. Patient may meet criteria for early labor discharge.     Response: Patient verbalized understanding of triage assessment. Will contact Dr. Contreras with assessment and consideration of early labor discharge vs admission.

## 2024-05-09 ENCOUNTER — VIRTUAL VISIT (OUTPATIENT)
Dept: INTERPRETER SERVICES | Facility: CLINIC | Age: 24
End: 2024-05-09
Payer: COMMERCIAL

## 2024-05-09 ENCOUNTER — MEDICAL CORRESPONDENCE (OUTPATIENT)
Dept: HEALTH INFORMATION MANAGEMENT | Facility: CLINIC | Age: 24
End: 2024-05-09

## 2024-05-09 PROCEDURE — 250N000013 HC RX MED GY IP 250 OP 250 PS 637

## 2024-05-09 PROCEDURE — T1013 SIGN LANG/ORAL INTERPRETER: HCPCS | Mod: U4,TEL,95

## 2024-05-09 PROCEDURE — 120N000001 HC R&B MED SURG/OB

## 2024-05-09 RX ADMIN — IBUPROFEN 800 MG: 800 TABLET ORAL at 04:01

## 2024-05-09 RX ADMIN — ACETAMINOPHEN 650 MG: 325 TABLET ORAL at 17:52

## 2024-05-09 RX ADMIN — IBUPROFEN 800 MG: 800 TABLET ORAL at 17:52

## 2024-05-09 RX ADMIN — ACETAMINOPHEN 650 MG: 325 TABLET ORAL at 04:01

## 2024-05-09 RX ADMIN — DOCUSATE SODIUM 100 MG: 100 CAPSULE, LIQUID FILLED ORAL at 18:00

## 2024-05-09 ASSESSMENT — ACTIVITIES OF DAILY LIVING (ADL)
ADLS_ACUITY_SCORE: 20

## 2024-05-09 NOTE — PLAN OF CARE
"Day RN (0700-1930)    Hmong speaking, utilized  services whenever family is not at bedside.  VSS and afebrile.  Pt experiencing cramping during breastfeeding and nipple discomfort - pain managed adequately with prn PO ibuprofen and tylenol.  2nd degree laceration healing wdl.  Up independently - steady.  Voiding adequately.  At first declined a stool softener this morning stating \"I have powder medicine I'll take at home\", later this afternoon requested a stool softener and RN gave PO Colace and told family that this RN will call to get an order from it if pt would really like it.  Tolerating regular diet well.  Fundus is firm, midline and -1.  Lochia wdl - no clots.  Formula/breast feeding baby, bonding well.  Offered breastfeeding support at any time, also offered lactation consultant check in which patient declined stating \"that's not necessary, I did it with my first child\".  Significant other at bedside at times - supportive of patient.  Plan is home with infant and family on discharge.  Will continue to monitor.         Care Plan Problem    Problem: Adult Inpatient Plan of Care  Goal: Optimal Comfort and Wellbeing  Outcome: Progressing  Intervention: Monitor Pain and Promote Comfort  Recent Flowsheet Documentation  Taken 5/9/2024 0814 by Senia Alonzo, RN  Pain Management Interventions:   pain management plan reviewed with patient/caregiver   medication offered but refused  Intervention: Provide Person-Centered Care  Recent Flowsheet Documentation  Taken 5/9/2024 0815 by Senia Alonzo RN  Trust Relationship/Rapport:   care explained   choices provided   emotional support provided   empathic listening provided   questions answered   questions encouraged   reassurance provided   thoughts/feelings acknowledged     "

## 2024-05-09 NOTE — PLAN OF CARE
Goal: Absence of Hospital-Acquired Illness or Injury  Outcome: Progressing  Intervention: Prevent Skin Injury  Recent Flowsheet Documentation  Taken 5/9/2024 0400 by Maria Antonia Kramer RN  Body Position: position changed independently  Taken 5/9/2024 0000 by Maria Antonia Kramer RN  Body Position: position changed independently  Taken 5/8/2024 2300 by Maria Antonia Kramer RN  Body Position: position changed independently  Taken 5/8/2024 2000 by Maria Antonia Kramer RN  Body Position: position changed independently  Goal: Optimal Comfort and Wellbeing  Outcome: Progressing  Intervention: Monitor Pain and Promote Comfort  Recent Flowsheet Documentation  Taken 5/9/2024 0401 by Maria Antonia Kramer RN  Pain Management Interventions: medication (see MAR)  Intervention: Provide Person-Centered Care  Recent Flowsheet Documentation  Taken 5/9/2024 0400 by Maria Antonia Kramer RN  Trust Relationship/Rapport:   care explained   choices provided   emotional support provided   empathic listening provided   questions answered   questions encouraged   thoughts/feelings acknowledged   reassurance provided  Taken 5/9/2024 0000 by Maria Antonia Kramer RN  Trust Relationship/Rapport:   care explained   choices provided   emotional support provided   empathic listening provided   questions answered   questions encouraged   thoughts/feelings acknowledged   reassurance provided  Taken 5/8/2024 2000 by Maria Antonia Kramer RN  Trust Relationship/Rapport:   care explained   choices provided   emotional support provided   empathic listening provided   questions answered   questions encouraged   thoughts/feelings acknowledged   reassurance provided  Goal: Readiness for Transition of Care  Outcome: Progressing     Problem: Postpartum (Vaginal Delivery)  Goal: Successful Parent Role Transition  Outcome: Progressing  Goal: Hemostasis  Outcome: Progressing  Goal: Absence of Infection Signs and Symptoms  Outcome: Progressing  Goal: Optimal Pain Control and  Function  Outcome: Progressing  Intervention: Prevent or Manage Pain  Recent Flowsheet Documentation  Taken 5/9/2024 0401 by Maria Antonia Kramer, RN  Pain Management Interventions: medication (see MAR)  Goal: Effective Urinary Elimination  Outcome: Progressing   Goal Outcome Evaluation:  Baby breast and formula feeding on demand every 2-3 hours. Pain controlled with ibuprofen and tylenol. Up independently, voiding without difficulty. Postpartum assessment WNL, see flowsheets for more information. Sister at bedside, supportive. Caregiver education and support on-going.    Maria Antonia Kramer, RN

## 2024-05-10 VITALS
SYSTOLIC BLOOD PRESSURE: 110 MMHG | HEIGHT: 58 IN | WEIGHT: 142.5 LBS | BODY MASS INDEX: 29.91 KG/M2 | RESPIRATION RATE: 16 BRPM | OXYGEN SATURATION: 97 % | HEART RATE: 73 BPM | DIASTOLIC BLOOD PRESSURE: 67 MMHG | TEMPERATURE: 97.6 F

## 2024-05-10 PROCEDURE — 250N000013 HC RX MED GY IP 250 OP 250 PS 637

## 2024-05-10 PROCEDURE — 99207 PR NO CHARGE LOS: CPT | Performed by: STUDENT IN AN ORGANIZED HEALTH CARE EDUCATION/TRAINING PROGRAM

## 2024-05-10 RX ORDER — POLYETHYLENE GLYCOL 3350 17 G/17G
17 POWDER, FOR SOLUTION ORAL DAILY PRN
Qty: 510 G | Refills: 0 | Status: SHIPPED | OUTPATIENT
Start: 2024-05-10 | End: 2024-06-24

## 2024-05-10 RX ORDER — IBUPROFEN 800 MG/1
800 TABLET, FILM COATED ORAL EVERY 6 HOURS PRN
Qty: 60 TABLET | Refills: 0 | Status: SHIPPED | OUTPATIENT
Start: 2024-05-10

## 2024-05-10 RX ORDER — ACETAMINOPHEN 500 MG
500-1000 TABLET ORAL EVERY 8 HOURS PRN
Qty: 60 TABLET | Refills: 0 | Status: SHIPPED | OUTPATIENT
Start: 2024-05-10

## 2024-05-10 RX ADMIN — DOCUSATE SODIUM 100 MG: 100 CAPSULE, LIQUID FILLED ORAL at 08:55

## 2024-05-10 RX ADMIN — BENZOCAINE AND LEVOMENTHOL: 200; 5 SPRAY TOPICAL at 10:26

## 2024-05-10 RX ADMIN — IBUPROFEN 800 MG: 800 TABLET ORAL at 09:01

## 2024-05-10 ASSESSMENT — ACTIVITIES OF DAILY LIVING (ADL)
ADLS_ACUITY_SCORE: 20

## 2024-05-10 NOTE — PLAN OF CARE
Goal Outcome Evaluation:      Problem: Adult Inpatient Plan of Care  Goal: Readiness for Transition of Care  Outcome: Progressing     Problem: Postpartum (Vaginal Delivery)  Goal: Successful Parent Role Transition  Outcome: Progressing  Goal: Hemostasis  Outcome: Progressing      VSS. Baby breast and formula feeding on demand every 2-3 hours. Up independently, voiding without difficulty. Postpartum assessment WNL, see flowsheets for more information. Significant other at bedside, supportive. Caregiver education and support on-going.    Rhea Montemayor RN

## 2024-05-10 NOTE — PLAN OF CARE
Goal Outcome Evaluation:       Patient given and explained AVS using  phone.  Questions answered and patient verbalized understanding. Discharged home with infant. Walked to front entryway with staff.

## 2024-05-10 NOTE — DISCHARGE SUMMARY
Post-partum Discharge Summary    Dipti Kirkpatrick MRN# [id number]   Age: 23 year old YOB: 2000     Date of Admission:  2024  Date of Discharge::  5/10/2024  Admitting Physician:  Bekah Gillis MD  Discharge Physician:  Bekah Gillis MD     Home clinic: [facility]   Please schedule follow up with Dr Ashby.             Admission Diagnoses:   PREGNANCY  Encounter for triage in pregnant patient  Pregnancy          Discharge Diagnosis:     Normal spontaneous vaginal delivery  Intrauterine pregnancy at 39 weeks gestation  Patient Active Problem List   Diagnosis    Language barrier, cultural differences    Mass of right axilla    Short interval between pregnancies affecting pregnancy, antepartum    Supervision of high risk pregnancy, antepartum    GBS (group B Streptococcus carrier), +RV culture, currently pregnant    Encounter for triage in pregnant patient    Pregnancy             Procedures:     Procedure(s): No additional procedures performed       No procedures performed during this admission           Medications Prior to Admission:   The patient was not taking any medications prior to admission          Discharge Medications:     Current Discharge Medication List        START taking these medications    Details   ibuprofen (ADVIL/MOTRIN) 800 MG tablet Take 1 tablet (800 mg) by mouth every 6 hours as needed for other (cramping)  Qty: 60 tablet, Refills: 0    Associated Diagnoses:  (normal spontaneous vaginal delivery)      !! polyethylene glycol (MIRALAX) 17 GM/Dose powder Take 17 g by mouth daily as needed for constipation  Qty: 510 g, Refills: 0    Associated Diagnoses:  (normal spontaneous vaginal delivery)       !! - Potential duplicate medications found. Please discuss with provider.        CONTINUE these medications which have CHANGED    Details   acetaminophen (TYLENOL) 500 MG tablet Take 1-2 tablets (500-1,000 mg)  by mouth every 8 hours as needed for mild pain  Qty: 60 tablet, Refills: 0    Associated Diagnoses:  (normal spontaneous vaginal delivery)           CONTINUE these medications which have NOT CHANGED    Details   !! polyethylene glycol (MIRALAX) 17 GM/Dose powder Take 17 g (1 Capful) by mouth daily  Qty: 578 g, Refills: 0    Associated Diagnoses: Slow transit constipation      Prenatal Vit-Fe Fumarate-FA (PRENATAL MULTIVITAMIN W/IRON) 27-0.8 MG tablet Take 1 tablet by mouth daily  Qty: 90 tablet, Refills: 3    Comments: May substitute for any prenatal vitamin that is covered by insurance  Associated Diagnoses: Pregnancy test positive       !! - Potential duplicate medications found. Please discuss with provider.                Consultations:   No consultations were requested during this admission          Brief History of Labor:   On 2024  at 1:53 PM , this 23 year old year old  under None analgesia delivered a viable female  infant weighing 7 lbs [id number] oz  with APGAR scores below:  APGARs 1 Min 5Min 10Min   Totals: 9   9                 Hospital Course (HPI):   The patient's hospital course was unremarkable.  On discharge, her pain was well controlled. Vaginal bleeding is decreased.  Voiding without difficulty.  Ambulating well and tolerating a normal diet.  No fever.     Infant is stable. Feeding Method: Formula Both breast and formula    She was discharged on post-partum day #2.   Contraception plan: TBD with Dr Ashby, declining contraception at this time.   Post partum depression education was provided.         D/C Physical Exam:     Vitals:    24 0814 24 1243 24 2330 05/10/24 0851   BP: 101/69 96/58 101/60 110/67   BP Location: Right arm Left arm     Patient Position: Chair      Cuff Size: Adult Regular   Adult Regular   Pulse: 71 71  73   Resp: 16 16 16 16   Temp: 97.9  F (36.6  C) 97.3  F (36.3  C) 97.4  F (36.3  C) 97.6  F (36.4  C)   TempSrc: Oral Oral Oral     SpO2:   97% 97%   Weight:       Height:           GENERAL:         healthy, alert, and no distress  RESPIRATORY:   No increased work of breathing, good air exchange, clear to auscultation bilaterally, no crackles or wheezing   CARDIOVASCULAR:   Normal regular rate and rhythm, normal S1 and S2, no S3 or S4, and no murmur noted   ABDOMEN:   No scars, normal bowel sounds, soft, non-distended, non-tender, no masses palpated, no hepatosplenomegally  Fundal height at the umbilicus.  Firm and non tender.   EXTREMITIES:          no edema, non-tender calves    Post-partum hemoglobin:   Hemoglobin   Date Value Ref Range Status   2024 13.6 11.7 - 15.7 g/dL Final           Discharge Instructions and Follow-Up:     Discharge diet: Regular   Discharge activity: Activity as tolerated   Discharge follow-up: Follow up with primary care provider in 14 days   Wound care: Drink plenty of fluids  Ice to area for comfort          Discharge Disposition:     Discharged to home        COVID-19 precautions discussed    Contraception plan: TBD.    Family phone number verified in EPIC and is correct Yes       Patient discussed with attending physician, Dr. Bekah Gillis , who agrees with the plan.    Bekah Samaniego MD PGY-3  5/10/2024  [facility]    Name:  Dipti Kirkpatrick  :  2000  MRN:  [id number]

## 2024-05-23 ENCOUNTER — OFFICE VISIT (OUTPATIENT)
Dept: FAMILY MEDICINE | Facility: CLINIC | Age: 24
End: 2024-05-23
Payer: COMMERCIAL

## 2024-05-23 VITALS
HEIGHT: 58 IN | RESPIRATION RATE: 20 BRPM | TEMPERATURE: 98.5 F | WEIGHT: 124.4 LBS | HEART RATE: 71 BPM | BODY MASS INDEX: 26.11 KG/M2 | SYSTOLIC BLOOD PRESSURE: 109 MMHG | DIASTOLIC BLOOD PRESSURE: 71 MMHG | OXYGEN SATURATION: 99 %

## 2024-05-23 DIAGNOSIS — R21 RASH: ICD-10-CM

## 2024-05-23 PROCEDURE — 99207 PR POST PARTUM EXAM: CPT | Mod: GC

## 2024-05-23 RX ORDER — BENZOCAINE/MENTHOL 6 MG-10 MG
LOZENGE MUCOUS MEMBRANE 2 TIMES DAILY
Qty: 120 G | Refills: 0 | Status: SHIPPED | OUTPATIENT
Start: 2024-05-23 | End: 2024-06-24

## 2024-05-23 NOTE — PATIENT INSTRUCTIONS
Calcium 500-600 mg   Check if this is in the prenatal vitamin. If so take the prenatal. If not, you can buy this separately.

## 2024-06-12 PROBLEM — Z34.90 PREGNANCY: Status: RESOLVED | Noted: 2024-05-08 | Resolved: 2024-06-12

## 2024-06-12 PROBLEM — O09.899 SHORT INTERVAL BETWEEN PREGNANCIES AFFECTING PREGNANCY, ANTEPARTUM: Status: RESOLVED | Noted: 2023-11-13 | Resolved: 2024-06-12

## 2024-06-12 PROBLEM — Z36.89 ENCOUNTER FOR TRIAGE IN PREGNANT PATIENT: Status: RESOLVED | Noted: 2024-05-08 | Resolved: 2024-06-12

## 2024-06-12 PROBLEM — O09.90 SUPERVISION OF HIGH RISK PREGNANCY, ANTEPARTUM: Status: RESOLVED | Noted: 2023-11-13 | Resolved: 2024-06-12

## 2024-06-12 PROBLEM — O99.820 GBS (GROUP B STREPTOCOCCUS CARRIER), +RV CULTURE, CURRENTLY PREGNANT: Status: RESOLVED | Noted: 2024-04-25 | Resolved: 2024-06-12

## 2024-06-21 ENCOUNTER — OFFICE VISIT (OUTPATIENT)
Dept: FAMILY MEDICINE | Facility: CLINIC | Age: 24
End: 2024-06-21
Payer: COMMERCIAL

## 2024-06-21 ENCOUNTER — MEDICAL CORRESPONDENCE (OUTPATIENT)
Dept: HEALTH INFORMATION MANAGEMENT | Facility: CLINIC | Age: 24
End: 2024-06-21

## 2024-06-21 VITALS
HEART RATE: 72 BPM | DIASTOLIC BLOOD PRESSURE: 69 MMHG | WEIGHT: 120.2 LBS | OXYGEN SATURATION: 98 % | HEIGHT: 58 IN | BODY MASS INDEX: 25.23 KG/M2 | SYSTOLIC BLOOD PRESSURE: 109 MMHG | TEMPERATURE: 98.1 F

## 2024-06-21 PROCEDURE — 99207 PR POST PARTUM EXAM: CPT | Mod: GC

## 2024-06-21 NOTE — PROGRESS NOTES
HPI-Post Partum Visit -       Dipti Kirkpatrick is a 23 year old  female  without a significant past medical history who presents for a postpartum visit.     A [employer]  was used for this visit.  was used for  this visit.     Patient Active Problem List   Diagnosis    Language barrier, cultural differences    Mass of right axilla       Current Outpatient Medications   Medication Sig Dispense Refill    acetaminophen (TYLENOL) 500 MG tablet Take 1-2 tablets (500-1,000 mg) by mouth every 8 hours as needed for mild pain (Patient not taking: Reported on 2024) 60 tablet 0    hydrocortisone (CORTAID) 1 % external cream Apply topically 2 times daily (Patient not taking: Reported on 2024) 120 g 0    ibuprofen (ADVIL/MOTRIN) 800 MG tablet Take 1 tablet (800 mg) by mouth every 6 hours as needed for other (cramping) (Patient not taking: Reported on 2024) 60 tablet 0    polyethylene glycol (MIRALAX) 17 GM/Dose powder Take 17 g by mouth daily as needed for constipation (Patient not taking: Reported on 2024) 510 g 0    polyethylene glycol (MIRALAX) 17 GM/Dose powder Take 17 g (1 Capful) by mouth daily (Patient not taking: Reported on 2024) 578 g 0    Prenatal Vit-Fe Fumarate-FA (PRENATAL MULTIVITAMIN W/IRON) 27-0.8 MG tablet Take 1 tablet by mouth daily (Patient not taking: Reported on 2024) 90 tablet 3                      Delivery date: 2024      Patient had a  of viable girl, weight 7 lbs 10 ozs,           with no complications.        Accompanying Signs & Symptoms:                        Breast feeding: formula: Enfamil and some breastfeeding   Abdominal pain: No                       Bleeding since delivery: No        Contraception choice: none        Patient has not had intercourse since delivery.        Last PAP: NILM on 2023  Pt screened for postpartum depression and complaints are: NEGATIVE         Crowder= 0.    Receiving dental care NO   Calcium intake  "is adequate       Allergies   Allergen Reactions    Other Food Allergy Itching and Rash     Patient had urticaria with particular brand of prepackaged ramen noodles.            Review of Systems:     CONSTITUTIONAL: no fatigue, no unexpected change in weight  SKIN: no worrisome rashes or lesions  EYES: no acute vision problems or changes  ENT: no ear problems, no mouth problems, no throat problems  RESP: no significant cough, no shortness of breath  CV: no chest pain, no palpitations, no new or worsening peripheral edema  GI: no nausea, no vomiting, no constipation, no diarrhea  : no frequency, no dysuria, no hematuria  NEURO: no weakness, no dizziness, no headaches  ENDOCRINE: no temperature intolerance, no skin/hair changes  PSYCHIATRIC: NEGATIVE for changes in mood or trouble with sleep            Physical Exam:     Vitals:    06/21/24 1534   BP: 109/69   Pulse: 72   Temp: 98.1  F (36.7  C)   TempSrc: Tympanic   SpO2: 98%   Weight: 54.5 kg (120 lb 3.2 oz)   Height: 1.473 m (4' 9.99\")       GENERAL: healthy, alert, well nourished, well hydrated, no distress  HENT: ear canals- normal; TMs- normal; Nose- normal; Mouth- no ulcers, no lesions  NECK: no tenderness, no adenopathy, no asymmetry, no masses, no stiffness; thyroid- normal to palpation  RESP: lungs clear to auscultation - no rales, no rhonchi, no wheezes  CV: regular rates and rhythm, normal S1 S2, no S3 or S4 and no murmur, no click or rub -  ABDOMEN: soft, no tenderness, no  hepatosplenomegaly, no masses, normal bowel sounds      Admission on 05/08/2024, Discharged on 05/10/2024   Component Date Value Ref Range Status    Hemoglobin 05/08/2024 13.6  11.7 - 15.7 g/dL Final    ABO/RH(D) 05/08/2024 B POS   Final    Antibody Screen 05/08/2024 Negative  Negative Final    SPECIMEN EXPIRATION DATE 05/08/2024 [id number]   Final         Assessment and Plan   Routine postpartum follow-up.    -Tdap for pertussis prophylaxis: administered 03/2024  -Pap not " indicated  -Contraception: none     Discussed birth control options and risks of pregnancy soon after delivery. Patient would like no contraception, understands risks.     Options for treatment and follow-up care were reviewed with the patient and/or guardian. Stevieandrea Kirkpatrick and/or guardian engaged in the decision making process and verbalized understanding of the options discussed and agreed with the final plan.    Ara Ashby, DO

## 2024-09-09 ENCOUNTER — LAB (OUTPATIENT)
Dept: LAB | Facility: CLINIC | Age: 24
End: 2024-09-09
Payer: COMMERCIAL

## 2024-09-09 DIAGNOSIS — N89.8 VAGINAL DISCHARGE: Primary | ICD-10-CM

## 2024-09-09 DIAGNOSIS — R21 RASH: ICD-10-CM

## 2024-09-09 PROCEDURE — 87210 SMEAR WET MOUNT SALINE/INK: CPT

## 2024-09-09 RX ORDER — BENZOCAINE/MENTHOL 6 MG-10 MG
LOZENGE MUCOUS MEMBRANE 2 TIMES DAILY
Qty: 28 G | Refills: 1 | Status: SHIPPED | OUTPATIENT
Start: 2024-09-09

## 2024-09-09 NOTE — RESULT ENCOUNTER NOTE
Please call patient to let her know the wet prep was negative and she should be seen for her own visit so we can do further work up for her complaint. Please schedule her for a visit sometime this week, I will be out of the clinic so okay to schedule her with any open provider. Thanks! - DANIEL

## 2024-11-04 ENCOUNTER — MEDICAL CORRESPONDENCE (OUTPATIENT)
Dept: HEALTH INFORMATION MANAGEMENT | Facility: CLINIC | Age: 24
End: 2024-11-04
Payer: COMMERCIAL

## 2025-05-19 ENCOUNTER — RESULTS FOLLOW-UP (OUTPATIENT)
Dept: FAMILY MEDICINE | Facility: CLINIC | Age: 25
End: 2025-05-19

## 2025-05-19 ENCOUNTER — OFFICE VISIT (OUTPATIENT)
Dept: FAMILY MEDICINE | Facility: CLINIC | Age: 25
End: 2025-05-19
Payer: COMMERCIAL

## 2025-05-19 VITALS
DIASTOLIC BLOOD PRESSURE: 69 MMHG | WEIGHT: 111.8 LBS | BODY MASS INDEX: 24.12 KG/M2 | HEIGHT: 57 IN | OXYGEN SATURATION: 99 % | RESPIRATION RATE: 16 BRPM | SYSTOLIC BLOOD PRESSURE: 105 MMHG | HEART RATE: 80 BPM | TEMPERATURE: 98.2 F

## 2025-05-19 DIAGNOSIS — N92.6 MISSED PERIOD: Primary | ICD-10-CM

## 2025-05-19 DIAGNOSIS — O21.9 NAUSEA AND VOMITING IN PREGNANCY: ICD-10-CM

## 2025-05-19 DIAGNOSIS — Z32.01 PREGNANCY TEST POSITIVE: Primary | ICD-10-CM

## 2025-05-19 DIAGNOSIS — N92.6 MISSED PERIOD: ICD-10-CM

## 2025-05-19 LAB — HCG UR QL: POSITIVE

## 2025-05-19 PROCEDURE — 81025 URINE PREGNANCY TEST: CPT

## 2025-05-19 PROCEDURE — 3078F DIAST BP <80 MM HG: CPT

## 2025-05-19 PROCEDURE — 3074F SYST BP LT 130 MM HG: CPT

## 2025-05-19 PROCEDURE — 99213 OFFICE O/P EST LOW 20 MIN: CPT | Mod: GC

## 2025-05-19 RX ORDER — PYRIDOXINE HCL (VITAMIN B6) 25 MG
TABLET ORAL
Qty: 60 TABLET | Refills: 2 | Status: SHIPPED | OUTPATIENT
Start: 2025-05-19

## 2025-05-19 RX ORDER — PRENATAL VIT/IRON FUM/FOLIC AC 27MG-0.8MG
1 TABLET ORAL DAILY
Qty: 90 TABLET | Refills: 3 | Status: SHIPPED | OUTPATIENT
Start: 2025-05-19

## 2025-05-19 NOTE — PROGRESS NOTES
"  Assessment & Plan     Missed period  Pregnancy test positive  Nausea and vomiting in pregnancy  Positive UPT today, now . Approximate LMP 3/25/2025. Minimal nausea/vomiting. PNV and symptomatic med management send to pharmacy. Reviewed safe medications in pregnancy and 1st trimester anticipatory guidance. OB RN will contact to schedule dating ultrasound and new OB visit.   - HCG qualitative urine  - Prenatal Vit-Fe Fumarate-FA (PRENATAL MULTIVITAMIN W/IRON) 27-0.8 MG tablet  Dispense: 90 tablet; Refill: 3  - doxylamine (UNISOM) 25 MG TABS tablet  Dispense: 30 tablet; Refill: 1  - pyridOXINE (VITAMIN  B-6) 25 MG tablet  Dispense: 60 tablet; Refill: 2              OB RN will reach out to schedule dating ultrasound and new OB visit.       Shirley Resendez is a 24 year old, presenting for the following health issues:  Pregnancy Test (Per pt had positive Upt at home 2 weeks ago)        2025     4:32 PM   Additional Questions   Roomed by mao   Accompanied by self,  and kids         2025    Information    services provided? Yes   Language ong   Type of interpretation provided Face-to-face    name Simone Matta    ID [id number]    Agency [employer]     AFSHIN Kirkpatrick is a  presenting today for pregnancy confirmation   This is a planned and desired pregnancy  FDP 3/25/2025, this is a guess she is about 50% sure   Nausea but no vomiting for the past 2 weeks  Has not yet started PNV   Not taking any daily medication   No vaginal bleeding or vaginal/abdominal pain         Objective    /69   Pulse 80   Temp 98.2  F (36.8  C) (Oral)   Resp 16   Ht 1.448 m (4' 9\")   Wt 50.7 kg (111 lb 12.8 oz)   LMP 2025   SpO2 99%   Breastfeeding Unknown   BMI 24.19 kg/m    Body mass index is 24.19 kg/m .  Physical Exam  Vitals reviewed.   Constitutional:       General: She is not in acute distress.     Appearance: Normal appearance.   HENT:      " Head: Normocephalic.   Cardiovascular:      Rate and Rhythm: Normal rate and regular rhythm.      Heart sounds: No murmur heard.  Pulmonary:      Effort: Pulmonary effort is normal. No respiratory distress.      Breath sounds: No wheezing.   Skin:     General: Skin is warm and dry.   Neurological:      Mental Status: She is alert.   Psychiatric:         Mood and Affect: Mood normal.         Behavior: Behavior normal.         Thought Content: Thought content normal.         Judgment: Judgment normal.          Results for orders placed or performed in visit on 05/19/25 (from the past 24 hours)   HCG qualitative urine   Result Value Ref Range    hCG Urine Qualitative Positive (A) Negative           Signed Electronically by: Ara Ashby DO

## 2025-05-21 ENCOUNTER — APPOINTMENT (OUTPATIENT)
Dept: INTERPRETER SERVICES | Facility: CLINIC | Age: 25
End: 2025-05-21
Payer: COMMERCIAL

## 2025-06-12 ENCOUNTER — ANCILLARY PROCEDURE (OUTPATIENT)
Dept: ULTRASOUND IMAGING | Facility: HOSPITAL | Age: 25
End: 2025-06-12
Attending: STUDENT IN AN ORGANIZED HEALTH CARE EDUCATION/TRAINING PROGRAM
Payer: COMMERCIAL

## 2025-06-12 ENCOUNTER — OFFICE VISIT (OUTPATIENT)
Dept: FAMILY MEDICINE | Facility: CLINIC | Age: 25
End: 2025-06-12
Payer: COMMERCIAL

## 2025-06-12 DIAGNOSIS — K21.9 GASTROESOPHAGEAL REFLUX IN PREGNANCY: ICD-10-CM

## 2025-06-12 DIAGNOSIS — Z34.81 NORMAL PREGNANCY IN MULTIGRAVIDA IN FIRST TRIMESTER: ICD-10-CM

## 2025-06-12 DIAGNOSIS — O99.619 GASTROESOPHAGEAL REFLUX IN PREGNANCY: Primary | ICD-10-CM

## 2025-06-12 DIAGNOSIS — O99.619 GASTROESOPHAGEAL REFLUX IN PREGNANCY: ICD-10-CM

## 2025-06-12 DIAGNOSIS — K21.9 GASTROESOPHAGEAL REFLUX IN PREGNANCY: Primary | ICD-10-CM

## 2025-06-12 RX ORDER — FAMOTIDINE 20 MG/1
20 TABLET, FILM COATED ORAL 2 TIMES DAILY
Qty: 180 TABLET | Refills: 2 | Status: SHIPPED | OUTPATIENT
Start: 2025-06-12

## 2025-06-12 NOTE — PROGRESS NOTES
Pregnancy Ultrasound for Dating and Initial Medical Assessment    24 year old,, at 11w2d by approximate LMP.  Was having fairly regular periods, menstrual cycles usually start between 22 and 25    Pregnancy was planned.  Pregnancy is welcomed.    No vaginal bleeding  No pain  Minimal nausea/vomiting  Good PO tolerance    OB History    Para Term  AB Living   3 2 2 0 0 2   SAB IAB Ectopic Multiple Live Births   0 0 0 0 2      # Outcome Date GA Lbr Julio C/2nd Weight Sex Type Anes PTL Lv   3 Current            2 Term 23 39w1d  3.459 kg (7 lb 10 oz) F Vag-Spont None N EVARISTO      Birth Comments: Precipitous vaginal delivery, + GBS not adequately treated, and 2nd degree perineal lac repaired.      Name: Alis Flores      Apgar1: 9  Apgar5: 9   1 Term 22 40w2d 13:55 / 00:27 3.12 kg (6 lb 14.1 oz) F Vag-Spont None N EVARISTO      Name: Riya Flores      Apgar1: 8  Apgar5: 9       Patient Active Problem List   Diagnosis    Language barrier, cultural differences    Mass of right axilla   Healthy    Past Surgical History:   Procedure Laterality Date    NO HISTORY OF SURGERY         Allergies   Allergen Reactions    Other Food Allergy Itching and Rash     Patient had urticaria with particular brand of prepackaged ramen noodles.       Current Outpatient Medications   Medication Sig Dispense Refill    famotidine (PEPCID) 20 MG tablet Take 1 tablet (20 mg) by mouth 2 times daily. 180 tablet 2    hydrocortisone (CORTAID) 1 % external cream Apply topically 2 times daily. Apply to hands 28 g 1    Prenatal Vit-Fe Fumarate-FA (PRENATAL MULTIVITAMIN W/IRON) 27-0.8 MG tablet Take 1 tablet by mouth daily. 90 tablet 3     No current facility-administered medications for this visit.       Social history:  Living situation: Lives with her , two kids. No extended family.    Work: Stay at home mom  Substances:  Tobacco, betel nut: None  Alcohol: None  Nonprescribed substances, including marijuana:  None    LMP 03/25/2025 (Within Days)       Ultrasound Findings: See Imaging section for images  Transabdominal images obtained    + Fundal intrauterine gestational sac with decidual reaction  + Yolk sac   + Fetal pole  + Cardiac motion, 176 bpm  + Gross movement    CRL =2.88 cm = 9w5d  CRL =2.90 cm = 9w5d  CRL =2.95 cm = 9w6d    Average CRL = 2.91cm = 9w5d      Impression: Single living IUP at 9w5d by today's early dating ultrasound.    C/W LMP? No  Redate? Yes  Today's assigned GA: 9w5d  Final ROQUE: 1/10/2026    Genetic Screen Plans: Did not discuss    Aspirin prophylaxis starting at 12-16 wk: No  The patient does not have a history of:   Any ONE of the following high risk factors:  Pregestational DM1 or DM2  Chronic HTN  Autoimmune dz (Eg SLE, APLS)   H/o Preeclampsia in prior pregnancy  Multifetal gestation  6.   Renal Disease     TWO or more of the following moderate risk factors:  Nulliparity or > 10 years since last birth  Obesity (BMI > 30)  H/o preeclampsia in mother or sister  Age >= 35 years  Experienced anti-black racism or social/structural factors that could impact health  Personal history factors (prior SGA/low birthweight, prior fetal demise)  so WILL NOT start low dose aspirin (81mg) at 12-28 weeks (ideally 12-16 weeks) to prevent preeclampsia.    Special Recommendations for Prenatal Care:  Low risk prenatal care  History of precipitous delivery - consider elective IOL at 39w              Diagnoses and all orders for this visit:    Gastroesophageal reflux in pregnancy  -     famotidine (PEPCID) 20 MG tablet; Take 1 tablet (20 mg) by mouth 2 times daily.  -     POC US OB TRANSABDOMINAL LIMITED; Future    Normal pregnancy in multigravida in first trimester          Follow Up:  No future appointments.      Niharika Griffin MD

## 2025-06-12 NOTE — NURSING NOTE
Due to patient being non-English speaking/uses sign language, an  was used for this visit. Only for face-to-face interpretation by an external agency, date and length of interpretation can be found on the scanned worksheet.      name: Travis Pardo  Agency: [employer]  Language: Nahed   Telephone number: [phone]  Type of interpretation: face to face, spoken          Average Risk Category  No significant risk factors: Yes    At Risk Category (up to 3)  Teen pregnancy: No  Poor social situation: No  Domestic abuse: No  Financial difficulties: No  Smoker: No  H/O  deliver: No  H/O drug abuse: No  Non-English speaking: Yes  Advanced maternal age: No  GDM risks: No  Previous C/S: No  H/O PIH: No  H/O STIs: No  H/O mental health concerns: No  Onset care > 20 weeks: No  Other: short interval between pregnancies    High Risk Category (4 or more At Risk or)  Diabetes/GDM: No  Multiple gestation: No  Chronic hypertension: No  Significant hx of asthma: No  Fetal demise > 20 weeks: No  Positive tox screen: No  Current mental health treatment: No      Risk: At Risk   Date determined: 24    Discussed and gave out the following handouts: 1st trimester fetal development, first trimester pregnancy symptoms, when to see medical advice (ie vomiting frequently, vaginal bleeding or cramping), nutrition during pregnancy, nonmedicinal prevention/treatment of nausea & vomiting, heartburn, and constipation.  Gave proof of pregnancy and phone number for clinic and WIC./Corazon Singh RN BSN

## 2025-06-26 ENCOUNTER — MEDICAL CORRESPONDENCE (OUTPATIENT)
Dept: HEALTH INFORMATION MANAGEMENT | Facility: CLINIC | Age: 25
End: 2025-06-26

## 2025-06-27 PROBLEM — O09.899 SHORT INTERVAL BETWEEN PREGNANCIES AFFECTING PREGNANCY, ANTEPARTUM: Status: ACTIVE | Noted: 2025-06-26

## 2025-06-27 PROBLEM — Z60.3 LANGUAGE BARRIER, CULTURAL DIFFERENCES: Status: ACTIVE | Noted: 2025-06-26

## 2025-06-27 PROBLEM — O09.90 SUPERVISION OF HIGH RISK PREGNANCY, ANTEPARTUM: Status: ACTIVE | Noted: 2025-06-26

## 2025-06-27 PROBLEM — Z87.59 HISTORY OF PRECIPITOUS DELIVERY: Status: ACTIVE | Noted: 2025-06-26

## 2025-06-30 PROBLEM — B95.1 GBS (GROUP B STREPTOCOCCUS) UTI COMPLICATING PREGNANCY, SECOND TRIMESTER: Status: ACTIVE | Noted: 2025-06-26

## 2025-06-30 PROBLEM — O23.40 GBS (GROUP B STREPTOCOCCUS) UTI COMPLICATING PREGNANCY, UNSPECIFIED TRIMESTER: Status: ACTIVE | Noted: 2025-06-26

## 2025-06-30 PROBLEM — O23.42 GBS (GROUP B STREPTOCOCCUS) UTI COMPLICATING PREGNANCY, SECOND TRIMESTER: Status: ACTIVE | Noted: 2025-06-26

## 2025-07-01 ENCOUNTER — APPOINTMENT (OUTPATIENT)
Dept: INTERPRETER SERVICES | Facility: CLINIC | Age: 25
End: 2025-07-01
Payer: COMMERCIAL

## 2025-07-28 ENCOUNTER — OFFICE VISIT (OUTPATIENT)
Dept: FAMILY MEDICINE | Facility: CLINIC | Age: 25
End: 2025-07-28
Payer: COMMERCIAL

## 2025-07-28 VITALS
RESPIRATION RATE: 20 BRPM | OXYGEN SATURATION: 98 % | DIASTOLIC BLOOD PRESSURE: 64 MMHG | SYSTOLIC BLOOD PRESSURE: 95 MMHG | HEART RATE: 83 BPM | BODY MASS INDEX: 24.07 KG/M2 | WEIGHT: 119.4 LBS | HEIGHT: 59 IN | TEMPERATURE: 98.4 F

## 2025-07-28 DIAGNOSIS — O09.90 SUPERVISION OF HIGH RISK PREGNANCY, ANTEPARTUM: Primary | ICD-10-CM

## 2025-07-28 DIAGNOSIS — Z34.81 NORMAL PREGNANCY IN MULTIGRAVIDA IN FIRST TRIMESTER: ICD-10-CM

## 2025-07-28 DIAGNOSIS — L29.9 ITCHING: ICD-10-CM

## 2025-07-28 PROCEDURE — 36415 COLL VENOUS BLD VENIPUNCTURE: CPT | Performed by: DIETITIAN, REGISTERED

## 2025-08-04 LAB — SCANNED LAB RESULT: NORMAL

## 2025-08-18 ENCOUNTER — ANCILLARY PROCEDURE (OUTPATIENT)
Dept: ULTRASOUND IMAGING | Facility: CLINIC | Age: 25
End: 2025-08-18
Attending: STUDENT IN AN ORGANIZED HEALTH CARE EDUCATION/TRAINING PROGRAM
Payer: COMMERCIAL

## 2025-08-18 DIAGNOSIS — O09.90 SUPERVISION OF HIGH RISK PREGNANCY, ANTEPARTUM: ICD-10-CM

## 2025-08-18 PROCEDURE — 76805 OB US >/= 14 WKS SNGL FETUS: CPT | Mod: TC | Performed by: RADIOLOGY
